# Patient Record
Sex: FEMALE | Race: BLACK OR AFRICAN AMERICAN | NOT HISPANIC OR LATINO | Employment: OTHER | URBAN - METROPOLITAN AREA
[De-identification: names, ages, dates, MRNs, and addresses within clinical notes are randomized per-mention and may not be internally consistent; named-entity substitution may affect disease eponyms.]

---

## 2017-12-12 ENCOUNTER — OFFICE VISIT (OUTPATIENT)
Dept: CARDIOTHORACIC SURGERY | Facility: CLINIC | Age: 47
End: 2017-12-12
Payer: COMMERCIAL

## 2017-12-12 VITALS
OXYGEN SATURATION: 97 % | TEMPERATURE: 99 F | BODY MASS INDEX: 29.98 KG/M2 | SYSTOLIC BLOOD PRESSURE: 142 MMHG | WEIGHT: 191 LBS | HEART RATE: 67 BPM | DIASTOLIC BLOOD PRESSURE: 104 MMHG | HEIGHT: 67 IN

## 2017-12-12 DIAGNOSIS — I34.0 MITRAL VALVE INSUFFICIENCY, UNSPECIFIED ETIOLOGY: Primary | ICD-10-CM

## 2017-12-12 DIAGNOSIS — I34.0 NON-RHEUMATIC MITRAL REGURGITATION: ICD-10-CM

## 2017-12-12 PROCEDURE — 99999 PR PBB SHADOW E&M-EST. PATIENT-LVL III: CPT | Mod: PBBFAC,,, | Performed by: THORACIC SURGERY (CARDIOTHORACIC VASCULAR SURGERY)

## 2017-12-12 PROCEDURE — 99205 OFFICE O/P NEW HI 60 MIN: CPT | Mod: S$GLB,,, | Performed by: THORACIC SURGERY (CARDIOTHORACIC VASCULAR SURGERY)

## 2017-12-12 RX ORDER — MONTELUKAST SODIUM 10 MG/1
10 TABLET ORAL NIGHTLY
COMMUNITY
Start: 2017-10-05 | End: 2024-01-17

## 2017-12-12 RX ORDER — TOPIRAMATE 50 MG/1
TABLET, FILM COATED ORAL
Status: ON HOLD | COMMUNITY
Start: 2017-11-30 | End: 2018-01-24 | Stop reason: HOSPADM

## 2017-12-12 NOTE — PROGRESS NOTES
Subjective:      Patient ID: Alondra Carrera is a 47 y.o. female.    Chief Complaint: Consult      HPI:  Alondra Carrera is a 47 y.o. female who present as a referral from Dr. Garner as a consult for surgical intervention for her severe MR with MV prolapse. Her PMHx includes severe MR with moderate mitral valve prolapse, pulmonary hypertension, white mater lesions, and anxiety. She is a current smoker. She endorses fatigue, SOB with ADLs, chest pain, palpitations, and orthopnea. She has known she had a murmur since childhood, but in the last 6 months she has noticed an increase in her symptoms.     Review of patient's allergies indicates:   Allergen Reactions    Sulfa (sulfonamide antibiotics)      Past Medical History:   Diagnosis Date    Genital herpes, unspecified     Hypertension     Mental disorder     Migraines      Past Surgical History:   Procedure Laterality Date    ECTOPIC PREGNANCY SURGERY      EXPLORATORY LAPAROTOMY W/ BOWEL RESECTION      TUBAL LIGATION       Family History     None        Social History     Social History    Marital status:      Spouse name: N/A    Number of children: N/A    Years of education: N/A     Occupational History    Not on file.     Social History Main Topics    Smoking status: Current Every Day Smoker    Smokeless tobacco: Not on file    Alcohol use Yes    Drug use: No    Sexual activity: Yes     Partners: Male     Other Topics Concern    Not on file     Social History Narrative    No narrative on file       Current medications Reviewed    Review of Systems   Constitutional: Positive for activity change and fatigue. Negative for appetite change and fever.   HENT: Negative for congestion, dental problem, sneezing and sore throat.    Eyes: Negative for pain, discharge and visual disturbance.   Respiratory: Positive for shortness of breath. Negative for cough and wheezing.    Cardiovascular: Positive for chest pain and palpitations. Negative for  leg swelling.   Gastrointestinal: Negative for abdominal distention, abdominal pain, constipation, diarrhea, nausea and vomiting.   Endocrine: Negative for polydipsia, polyphagia and polyuria.   Genitourinary: Negative for dysuria, frequency and urgency.   Musculoskeletal: Negative for back pain and gait problem.   Skin: Negative for rash and wound.   Neurological: Positive for dizziness, light-headedness and headaches. Negative for seizures and syncope.   Hematological: Does not bruise/bleed easily.   Psychiatric/Behavioral: Negative for agitation and confusion. The patient is nervous/anxious.      Objective:   Physical Exam   Constitutional: She is oriented to person, place, and time. She appears well-developed and well-nourished.   HENT:   Head: Normocephalic and atraumatic.   Eyes: Pupils are equal, round, and reactive to light.   Neck: Normal range of motion. Neck supple.   Cardiovascular: Normal rate and regular rhythm.    Murmur heard.  Pulmonary/Chest: Effort normal and breath sounds normal.   Abdominal: Soft. Bowel sounds are normal.   Musculoskeletal: Normal range of motion.   Neurological: She is alert and oriented to person, place, and time.   Skin: Skin is warm and dry.   Psychiatric: She has a normal mood and affect. Her behavior is normal.     Diagnostic Results:   2D ECHO- outside records  -EF 65%  -atrial septal aneurysm   -Moderate MVprolapse   -Mod-sev MR  -mild TR  -PAS 41  -mind-mod [place]  Assessment:   Alondra Carrera is a 47 y.o. female who present as a referral from Dr. Garner as a consult for surgical intervention for her severe MR with MV prolapse.  Plan:       CTS Attending Note:    I have personally taken the history and examined this patient and agree with the NP's note as stated above. 48 yo F with severe symptomatic MR. I recommended MVr.  We discussed the risks and benefits of the proposed procedure, including but not limited to death, stroke, respiratory complications, renal  complications, arrythmia, need for permanent pacemaker, and infection. Her questions have been answered, and she wishes to proceed. In the event that the valve is irreparable, the patient is considering valve choice after a discussion of the advantages and disadvantages of mechanical and tissue prostheses. Given smoking history, she needs LHC prior to surgery.

## 2017-12-12 NOTE — LETTER
December 12, 2017      Willis Garner MD  [address] [place] [address] [address]  [address]  [facility]  [place] [address]           [facility]  [address]  [facility] [address]  Phone: [phone]          Patient: Alondra Carrera   MR Number: [id number]   YOB: 1970   Date of Visit: 12/12/2017       Dear Dr. Willis Garner:    Thank you for referring Alondra Carrera to me for evaluation. Attached you will find relevant portions of my assessment and plan of care.    If you have questions, please do not hesitate to call me. I look forward to following Alondra Carrera along with you.    Sincerely,    Randall Sorensen MD    Enclosure  CC:  No Recipients    If you would like to receive this communication electronically, please contact [email or web address] or [phone] to request more information on [employer] access.    For providers and/or their staff who would like to refer a patient to [facility], please contact us through our one-stop-shop provider referral line, [facility], at [phone].    If you feel you have received this communication in error or would no longer like to receive these types of communications, please e-mail [email or web address]

## 2017-12-27 DIAGNOSIS — I34.0 MITRAL VALVE INSUFFICIENCY, UNSPECIFIED ETIOLOGY: Primary | ICD-10-CM

## 2018-01-16 ENCOUNTER — HOSPITAL ENCOUNTER (OUTPATIENT)
Dept: CARDIOLOGY | Facility: CLINIC | Age: 48
Discharge: HOME | DRG: 220 | End: 2018-01-16
Attending: THORACIC SURGERY (CARDIOTHORACIC VASCULAR SURGERY)
Payer: COMMERCIAL

## 2018-01-16 ENCOUNTER — HOSPITAL ENCOUNTER (OUTPATIENT)
Dept: PULMONOLOGY | Facility: CLINIC | Age: 48
Discharge: HOME | DRG: 220 | End: 2018-01-16
Payer: COMMERCIAL

## 2018-01-16 ENCOUNTER — OFFICE VISIT (OUTPATIENT)
Dept: CARDIOTHORACIC SURGERY | Facility: CLINIC | Age: 48
DRG: 220 | End: 2018-01-16
Attending: THORACIC SURGERY (CARDIOTHORACIC VASCULAR SURGERY)
Payer: COMMERCIAL

## 2018-01-16 ENCOUNTER — HOSPITAL ENCOUNTER (OUTPATIENT)
Dept: VASCULAR SURGERY | Facility: CLINIC | Age: 48
Discharge: HOME | DRG: 220 | End: 2018-01-16
Attending: THORACIC SURGERY (CARDIOTHORACIC VASCULAR SURGERY)
Payer: COMMERCIAL

## 2018-01-16 ENCOUNTER — HOSPITAL ENCOUNTER (OUTPATIENT)
Dept: RADIOLOGY | Facility: HOSPITAL | Age: 48
Discharge: HOME | DRG: 220 | End: 2018-01-16
Attending: THORACIC SURGERY (CARDIOTHORACIC VASCULAR SURGERY)
Payer: COMMERCIAL

## 2018-01-16 ENCOUNTER — ANESTHESIA EVENT (OUTPATIENT)
Dept: SURGERY | Facility: HOSPITAL | Age: 48
DRG: 220 | End: 2018-01-16
Payer: COMMERCIAL

## 2018-01-16 VITALS
WEIGHT: 190.81 LBS | BODY MASS INDEX: 29.95 KG/M2 | SYSTOLIC BLOOD PRESSURE: 141 MMHG | HEIGHT: 67 IN | DIASTOLIC BLOOD PRESSURE: 85 MMHG | HEART RATE: 74 BPM | TEMPERATURE: 99 F

## 2018-01-16 DIAGNOSIS — I34.0 MITRAL VALVE INSUFFICIENCY, UNSPECIFIED ETIOLOGY: ICD-10-CM

## 2018-01-16 DIAGNOSIS — Z01.818 PREOP EXAMINATION: ICD-10-CM

## 2018-01-16 DIAGNOSIS — I34.0 NON-RHEUMATIC MITRAL REGURGITATION: Primary | ICD-10-CM

## 2018-01-16 LAB
ESTIMATED PA SYSTOLIC PRESSURE: 31.73
MITRAL VALVE MOBILITY: ABNORMAL
MITRAL VALVE REGURGITATION: ABNORMAL
PRE FEV1 FVC: 82
PRE FEV1: 2.27
PRE FVC: 2.78
PREDICTED FEV1 FVC: 81
PREDICTED FEV1: 2.91
PREDICTED FVC: 3.57
RETIRED EF AND QEF - SEE NOTES: 65 (ref 55–65)
TRICUSPID VALVE REGURGITATION: ABNORMAL

## 2018-01-16 PROCEDURE — 93880 EXTRACRANIAL BILAT STUDY: CPT | Mod: S$GLB,,, | Performed by: SURGERY

## 2018-01-16 PROCEDURE — 94010 BREATHING CAPACITY TEST: CPT | Mod: S$GLB,,, | Performed by: INTERNAL MEDICINE

## 2018-01-16 PROCEDURE — 71046 X-RAY EXAM CHEST 2 VIEWS: CPT | Mod: TC,FY

## 2018-01-16 PROCEDURE — 93306 TTE W/DOPPLER COMPLETE: CPT | Mod: S$GLB,,, | Performed by: INTERNAL MEDICINE

## 2018-01-16 PROCEDURE — 99999 PR PBB SHADOW E&M-EST. PATIENT-LVL III: CPT | Mod: PBBFAC,,, | Performed by: THORACIC SURGERY (CARDIOTHORACIC VASCULAR SURGERY)

## 2018-01-16 PROCEDURE — 36600 WITHDRAWAL OF ARTERIAL BLOOD: CPT | Mod: 53,S$GLB,, | Performed by: INTERNAL MEDICINE

## 2018-01-16 PROCEDURE — 71046 X-RAY EXAM CHEST 2 VIEWS: CPT | Mod: 26,,, | Performed by: RADIOLOGY

## 2018-01-16 PROCEDURE — 99499 UNLISTED E&M SERVICE: CPT | Mod: S$GLB,,, | Performed by: NURSE PRACTITIONER

## 2018-01-16 PROCEDURE — 93000 ELECTROCARDIOGRAM COMPLETE: CPT | Mod: S$GLB,,, | Performed by: INTERNAL MEDICINE

## 2018-01-16 NOTE — PROGRESS NOTES
"PREPARING FOR SURGERY  Your surgery has been scheduled for:   Day: Thursday Date: 1/18/2018  Arrival Time: 5A  Start Time: 7A  You should report to the second floor surgery center, located on the [facility] side of the second floor of the [facility]. The phone number is [phone].    PLEASE NOTE  · If you are allergic to any medications, please inform your doctor or the nurse responsible for your care.  · Tell the doctor if you take aspirin, products containing aspirin, herbal medications or blood thinners, such as Coumadin, Pradaxa, or Plavix.  · Notify your doctor if you are diabetic and provide information about the medications you take.  · Arrange for someone to drive you home following surgery. You will not be allowed to leave the surgical facility alone or drive yourself home following sedation and anesthesia.  · If you have not already done so, please bring a list of your medications with you the day of your surgery.    BEFORE SURGERY  Stop taking all herbal medications 14 days prior to surgery  Stop taking aspirin, products containing aspirin 0 days before surgery  Stop taking blood thinners 0 days before surgery  Refrain from drinking alcohol beverages for 24 hours before and after surgery  Stop or limit smoking 0 days before surgery  Other: ________________________________________________________    THE NIGHT BEFORE SURGERY  Eat a light supper on the night before your surgery, no greasy or fatty foods.  DO NOT EAT OR DRINK ANYTHING AFTER MIDNIGHT, INCLUDING GUM, HARD CANDY, MINTS, OR CHEWING TOBACCO  Take a complete shower. Wash your body from the neck down with Hibiclens (chlorhexidine gluconate) soap. Hibiclens soap may be purchased over the counter at the pharmacy. Keep the soap away from your eyes, ears, and mouth. After washing with Hibiclens, rinse thoroughly. You may also use any soap labeled "antibacterial". Shampoo your hair with your regular shampoo.    THE DAY OF SURGERY  Take " another shower with Hibiclens or any antibacterial soap, to reduce the change of infection.  Medications to take the morning of surgery: Metoprolol with a small sip of water. Do not take diuretics or fluid pills.  Diabetic medication instructions will be given by the preop center. They will call you before your surgery.  You may brush your teeth and rinse your mouth, but do not shallow any water.  Do not apply perfume, powder, body lotions or deodorant on the day of surgery.  Do not wear any makeup, including mascara and false eyelashes.  Nail polish should be removed.  Wear comfortable clothes, such as button front shirt and loose-fitting pants.  Leave all jewelry, including body piercings and valuables at home.  Hairpins and clasps must be removed before you enter the operating room.  You may wear glasses, dentures and hearing aids before and after surgery. They may need to be removed before going into the operating room. Contact lenses worn before surgery must be removed before entering the operating room. Please bring a case for your hearing aids, glasses and/or contacts.  Bring any devices you will need after surgery such as crutches or canes.  If you have sleep apnea, please bring your CPAP machine.  If you have an implantable device, such as a pacemaker or AICD, please bring the device information card, if you have one.    If you have any questions or concerns, please don't hesitate to call.    RUTH PizarroN, RN  RN Clinician  Thoracic and Cardiovascular Surgery  [address] [place] [id number]  [phone] fax

## 2018-01-16 NOTE — ANESTHESIA PREPROCEDURE EVALUATION
[facility]  Anesthesia Pre-Operative Evaluation         Patient Name: Alondra Carrera  YOB: 1970  MRN: [id number]    SUBJECTIVE:     Pre-operative evaluation for Procedure(s) (LRB):  Mitral Valve Repair (N/A)     01/16/2018    Alondra Carrera is a 47 y.o. female w/ a significant PMHx of current smoker, HTN and severe MR with MV prolapse and PA SP of 32.     Patient now presents for the above procedure(s).       Prev airway: None documented.    Patient Active Problem List   Diagnosis    Mitral valve insufficiency    Preop examination       Review of patient's allergies indicates:   Allergen Reactions    Sulfa (sulfonamide antibiotics)        Current Inpatient Medications:      Current Outpatient Prescriptions on File Prior to Encounter   Medication Sig Dispense Refill    butalbital-acetaminophen-caffeine -40 mg (FIORICET, ESGIC) -40 mg per tablet       clonazepam (KLONOPIN) 0.5 MG tablet       duloxetine (CYMBALTA) 60 MG capsule Take 60 mg by mouth once daily.  0    losartan (COZAAR) 50 MG tablet Take 50 mg by mouth once daily.  3    metoprolol succinate (TOPROL-XL) 50 MG 24 hr tablet Take 50 mg by mouth once daily.  3    montelukast (SINGULAIR) 10 mg tablet       MONTELUKAST SODIUM (SINGULAIR ORAL) Take by mouth.      simvastatin (ZOCOR) 20 MG tablet Take 20 mg by mouth nightly.  0    SPIRIVA WITH HANDIHALER 18 mcg inhalation capsule INHALE CONTENTS OF 1 CAPSULE ONCE DAILY.  3    topiramate (TOPAMAX) 25 MG tablet       topiramate (TOPAMAX) 50 MG tablet       valacyclovir (VALTREX) 500 MG tablet Take 1 tablet (500 mg total) by mouth 2 (two) times daily. 10 tablet 11     No current facility-administered medications on file prior to encounter.        Past Surgical History:   Procedure Laterality Date    ECTOPIC PREGNANCY SURGERY      EXPLORATORY LAPAROTOMY W/ BOWEL RESECTION      TUBAL LIGATION         Social History     Social History    Marital status:       Spouse name: N/A    Number of children: N/A    Years of education: N/A     Occupational History    Not on file.     Social History Main Topics    Smoking status: Current Every Day Smoker    Smokeless tobacco: Not on file    Alcohol use Yes    Drug use: No    Sexual activity: Yes     Partners: Male     Other Topics Concern    Not on file     Social History Narrative    No narrative on file       OBJECTIVE:     Vital Signs Range (Last 24H):  Temp:  [37.3 °C (99.1 °F)]   Pulse:  [74]   BP: (141)/(85)       CBC:   Recent Labs      18   1120   WBC  4.36   RBC  4.97   HGB  12.9   HCT  41.1   PLT  183   MCV  83   MCH  26.0*   MCHC  31.4*       CMP:   Recent Labs      18   1120   NA  142   K  3.4*   CL  110   CO2  25   BUN  8   CREATININE  0.9   GLU  83   CALCIUM  9.4   ALBUMIN  3.5   PROT  6.9   ALKPHOS  80   ALT  9*   AST  18   BILITOT  0.7       INR:  Recent Labs      18   1120   INR  1.0   APTT  26.2       Diagnostic Studies: CXR 2018  Cardiomegaly, no acute lung pathologies    EK2018  Normal sinus rhythm  Possible Left atrial enlargement  Borderline Abnormal ECG  When compared with ECG of 09-AUG-2015 09:48,    2D ECHO:  Results for orders placed or performed during the hospital encounter of 18   2D echo with color flow doppler   Result Value Ref Range    EF 65 55 - 65    Mitral Valve Regurgitation MODERATE TO SEVERE (A)     Est. PA Systolic Pressure 31.73     Mitral Valve Mobility POSTERIOR LEAFLET PROLAPSE     Tricuspid Valve Regurgitation MILD      CONCLUSIONS     1 - Normal left ventricular systolic function (EF 60-65%).     2 - Mild left atrial enlargement.     3 - No wall motion abnormalities.     4 - Normal right ventricular systolic function .     5 - The estimated PA systolic pressure is 32 mmHg.     6 - The mitral valve is thickened with evidence of posterior leaflet prolapse.     7 - Moderate to severe mitral regurgitation ( see text).     8 - Mild  tricuspid regurgitation.     9 - Low central venous pressure.     ASSESSMENT/PLAN:         Anesthesia Evaluation    I have reviewed the Patient Summary Reports.     I have reviewed the Medications.     Review of Systems  Anesthesia Hx:  No problems with previous Anesthesia Denies Hx of Anesthetic complications  History of prior surgery of interest to airway management or planning:   Social:  Smoker    Cardiovascular:   Exercise tolerance: poor Hypertension Valvular problems/Murmurs, MR, MVP ECG has been reviewed.    Pulmonary:   Shortness of breath    Neurological:   Headaches    Endocrine:  Endocrine Normal    Psych:   anxiety          Physical Exam  General:  Well nourished    Airway/Jaw/Neck:  Airway Findings: Mouth Opening: Small, but > 3cm Tongue: Normal  General Airway Assessment: Adult  Mallampati: III  Improves to II with phonation.  TM Distance: Normal, at least 6 cm        Eyes/Ears/Nose:  EYES/EARS/NOSE FINDINGS: Normal   Dental:  Dental Findings: In tact   Chest/Lungs:  Chest/Lungs Findings: Normal Respiratory Rate     Heart/Vascular:  Heart Findings: Rate: Normal  Rhythm: Regular Rhythm  Sounds: Normal  Heart Murmur        Mental Status:  Mental Status Findings:  Cooperative, Alert and Oriented         Anesthesia Plan  Type of Anesthesia, risks & benefits discussed:  Anesthesia Type:  general  Patient's Preference:   Intra-op Monitoring Plan: standard ASA monitors, arterial line, central line and Hollister-Yves  Intra-op Monitoring Plan Comments:   Post Op Pain Control Plan: multimodal analgesia, per primary service following discharge from PACU and IV/PO Opioids PRN  Post Op Pain Control Plan Comments:   Induction:   IV  Beta Blocker:  Patient is not currently on a Beta-Blocker (No further documentation required).       Informed Consent: Patient understands risks and agrees with Anesthesia plan.  Questions answered. Anesthesia consent signed with patient.  ASA Score: 3     Day of Surgery Review of History &  Physical:    H&P update referred to the surgeon.     Anesthesia Plan Notes: Patient did not show to pre-op clinic        Ready For Surgery From Anesthesia Perspective.

## 2018-01-16 NOTE — PROGRESS NOTES
[]Onelia for attribution information  Subjective:      Patient ID: Alondra Carrera is a 47 y.o. female.     Chief Complaint: Consult        HPI:  Alondra Carrera is a 47 y.o. female who present as a referral from Dr. Garner as a consult for surgical intervention for her severe MR with MV prolapse. Her PMHx includes severe MR with moderate mitral valve prolapse, pulmonary hypertension, white mater lesions, and anxiety. She is a current smoker. She endorses fatigue, SOB with ADLs, chest pain, palpitations, and orthopnea. She has known she had a murmur since childhood, but in the last 6 months she has noticed an increase in her symptoms.           Review of patient's allergies indicates:   Allergen Reactions    Sulfa (sulfonamide antibiotics)             Past Medical History:   Diagnosis Date    Genital herpes, unspecified      Hypertension      Mental disorder      Migraines              Past Surgical History:   Procedure Laterality Date    ECTOPIC PREGNANCY SURGERY        EXPLORATORY LAPAROTOMY W/ BOWEL RESECTION        TUBAL LIGATION              Family History      None          Social History   Social History            Social History    Marital status:        Spouse name: N/A    Number of children: N/A    Years of education: N/A          Occupational History    Not on file.            Social History Main Topics    Smoking status: Current Every Day Smoker    Smokeless tobacco: Not on file    Alcohol use Yes    Drug use: No    Sexual activity: Yes       Partners: Male           Other Topics Concern    Not on file          Social History Narrative    No narrative on file            Current medications Reviewed     Review of Systems   Constitutional: Positive for activity change and fatigue. Negative for appetite change and fever.   HENT: Negative for congestion, dental problem, sneezing and sore throat.    Eyes: Negative for pain, discharge and visual disturbance.   Respiratory: Positive  for shortness of breath. Negative for cough and wheezing.    Cardiovascular: Positive for chest pain and palpitations. Negative for leg swelling.   Gastrointestinal: Negative for abdominal distention, abdominal pain, constipation, diarrhea, nausea and vomiting.   Endocrine: Negative for polydipsia, polyphagia and polyuria.   Genitourinary: Negative for dysuria, frequency and urgency.   Musculoskeletal: Negative for back pain and gait problem.   Skin: Negative for rash and wound.   Neurological: Positive for dizziness, light-headedness and headaches. Negative for seizures and syncope.   Hematological: Does not bruise/bleed easily.   Psychiatric/Behavioral: Negative for agitation and confusion. The patient is nervous/anxious.       Objective:   Physical Exam   Constitutional: She is oriented to person, place, and time. She appears well-developed and well-nourished.   HENT:   Head: Normocephalic and atraumatic.   Eyes: Pupils are equal, round, and reactive to light.   Neck: Normal range of motion. Neck supple.   Cardiovascular: Normal rate and regular rhythm.    Murmur heard.  Pulmonary/Chest: Effort normal and breath sounds normal.   Abdominal: Soft. Bowel sounds are normal.   Musculoskeletal: Normal range of motion.   Neurological: She is alert and oriented to person, place, and time.   Skin: Skin is warm and dry.   Psychiatric: She has a normal mood and affect. Her behavior is normal.      Diagnostic Results:   2D ECHO- outside records  -EF 65%  -atrial septal aneurysm   -Moderate MVprolapse   -Mod-sev MR  -mild TR  -PAS 41  -mind-mod [place]  Assessment:   Alondra Carrera is a 47 y.o. female who present as a referral from Dr. Garner as a consult for surgical intervention for her severe MR with MV prolapse.  Plan:    Plans for MVr this week. Pt will bring in disk from [facility].     CTS Attending Note:    I have personally taken the history and examined this patient and agree with the NP's note as stated above.  Plan MVr. Questions answered. She wishes to proceed.

## 2018-01-18 ENCOUNTER — HOSPITAL ENCOUNTER (INPATIENT)
Facility: HOSPITAL | Age: 48
LOS: 6 days | Discharge: HOME HEALTH | DRG: 220 | End: 2018-01-24
Attending: THORACIC SURGERY (CARDIOTHORACIC VASCULAR SURGERY) | Admitting: THORACIC SURGERY (CARDIOTHORACIC VASCULAR SURGERY)
Payer: COMMERCIAL

## 2018-01-18 ENCOUNTER — SURGERY (OUTPATIENT)
Age: 48
End: 2018-01-18

## 2018-01-18 ENCOUNTER — ANESTHESIA (OUTPATIENT)
Dept: SURGERY | Facility: HOSPITAL | Age: 48
DRG: 220 | End: 2018-01-18
Payer: COMMERCIAL

## 2018-01-18 DIAGNOSIS — I34.0 NON-RHEUMATIC MITRAL REGURGITATION: Primary | ICD-10-CM

## 2018-01-18 DIAGNOSIS — I34.0 MITRAL VALVE INSUFFICIENCY, UNSPECIFIED ETIOLOGY: ICD-10-CM

## 2018-01-18 DIAGNOSIS — Z98.890 HISTORY OF HEART SURGERY: ICD-10-CM

## 2018-01-18 DIAGNOSIS — I34.9 NONRHEUMATIC MITRAL VALVE DISORDER: ICD-10-CM

## 2018-01-18 DIAGNOSIS — I49.9 ARRHYTHMIA: ICD-10-CM

## 2018-01-18 DIAGNOSIS — R00.0 SINUS TACHYCARDIA: ICD-10-CM

## 2018-01-18 DIAGNOSIS — R73.9 STRESS HYPERGLYCEMIA: ICD-10-CM

## 2018-01-18 DIAGNOSIS — Z98.890 S/P MVR (MITRAL VALVE REPAIR): ICD-10-CM

## 2018-01-18 LAB
ALLENS TEST: ABNORMAL
ALLENS TEST: ABNORMAL
ANION GAP SERPL CALC-SCNC: ABNORMAL MMOL/L
APTT BLDCRRT: 29.8 SEC
BASOPHILS # BLD AUTO: 0.06 K/UL
BASOPHILS NFR BLD: 0.4 %
BUN SERPL-MCNC: 7 MG/DL
CALCIUM SERPL-MCNC: 8 MG/DL
CHLORIDE SERPL-SCNC: 117 MMOL/L
CO2 SERPL-SCNC: ABNORMAL MMOL/L
CREAT SERPL-MCNC: 0.8 MG/DL
DELSYS: ABNORMAL
DELSYS: ABNORMAL
DIFFERENTIAL METHOD: ABNORMAL
EOSINOPHIL # BLD AUTO: 0 K/UL
EOSINOPHIL NFR BLD: 0.1 %
ERYTHROCYTE [DISTWIDTH] IN BLOOD BY AUTOMATED COUNT: 13.6 %
EST. GFR  (AFRICAN AMERICAN): >60 ML/MIN/1.73 M^2
EST. GFR  (NON AFRICAN AMERICAN): >60 ML/MIN/1.73 M^2
FIO2: 60
FIO2: 65
FIO2: 65
FIO2: 70
FIO2: 70
GLUCOSE SERPL-MCNC: 105 MG/DL (ref 70–110)
GLUCOSE SERPL-MCNC: 108 MG/DL (ref 70–110)
GLUCOSE SERPL-MCNC: 116 MG/DL (ref 70–110)
GLUCOSE SERPL-MCNC: 126 MG/DL (ref 70–110)
GLUCOSE SERPL-MCNC: 134 MG/DL (ref 70–110)
GLUCOSE SERPL-MCNC: 138 MG/DL (ref 70–110)
GLUCOSE SERPL-MCNC: 145 MG/DL (ref 70–110)
GLUCOSE SERPL-MCNC: 148 MG/DL (ref 70–110)
GLUCOSE SERPL-MCNC: 149 MG/DL (ref 70–110)
GLUCOSE SERPL-MCNC: 151 MG/DL (ref 70–110)
GLUCOSE SERPL-MCNC: 170 MG/DL
HCO3 UR-SCNC: 19.7 MMOL/L (ref 24–28)
HCO3 UR-SCNC: 20.8 MMOL/L (ref 24–28)
HCO3 UR-SCNC: 20.9 MMOL/L (ref 24–28)
HCO3 UR-SCNC: 23.1 MMOL/L (ref 24–28)
HCO3 UR-SCNC: 23.3 MMOL/L (ref 24–28)
HCO3 UR-SCNC: 23.4 MMOL/L (ref 24–28)
HCO3 UR-SCNC: 24.3 MMOL/L (ref 24–28)
HCO3 UR-SCNC: 24.4 MMOL/L (ref 24–28)
HCO3 UR-SCNC: 24.9 MMOL/L (ref 24–28)
HCO3 UR-SCNC: 26.1 MMOL/L (ref 24–28)
HCO3 UR-SCNC: 30.2 MMOL/L (ref 24–28)
HCO3 UR-SCNC: 30.5 MMOL/L (ref 24–28)
HCT VFR BLD AUTO: 32.2 %
HCT VFR BLD CALC: 19 %PCV (ref 36–54)
HCT VFR BLD CALC: 24 %PCV (ref 36–54)
HCT VFR BLD CALC: 25 %PCV (ref 36–54)
HCT VFR BLD CALC: 26 %PCV (ref 36–54)
HCT VFR BLD CALC: 27 %PCV (ref 36–54)
HCT VFR BLD CALC: 28 %PCV (ref 36–54)
HCT VFR BLD CALC: 28 %PCV (ref 36–54)
HCT VFR BLD CALC: 29 %PCV (ref 36–54)
HCT VFR BLD CALC: 29 %PCV (ref 36–54)
HCT VFR BLD CALC: 31 %PCV (ref 36–54)
HCT VFR BLD CALC: 31 %PCV (ref 36–54)
HGB BLD-MCNC: 10.3 G/DL
IMM GRANULOCYTES # BLD AUTO: 0.12 K/UL
IMM GRANULOCYTES NFR BLD AUTO: 0.7 %
INR PPP: 1.2
LYMPHOCYTES # BLD AUTO: 1.4 K/UL
LYMPHOCYTES NFR BLD: 8.1 %
MAGNESIUM SERPL-MCNC: 2.6 MG/DL
MCH RBC QN AUTO: 27.1 PG
MCHC RBC AUTO-ENTMCNC: 32 G/DL
MCV RBC AUTO: 85 FL
MONOCYTES # BLD AUTO: 1 K/UL
MONOCYTES NFR BLD: 5.9 %
NEUTROPHILS # BLD AUTO: 14.4 K/UL
NEUTROPHILS NFR BLD: 84.8 %
NRBC BLD-RTO: 0 /100 WBC
PCO2 BLDA: 33.1 MMHG (ref 35–45)
PCO2 BLDA: 36.7 MMHG (ref 35–45)
PCO2 BLDA: 37.2 MMHG (ref 35–45)
PCO2 BLDA: 40.4 MMHG (ref 35–45)
PCO2 BLDA: 40.8 MMHG (ref 35–45)
PCO2 BLDA: 41 MMHG (ref 35–45)
PCO2 BLDA: 41.2 MMHG (ref 35–45)
PCO2 BLDA: 41.5 MMHG (ref 35–45)
PCO2 BLDA: 41.6 MMHG (ref 35–45)
PCO2 BLDA: 44.1 MMHG (ref 35–45)
PCO2 BLDA: 44.2 MMHG (ref 35–45)
PCO2 BLDA: 48.3 MMHG (ref 35–45)
PH SMN: 7.25 [PH] (ref 7.35–7.45)
PH SMN: 7.26 [PH] (ref 7.35–7.45)
PH SMN: 7.35 [PH] (ref 7.35–7.45)
PH SMN: 7.36 [PH] (ref 7.35–7.45)
PH SMN: 7.38 [PH] (ref 7.35–7.45)
PH SMN: 7.39 [PH] (ref 7.35–7.45)
PH SMN: 7.4 [PH] (ref 7.35–7.45)
PH SMN: 7.41 [PH] (ref 7.35–7.45)
PH SMN: 7.44 [PH] (ref 7.35–7.45)
PH SMN: 7.53 [PH] (ref 7.35–7.45)
PHOSPHATE SERPL-MCNC: 2.6 MG/DL
PLATELET # BLD AUTO: 98 K/UL
PMV BLD AUTO: 10.9 FL
PO2 BLDA: 101 MMHG (ref 80–100)
PO2 BLDA: 254 MMHG (ref 80–100)
PO2 BLDA: 294 MMHG (ref 80–100)
PO2 BLDA: 324 MMHG (ref 80–100)
PO2 BLDA: 359 MMHG (ref 80–100)
PO2 BLDA: 359 MMHG (ref 80–100)
PO2 BLDA: 361 MMHG (ref 80–100)
PO2 BLDA: 371 MMHG (ref 80–100)
PO2 BLDA: 45 MMHG (ref 40–60)
PO2 BLDA: 463 MMHG (ref 80–100)
PO2 BLDA: 490 MMHG (ref 80–100)
PO2 BLDA: 497 MMHG (ref 80–100)
POC BE: -1 MMOL/L
POC BE: -2 MMOL/L
POC BE: -2 MMOL/L
POC BE: -4 MMOL/L
POC BE: -6 MMOL/L
POC BE: -7 MMOL/L
POC BE: 0 MMOL/L
POC BE: 2 MMOL/L
POC BE: 6 MMOL/L
POC BE: 8 MMOL/L
POC IONIZED CALCIUM: 0.91 MMOL/L (ref 1.06–1.42)
POC IONIZED CALCIUM: 1.02 MMOL/L (ref 1.06–1.42)
POC IONIZED CALCIUM: 1.03 MMOL/L (ref 1.06–1.42)
POC IONIZED CALCIUM: 1.09 MMOL/L (ref 1.06–1.42)
POC IONIZED CALCIUM: 1.1 MMOL/L (ref 1.06–1.42)
POC IONIZED CALCIUM: 1.1 MMOL/L (ref 1.06–1.42)
POC IONIZED CALCIUM: 1.11 MMOL/L (ref 1.06–1.42)
POC IONIZED CALCIUM: 1.13 MMOL/L (ref 1.06–1.42)
POC IONIZED CALCIUM: 1.32 MMOL/L (ref 1.06–1.42)
POC SATURATED O2: 100 % (ref 95–100)
POC SATURATED O2: 80 % (ref 95–100)
POC SATURATED O2: 97 % (ref 95–100)
POC TCO2: 21 MMOL/L (ref 23–27)
POC TCO2: 22 MMOL/L (ref 23–27)
POC TCO2: 22 MMOL/L (ref 23–27)
POC TCO2: 24 MMOL/L (ref 23–27)
POC TCO2: 25 MMOL/L (ref 23–27)
POC TCO2: 25 MMOL/L (ref 23–27)
POC TCO2: 26 MMOL/L (ref 23–27)
POC TCO2: 26 MMOL/L (ref 23–27)
POC TCO2: 26 MMOL/L (ref 24–29)
POC TCO2: 27 MMOL/L (ref 23–27)
POC TCO2: 32 MMOL/L (ref 23–27)
POC TCO2: 32 MMOL/L (ref 23–27)
POTASSIUM BLD-SCNC: 2.8 MMOL/L (ref 3.5–5.1)
POTASSIUM BLD-SCNC: 3.5 MMOL/L (ref 3.5–5.1)
POTASSIUM BLD-SCNC: 3.7 MMOL/L (ref 3.5–5.1)
POTASSIUM BLD-SCNC: 3.9 MMOL/L (ref 3.5–5.1)
POTASSIUM BLD-SCNC: 4.2 MMOL/L (ref 3.5–5.1)
POTASSIUM BLD-SCNC: 4.3 MMOL/L (ref 3.5–5.1)
POTASSIUM BLD-SCNC: 4.5 MMOL/L (ref 3.5–5.1)
POTASSIUM BLD-SCNC: 4.6 MMOL/L (ref 3.5–5.1)
POTASSIUM BLD-SCNC: 4.6 MMOL/L (ref 3.5–5.1)
POTASSIUM SERPL-SCNC: 4 MMOL/L
POTASSIUM SERPL-SCNC: 4.3 MMOL/L
PROTHROMBIN TIME: 12.5 SEC
RBC # BLD AUTO: 3.8 M/UL
SAMPLE: ABNORMAL
SITE: ABNORMAL
SITE: ABNORMAL
SODIUM BLD-SCNC: 145 MMOL/L (ref 136–145)
SODIUM BLD-SCNC: 146 MMOL/L (ref 136–145)
SODIUM BLD-SCNC: 147 MMOL/L (ref 136–145)
SODIUM BLD-SCNC: 148 MMOL/L (ref 136–145)
SODIUM SERPL-SCNC: 142 MMOL/L
WBC # BLD AUTO: 16.98 K/UL

## 2018-01-18 PROCEDURE — 93010 ELECTROCARDIOGRAM REPORT: CPT | Mod: ,,, | Performed by: INTERNAL MEDICINE

## 2018-01-18 PROCEDURE — 27000175 HC ADULT BYPASS PUMP

## 2018-01-18 PROCEDURE — 02BG0ZZ EXCISION OF MITRAL VALVE, OPEN APPROACH: ICD-10-PCS | Performed by: THORACIC SURGERY (CARDIOTHORACIC VASCULAR SURGERY)

## 2018-01-18 PROCEDURE — 27201423 OPTIME MED/SURG SUP & DEVICES STERILE SUPPLY: Performed by: THORACIC SURGERY (CARDIOTHORACIC VASCULAR SURGERY)

## 2018-01-18 PROCEDURE — 99900035 HC TECH TIME PER 15 MIN (STAT)

## 2018-01-18 PROCEDURE — 27100088 HC CELL SAVER

## 2018-01-18 PROCEDURE — 20000000 HC ICU ROOM

## 2018-01-18 PROCEDURE — 88305 TISSUE EXAM BY PATHOLOGIST: CPT | Mod: 26,,, | Performed by: PATHOLOGY

## 2018-01-18 PROCEDURE — 76942 ECHO GUIDE FOR BIOPSY: CPT | Mod: 26,,, | Performed by: ANESTHESIOLOGY

## 2018-01-18 PROCEDURE — 33427 REPAIR OF MITRAL VALVE: CPT | Mod: ,,, | Performed by: THORACIC SURGERY (CARDIOTHORACIC VASCULAR SURGERY)

## 2018-01-18 PROCEDURE — 82803 BLOOD GASES ANY COMBINATION: CPT

## 2018-01-18 PROCEDURE — 83735 ASSAY OF MAGNESIUM: CPT

## 2018-01-18 PROCEDURE — 36556 INSERT NON-TUNNEL CV CATH: CPT | Mod: 59,,, | Performed by: ANESTHESIOLOGY

## 2018-01-18 PROCEDURE — 27100025 HC TUBING, SET FLUID WARMER: Performed by: ANESTHESIOLOGY

## 2018-01-18 PROCEDURE — 37000009 HC ANESTHESIA EA ADD 15 MINS: Performed by: THORACIC SURGERY (CARDIOTHORACIC VASCULAR SURGERY)

## 2018-01-18 PROCEDURE — 63600175 PHARM REV CODE 636 W HCPCS: Performed by: STUDENT IN AN ORGANIZED HEALTH CARE EDUCATION/TRAINING PROGRAM

## 2018-01-18 PROCEDURE — 25000003 PHARM REV CODE 250: Performed by: STUDENT IN AN ORGANIZED HEALTH CARE EDUCATION/TRAINING PROGRAM

## 2018-01-18 PROCEDURE — 84132 ASSAY OF SERUM POTASSIUM: CPT

## 2018-01-18 PROCEDURE — 80048 BASIC METABOLIC PNL TOTAL CA: CPT

## 2018-01-18 PROCEDURE — 27201041 HC RESERVOIR, CARDIOTOMY

## 2018-01-18 PROCEDURE — 93312 ECHO TRANSESOPHAGEAL: CPT | Mod: 26,59,, | Performed by: ANESTHESIOLOGY

## 2018-01-18 PROCEDURE — C9113 INJ PANTOPRAZOLE SODIUM, VIA: HCPCS | Performed by: NURSE PRACTITIONER

## 2018-01-18 PROCEDURE — 37000008 HC ANESTHESIA 1ST 15 MINUTES: Performed by: THORACIC SURGERY (CARDIOTHORACIC VASCULAR SURGERY)

## 2018-01-18 PROCEDURE — 27200953 HC CARDIOPLEGIA SYSTEM

## 2018-01-18 PROCEDURE — 84100 ASSAY OF PHOSPHORUS: CPT

## 2018-01-18 PROCEDURE — 37799 UNLISTED PX VASCULAR SURGERY: CPT

## 2018-01-18 PROCEDURE — 63600175 PHARM REV CODE 636 W HCPCS: Performed by: ANESTHESIOLOGY

## 2018-01-18 PROCEDURE — 85520 HEPARIN ASSAY: CPT

## 2018-01-18 PROCEDURE — 82330 ASSAY OF CALCIUM: CPT

## 2018-01-18 PROCEDURE — 36000713 HC OR TIME LEV V EA ADD 15 MIN: Performed by: THORACIC SURGERY (CARDIOTHORACIC VASCULAR SURGERY)

## 2018-01-18 PROCEDURE — 25000003 PHARM REV CODE 250: Performed by: NURSE PRACTITIONER

## 2018-01-18 PROCEDURE — 63600175 PHARM REV CODE 636 W HCPCS: Mod: JG | Performed by: THORACIC SURGERY (CARDIOTHORACIC VASCULAR SURGERY)

## 2018-01-18 PROCEDURE — 63600175 PHARM REV CODE 636 W HCPCS: Performed by: NURSE PRACTITIONER

## 2018-01-18 PROCEDURE — 25000003 PHARM REV CODE 250: Performed by: ANESTHESIOLOGY

## 2018-01-18 PROCEDURE — 27200678 HC TRANSDUCER MONITOR KIT TRIPLE: Performed by: ANESTHESIOLOGY

## 2018-01-18 PROCEDURE — 36620 INSERTION CATHETER ARTERY: CPT | Mod: 59,,, | Performed by: ANESTHESIOLOGY

## 2018-01-18 PROCEDURE — 25000003 PHARM REV CODE 250: Performed by: THORACIC SURGERY (CARDIOTHORACIC VASCULAR SURGERY)

## 2018-01-18 PROCEDURE — 5A1221Z PERFORMANCE OF CARDIAC OUTPUT, CONTINUOUS: ICD-10-PCS | Performed by: THORACIC SURGERY (CARDIOTHORACIC VASCULAR SURGERY)

## 2018-01-18 PROCEDURE — 85610 PROTHROMBIN TIME: CPT

## 2018-01-18 PROCEDURE — A4216 STERILE WATER/SALINE, 10 ML: HCPCS | Performed by: NURSE PRACTITIONER

## 2018-01-18 PROCEDURE — C1729 CATH, DRAINAGE: HCPCS | Performed by: THORACIC SURGERY (CARDIOTHORACIC VASCULAR SURGERY)

## 2018-01-18 PROCEDURE — 36592 COLLECT BLOOD FROM PICC: CPT

## 2018-01-18 PROCEDURE — 27000191 HC C-V MONITORING

## 2018-01-18 PROCEDURE — 85730 THROMBOPLASTIN TIME PARTIAL: CPT

## 2018-01-18 PROCEDURE — 27200651 HC AIRWAY, LMA: Performed by: ANESTHESIOLOGY

## 2018-01-18 PROCEDURE — 64450 NJX AA&/STRD OTHER PN/BRANCH: CPT | Mod: 50,59,, | Performed by: ANESTHESIOLOGY

## 2018-01-18 PROCEDURE — 88305 TISSUE EXAM BY PATHOLOGIST: CPT | Performed by: PATHOLOGY

## 2018-01-18 PROCEDURE — 76937 US GUIDE VASCULAR ACCESS: CPT | Mod: 26,59,, | Performed by: ANESTHESIOLOGY

## 2018-01-18 PROCEDURE — 94640 AIRWAY INHALATION TREATMENT: CPT

## 2018-01-18 PROCEDURE — 27201037 HC PRESSURE MONITORING SET UP

## 2018-01-18 PROCEDURE — P9045 ALBUMIN (HUMAN), 5%, 250 ML: HCPCS | Mod: JG | Performed by: NURSE PRACTITIONER

## 2018-01-18 PROCEDURE — D9220A PRA ANESTHESIA: Mod: ,,, | Performed by: ANESTHESIOLOGY

## 2018-01-18 PROCEDURE — 25000242 PHARM REV CODE 250 ALT 637 W/ HCPCS: Performed by: NURSE PRACTITIONER

## 2018-01-18 PROCEDURE — 02UG0JZ SUPPLEMENT MITRAL VALVE WITH SYNTHETIC SUBSTITUTE, OPEN APPROACH: ICD-10-PCS | Performed by: THORACIC SURGERY (CARDIOTHORACIC VASCULAR SURGERY)

## 2018-01-18 PROCEDURE — P9045 ALBUMIN (HUMAN), 5%, 250 ML: HCPCS | Mod: JG | Performed by: THORACIC SURGERY (CARDIOTHORACIC VASCULAR SURGERY)

## 2018-01-18 PROCEDURE — C1894 INTRO/SHEATH, NON-LASER: HCPCS | Performed by: ANESTHESIOLOGY

## 2018-01-18 PROCEDURE — 36000712 HC OR TIME LEV V 1ST 15 MIN: Performed by: THORACIC SURGERY (CARDIOTHORACIC VASCULAR SURGERY)

## 2018-01-18 PROCEDURE — 84295 ASSAY OF SERUM SODIUM: CPT

## 2018-01-18 PROCEDURE — 85025 COMPLETE CBC W/AUTO DIFF WBC: CPT

## 2018-01-18 PROCEDURE — 27200750 HC INSULATED NEEDLE/ STIMUPLEX: Performed by: ANESTHESIOLOGY

## 2018-01-18 PROCEDURE — 27800595 HC HEART VALVES

## 2018-01-18 PROCEDURE — 85014 HEMATOCRIT: CPT

## 2018-01-18 PROCEDURE — 27800505 HC CATH, RADIAL ARTERY KIT: Performed by: ANESTHESIOLOGY

## 2018-01-18 RX ORDER — SODIUM CHLORIDE 9 MG/ML
INJECTION, SOLUTION INTRAVENOUS CONTINUOUS PRN
Status: DISCONTINUED | OUTPATIENT
Start: 2018-01-18 | End: 2018-01-18

## 2018-01-18 RX ORDER — HYDRALAZINE HYDROCHLORIDE 20 MG/ML
10 INJECTION INTRAMUSCULAR; INTRAVENOUS EVERY 6 HOURS PRN
Status: DISCONTINUED | OUTPATIENT
Start: 2018-01-18 | End: 2018-01-19

## 2018-01-18 RX ORDER — METOPROLOL TARTRATE 25 MG/1
25 TABLET, FILM COATED ORAL
Status: DISCONTINUED | OUTPATIENT
Start: 2018-01-18 | End: 2018-01-18 | Stop reason: HOSPADM

## 2018-01-18 RX ORDER — ACETAMINOPHEN 325 MG/1
650 TABLET ORAL EVERY 4 HOURS PRN
Status: DISCONTINUED | OUTPATIENT
Start: 2018-01-18 | End: 2018-01-24

## 2018-01-18 RX ORDER — ASPIRIN 300 MG/1
300 SUPPOSITORY RECTAL ONCE AS NEEDED
Status: ACTIVE | OUTPATIENT
Start: 2018-01-18 | End: 2018-01-18

## 2018-01-18 RX ORDER — HEPARIN SODIUM 1000 [USP'U]/ML
INJECTION, SOLUTION INTRAVENOUS; SUBCUTANEOUS
Status: DISCONTINUED | OUTPATIENT
Start: 2018-01-18 | End: 2018-01-18

## 2018-01-18 RX ORDER — IPRATROPIUM BROMIDE AND ALBUTEROL SULFATE 2.5; .5 MG/3ML; MG/3ML
3 SOLUTION RESPIRATORY (INHALATION) EVERY 4 HOURS
Status: DISCONTINUED | OUTPATIENT
Start: 2018-01-18 | End: 2018-01-19

## 2018-01-18 RX ORDER — BISACODYL 10 MG
10 SUPPOSITORY, RECTAL RECTAL EVERY 12 HOURS PRN
Status: DISCONTINUED | OUTPATIENT
Start: 2018-01-18 | End: 2018-01-24

## 2018-01-18 RX ORDER — MAGNESIUM SULFATE HEPTAHYDRATE 40 MG/ML
2 INJECTION, SOLUTION INTRAVENOUS
Status: DISCONTINUED | OUTPATIENT
Start: 2018-01-18 | End: 2018-01-19

## 2018-01-18 RX ORDER — SODIUM BICARBONATE 1 MEQ/ML
50 SYRINGE (ML) INTRAVENOUS ONCE
Status: COMPLETED | OUTPATIENT
Start: 2018-01-18 | End: 2018-01-18

## 2018-01-18 RX ORDER — DOCUSATE SODIUM 100 MG/1
100 CAPSULE, LIQUID FILLED ORAL 2 TIMES DAILY
Status: DISCONTINUED | OUTPATIENT
Start: 2018-01-18 | End: 2018-01-24 | Stop reason: HOSPADM

## 2018-01-18 RX ORDER — FENTANYL CITRATE 50 UG/ML
INJECTION, SOLUTION INTRAMUSCULAR; INTRAVENOUS
Status: DISCONTINUED | OUTPATIENT
Start: 2018-01-18 | End: 2018-01-18

## 2018-01-18 RX ORDER — ATORVASTATIN CALCIUM 20 MG/1
40 TABLET, FILM COATED ORAL NIGHTLY
Status: DISCONTINUED | OUTPATIENT
Start: 2018-01-18 | End: 2018-01-20

## 2018-01-18 RX ORDER — MAGNESIUM SULFATE HEPTAHYDRATE 40 MG/ML
4 INJECTION, SOLUTION INTRAVENOUS
Status: DISCONTINUED | OUTPATIENT
Start: 2018-01-18 | End: 2018-01-19

## 2018-01-18 RX ORDER — SODIUM CHLORIDE 9 MG/ML
INJECTION, SOLUTION INTRAVENOUS CONTINUOUS
Status: DISCONTINUED | OUTPATIENT
Start: 2018-01-18 | End: 2018-01-18

## 2018-01-18 RX ORDER — SODIUM CHLORIDE 0.9 % (FLUSH) 0.9 %
3 SYRINGE (ML) INJECTION EVERY 8 HOURS
Status: DISCONTINUED | OUTPATIENT
Start: 2018-01-18 | End: 2018-01-19

## 2018-01-18 RX ORDER — ONDANSETRON 2 MG/ML
4 INJECTION INTRAMUSCULAR; INTRAVENOUS EVERY 12 HOURS PRN
Status: DISCONTINUED | OUTPATIENT
Start: 2018-01-18 | End: 2018-01-24

## 2018-01-18 RX ORDER — POTASSIUM CHLORIDE 750 MG/1
20 CAPSULE, EXTENDED RELEASE ORAL EVERY 12 HOURS
Status: DISCONTINUED | OUTPATIENT
Start: 2018-01-18 | End: 2018-01-18

## 2018-01-18 RX ORDER — OXYCODONE HYDROCHLORIDE 5 MG/1
5 TABLET ORAL EVERY 4 HOURS PRN
Status: DISCONTINUED | OUTPATIENT
Start: 2018-01-18 | End: 2018-01-19

## 2018-01-18 RX ORDER — MUPIROCIN 20 MG/G
1 OINTMENT TOPICAL
Status: COMPLETED | OUTPATIENT
Start: 2018-01-18 | End: 2018-01-18

## 2018-01-18 RX ORDER — POTASSIUM CHLORIDE 14.9 MG/ML
INJECTION INTRAVENOUS CONTINUOUS PRN
Status: DISCONTINUED | OUTPATIENT
Start: 2018-01-18 | End: 2018-01-18

## 2018-01-18 RX ORDER — PROTAMINE SULFATE 10 MG/ML
INJECTION, SOLUTION INTRAVENOUS
Status: DISCONTINUED | OUTPATIENT
Start: 2018-01-18 | End: 2018-01-18

## 2018-01-18 RX ORDER — DEXTROSE MONOHYDRATE, SODIUM CHLORIDE, AND POTASSIUM CHLORIDE 50; 1.49; 4.5 G/1000ML; G/1000ML; G/1000ML
INJECTION, SOLUTION INTRAVENOUS CONTINUOUS
Status: DISCONTINUED | OUTPATIENT
Start: 2018-01-18 | End: 2018-01-19

## 2018-01-18 RX ORDER — LIDOCAINE HYDROCHLORIDE 10 MG/ML
1 INJECTION, SOLUTION EPIDURAL; INFILTRATION; INTRACAUDAL; PERINEURAL
Status: DISCONTINUED | OUTPATIENT
Start: 2018-01-18 | End: 2018-01-18 | Stop reason: HOSPADM

## 2018-01-18 RX ORDER — FENTANYL CITRATE 50 UG/ML
25 INJECTION, SOLUTION INTRAMUSCULAR; INTRAVENOUS
Status: DISCONTINUED | OUTPATIENT
Start: 2018-01-18 | End: 2018-01-20

## 2018-01-18 RX ORDER — CEFAZOLIN SODIUM 1 G/3ML
2 INJECTION, POWDER, FOR SOLUTION INTRAMUSCULAR; INTRAVENOUS
Status: DISCONTINUED | OUTPATIENT
Start: 2018-01-18 | End: 2018-01-19

## 2018-01-18 RX ORDER — FUROSEMIDE 10 MG/ML
20 INJECTION INTRAMUSCULAR; INTRAVENOUS 2 TIMES DAILY
Status: DISCONTINUED | OUTPATIENT
Start: 2018-01-18 | End: 2018-01-18

## 2018-01-18 RX ORDER — MIDAZOLAM HYDROCHLORIDE 1 MG/ML
INJECTION INTRAMUSCULAR; INTRAVENOUS
Status: DISCONTINUED | OUTPATIENT
Start: 2018-01-18 | End: 2018-01-18

## 2018-01-18 RX ORDER — BACITRACIN 50000 [IU]/1
INJECTION, POWDER, FOR SOLUTION INTRAMUSCULAR
Status: DISCONTINUED | OUTPATIENT
Start: 2018-01-18 | End: 2018-01-18 | Stop reason: HOSPADM

## 2018-01-18 RX ORDER — ACETAMINOPHEN 10 MG/ML
INJECTION, SOLUTION INTRAVENOUS
Status: DISCONTINUED | OUTPATIENT
Start: 2018-01-18 | End: 2018-01-18

## 2018-01-18 RX ORDER — ASPIRIN 325 MG
325 TABLET ORAL ONCE
Status: COMPLETED | OUTPATIENT
Start: 2018-01-18 | End: 2018-01-18

## 2018-01-18 RX ORDER — OXYCODONE HYDROCHLORIDE 5 MG/1
5 TABLET ORAL EVERY 4 HOURS PRN
Status: DISCONTINUED | OUTPATIENT
Start: 2018-01-18 | End: 2018-01-24 | Stop reason: HOSPADM

## 2018-01-18 RX ORDER — POLYETHYLENE GLYCOL 3350 17 G/17G
17 POWDER, FOR SOLUTION ORAL DAILY
Status: DISCONTINUED | OUTPATIENT
Start: 2018-01-18 | End: 2018-01-24 | Stop reason: HOSPADM

## 2018-01-18 RX ORDER — PANTOPRAZOLE SODIUM 40 MG/10ML
40 INJECTION, POWDER, LYOPHILIZED, FOR SOLUTION INTRAVENOUS DAILY
Status: DISCONTINUED | OUTPATIENT
Start: 2018-01-18 | End: 2018-01-20

## 2018-01-18 RX ORDER — ASPIRIN 325 MG
325 TABLET ORAL DAILY
Status: DISCONTINUED | OUTPATIENT
Start: 2018-01-18 | End: 2018-01-24 | Stop reason: HOSPADM

## 2018-01-18 RX ORDER — NITROGLYCERIN 5 MG/ML
INJECTION, SOLUTION INTRAVENOUS
Status: DISCONTINUED | OUTPATIENT
Start: 2018-01-18 | End: 2018-01-18

## 2018-01-18 RX ORDER — ASPIRIN 325 MG
325 TABLET, DELAYED RELEASE (ENTERIC COATED) ORAL DAILY
Status: DISCONTINUED | OUTPATIENT
Start: 2018-01-18 | End: 2018-01-18

## 2018-01-18 RX ORDER — PROPOFOL 10 MG/ML
5 INJECTION, EMULSION INTRAVENOUS CONTINUOUS
Status: DISCONTINUED | OUTPATIENT
Start: 2018-01-18 | End: 2018-01-19

## 2018-01-18 RX ORDER — METOCLOPRAMIDE HYDROCHLORIDE 5 MG/ML
5 INJECTION INTRAMUSCULAR; INTRAVENOUS EVERY 6 HOURS PRN
Status: DISCONTINUED | OUTPATIENT
Start: 2018-01-18 | End: 2018-01-24

## 2018-01-18 RX ORDER — ONDANSETRON 2 MG/ML
INJECTION INTRAMUSCULAR; INTRAVENOUS
Status: DISCONTINUED | OUTPATIENT
Start: 2018-01-18 | End: 2018-01-18

## 2018-01-18 RX ORDER — NICARDIPINE HYDROCHLORIDE 0.2 MG/ML
1 INJECTION INTRAVENOUS CONTINUOUS
Status: DISCONTINUED | OUTPATIENT
Start: 2018-01-18 | End: 2018-01-19

## 2018-01-18 RX ORDER — PROPOFOL 10 MG/ML
INJECTION, EMULSION INTRAVENOUS
Status: DISCONTINUED | OUTPATIENT
Start: 2018-01-18 | End: 2018-01-18

## 2018-01-18 RX ORDER — IPRATROPIUM BROMIDE AND ALBUTEROL SULFATE 2.5; .5 MG/3ML; MG/3ML
3 SOLUTION RESPIRATORY (INHALATION) EVERY 4 HOURS PRN
Status: DISCONTINUED | OUTPATIENT
Start: 2018-01-18 | End: 2018-01-24

## 2018-01-18 RX ORDER — PHENYLEPHRINE HYDROCHLORIDE 10 MG/ML
INJECTION INTRAVENOUS
Status: DISCONTINUED | OUTPATIENT
Start: 2018-01-18 | End: 2018-01-18

## 2018-01-18 RX ORDER — MUPIROCIN 20 MG/G
1 OINTMENT TOPICAL 2 TIMES DAILY
Status: DISCONTINUED | OUTPATIENT
Start: 2018-01-18 | End: 2018-01-19

## 2018-01-18 RX ORDER — FENTANYL CITRATE 50 UG/ML
25 INJECTION, SOLUTION INTRAMUSCULAR; INTRAVENOUS
Status: DISCONTINUED | OUTPATIENT
Start: 2018-01-18 | End: 2018-01-18

## 2018-01-18 RX ORDER — TRANEXAMIC ACID 100 MG/ML
INJECTION, SOLUTION INTRAVENOUS
Status: DISCONTINUED | OUTPATIENT
Start: 2018-01-18 | End: 2018-01-18

## 2018-01-18 RX ORDER — PANTOPRAZOLE SODIUM 40 MG/1
40 TABLET, DELAYED RELEASE ORAL
Status: DISCONTINUED | OUTPATIENT
Start: 2018-01-19 | End: 2018-01-18

## 2018-01-18 RX ORDER — ROCURONIUM BROMIDE 10 MG/ML
INJECTION, SOLUTION INTRAVENOUS
Status: DISCONTINUED | OUTPATIENT
Start: 2018-01-18 | End: 2018-01-18

## 2018-01-18 RX ORDER — METOPROLOL TARTRATE 25 MG/1
12.5 TABLET ORAL EVERY 12 HOURS
Status: DISCONTINUED | OUTPATIENT
Start: 2018-01-18 | End: 2018-01-18

## 2018-01-18 RX ORDER — LIDOCAINE HCL/PF 100 MG/5ML
SYRINGE (ML) INTRAVENOUS
Status: DISCONTINUED | OUTPATIENT
Start: 2018-01-18 | End: 2018-01-18

## 2018-01-18 RX ORDER — ALBUMIN HUMAN 50 G/1000ML
25 SOLUTION INTRAVENOUS ONCE
Status: COMPLETED | OUTPATIENT
Start: 2018-01-18 | End: 2018-01-18

## 2018-01-18 RX ORDER — ALBUMIN HUMAN 50 G/1000ML
500 SOLUTION INTRAVENOUS ONCE AS NEEDED
Status: COMPLETED | OUTPATIENT
Start: 2018-01-18 | End: 2018-01-18

## 2018-01-18 RX ORDER — CEFAZOLIN SODIUM 1 G/3ML
2 INJECTION, POWDER, FOR SOLUTION INTRAMUSCULAR; INTRAVENOUS
Status: DISCONTINUED | OUTPATIENT
Start: 2018-01-18 | End: 2018-01-18 | Stop reason: HOSPADM

## 2018-01-18 RX ADMIN — FENTANYL CITRATE 25 MCG: 50 INJECTION, SOLUTION INTRAMUSCULAR; INTRAVENOUS at 08:01

## 2018-01-18 RX ADMIN — FENTANYL CITRATE 150 MCG: 50 INJECTION, SOLUTION INTRAMUSCULAR; INTRAVENOUS at 07:01

## 2018-01-18 RX ADMIN — MUPIROCIN 1 G: 20 OINTMENT TOPICAL at 05:01

## 2018-01-18 RX ADMIN — GELATIN ABSORBABLE SPONGE SIZE 100 1 APPLICATOR: MISC at 08:01

## 2018-01-18 RX ADMIN — BACITRACIN 50000 UNITS: 50000 INJECTION, POWDER, LYOPHILIZED, FOR SOLUTION INTRAMUSCULAR at 02:01

## 2018-01-18 RX ADMIN — MIDAZOLAM HYDROCHLORIDE 2 MG: 1 INJECTION, SOLUTION INTRAMUSCULAR; INTRAVENOUS at 07:01

## 2018-01-18 RX ADMIN — SODIUM CHLORIDE: 0.9 INJECTION, SOLUTION INTRAVENOUS at 06:01

## 2018-01-18 RX ADMIN — PROPOFOL 50 MG: 10 INJECTION, EMULSION INTRAVENOUS at 01:01

## 2018-01-18 RX ADMIN — MUPIROCIN 1 G: 20 OINTMENT TOPICAL at 10:01

## 2018-01-18 RX ADMIN — PROTAMINE SULFATE 30 MG: 10 INJECTION, SOLUTION INTRAVENOUS at 01:01

## 2018-01-18 RX ADMIN — HEPARIN SODIUM 19000 UNITS: 1000 INJECTION, SOLUTION INTRAVENOUS; SUBCUTANEOUS at 08:01

## 2018-01-18 RX ADMIN — TRANEXAMIC ACID 86.2 MG: 100 INJECTION, SOLUTION INTRAVENOUS at 07:01

## 2018-01-18 RX ADMIN — FENTANYL CITRATE 250 MCG: 50 INJECTION, SOLUTION INTRAMUSCULAR; INTRAVENOUS at 07:01

## 2018-01-18 RX ADMIN — CEFAZOLIN 2 G: 330 INJECTION, POWDER, FOR SOLUTION INTRAMUSCULAR; INTRAVENOUS at 11:01

## 2018-01-18 RX ADMIN — NOREPINEPHRINE BITARTRATE 0.01 MG: 1 INJECTION, SOLUTION, CONCENTRATE INTRAVENOUS at 09:01

## 2018-01-18 RX ADMIN — SODIUM CHLORIDE: 0.9 INJECTION, SOLUTION INTRAVENOUS at 07:01

## 2018-01-18 RX ADMIN — CALCIUM CHLORIDE 0.5 G: 100 INJECTION, SOLUTION INTRAVENOUS at 02:01

## 2018-01-18 RX ADMIN — PROPOFOL 100 MG: 10 INJECTION, EMULSION INTRAVENOUS at 07:01

## 2018-01-18 RX ADMIN — SODIUM CHLORIDE: 0.9 INJECTION, SOLUTION INTRAVENOUS at 01:01

## 2018-01-18 RX ADMIN — HYDRALAZINE HYDROCHLORIDE 10 MG: 20 INJECTION INTRAMUSCULAR; INTRAVENOUS at 10:01

## 2018-01-18 RX ADMIN — EPINEPHRINE 0.04 MCG/KG/MIN: 1 INJECTION INTRAMUSCULAR; INTRAVENOUS; SUBCUTANEOUS at 10:01

## 2018-01-18 RX ADMIN — FENTANYL CITRATE 250 MCG: 50 INJECTION, SOLUTION INTRAMUSCULAR; INTRAVENOUS at 08:01

## 2018-01-18 RX ADMIN — FENTANYL CITRATE 25 MCG: 50 INJECTION, SOLUTION INTRAMUSCULAR; INTRAVENOUS at 07:01

## 2018-01-18 RX ADMIN — DEXTROSE 2 G: 50 INJECTION, SOLUTION INTRAVENOUS at 02:01

## 2018-01-18 RX ADMIN — MIDAZOLAM HYDROCHLORIDE 2 MG: 1 INJECTION, SOLUTION INTRAMUSCULAR; INTRAVENOUS at 06:01

## 2018-01-18 RX ADMIN — IPRATROPIUM BROMIDE AND ALBUTEROL SULFATE 3 ML: .5; 3 SOLUTION RESPIRATORY (INHALATION) at 09:01

## 2018-01-18 RX ADMIN — POTASSIUM CHLORIDE: 14.9 INJECTION, SOLUTION INTRAVENOUS at 08:01

## 2018-01-18 RX ADMIN — SODIUM CHLORIDE 1 UNITS/HR: 9 INJECTION, SOLUTION INTRAVENOUS at 04:01

## 2018-01-18 RX ADMIN — PANTOPRAZOLE SODIUM 40 MG: 40 INJECTION, POWDER, FOR SOLUTION INTRAVENOUS at 06:01

## 2018-01-18 RX ADMIN — DEXTROSE 2 G: 50 INJECTION, SOLUTION INTRAVENOUS at 08:01

## 2018-01-18 RX ADMIN — PHENYLEPHRINE HYDROCHLORIDE 100 MCG: 10 INJECTION INTRAVENOUS at 11:01

## 2018-01-18 RX ADMIN — OXYCODONE HYDROCHLORIDE 5 MG: 5 TABLET ORAL at 08:01

## 2018-01-18 RX ADMIN — ALBUMIN (HUMAN) 25 G: 12.5 SOLUTION INTRAVENOUS at 07:01

## 2018-01-18 RX ADMIN — FENTANYL CITRATE 100 MCG: 50 INJECTION, SOLUTION INTRAMUSCULAR; INTRAVENOUS at 07:01

## 2018-01-18 RX ADMIN — NITROGLYCERIN 25 MCG: 5 INJECTION, SOLUTION INTRAVENOUS at 09:01

## 2018-01-18 RX ADMIN — ACETAMINOPHEN 1000 MG: 10 INJECTION, SOLUTION INTRAVENOUS at 01:01

## 2018-01-18 RX ADMIN — ASPIRIN 325 MG ORAL TABLET 325 MG: 325 PILL ORAL at 08:01

## 2018-01-18 RX ADMIN — ALBUMIN (HUMAN) 500 ML: 12.5 SOLUTION INTRAVENOUS at 03:01

## 2018-01-18 RX ADMIN — DEXTROSE MONOHYDRATE, SODIUM CHLORIDE, AND POTASSIUM CHLORIDE: 50; 4.5; 1.49 INJECTION, SOLUTION INTRAVENOUS at 06:01

## 2018-01-18 RX ADMIN — ATORVASTATIN CALCIUM 40 MG: 20 TABLET, FILM COATED ORAL at 08:01

## 2018-01-18 RX ADMIN — PROPOFOL 50 MG: 10 INJECTION, EMULSION INTRAVENOUS at 10:01

## 2018-01-18 RX ADMIN — PROPOFOL 50 MG: 10 INJECTION, EMULSION INTRAVENOUS at 09:01

## 2018-01-18 RX ADMIN — DOCUSATE SODIUM 100 MG: 50 CAPSULE, LIQUID FILLED ORAL at 08:01

## 2018-01-18 RX ADMIN — PROTAMINE SULFATE 180 MG: 10 INJECTION, SOLUTION INTRAVENOUS at 01:01

## 2018-01-18 RX ADMIN — IPRATROPIUM BROMIDE AND ALBUTEROL SULFATE 3 ML: .5; 3 SOLUTION RESPIRATORY (INHALATION) at 05:01

## 2018-01-18 RX ADMIN — FENTANYL CITRATE 25 MCG: 50 INJECTION, SOLUTION INTRAMUSCULAR; INTRAVENOUS at 04:01

## 2018-01-18 RX ADMIN — NOREPINEPHRINE BITARTRATE 0.02 MG: 1 INJECTION, SOLUTION, CONCENTRATE INTRAVENOUS at 09:01

## 2018-01-18 RX ADMIN — Medication 3 ML: at 11:01

## 2018-01-18 RX ADMIN — ROCURONIUM BROMIDE 100 MG: 10 INJECTION, SOLUTION INTRAVENOUS at 07:01

## 2018-01-18 RX ADMIN — SODIUM BICARBONATE 50 MEQ: 84 INJECTION, SOLUTION INTRAVENOUS at 07:01

## 2018-01-18 RX ADMIN — LIDOCAINE HYDROCHLORIDE 100 MG: 20 INJECTION, SOLUTION INTRAVENOUS at 07:01

## 2018-01-18 NOTE — HPI
Alondra Carrera is a 47 y.o. female with who's pmhx includes severe MR with moderate mitral valve prolapse, pulmonary hypertension, white mater lesions, and anxiety. She is a current smoker. She has known she had a murmur since childhood, but in the last 6 months she has noticed an increase in her symptoms.     Intra-op course complicated by severe MR and TRUDY requiring patient to go back on CPB. After correction of valve placement patient was able to successfully about to come off of pump. She arrived to SICU extubated on 15L facemask on 5 of cardene

## 2018-01-18 NOTE — CONSULTS
[facility]  Critical Care -Surgery  Consult Note    Patient Name: Alondra Carrera  MRN: [id number]  Admission Date: 1/18/2018  Hospital Length of Stay: 0 days  Code Status: Full Code  Attending Physician: Randall Sorensen MD  Primary Care Provider: Nita De La Cruz MD   Principal Problem: <principal problem not specified>    Consults  Subjective:     HPI:  Alondra Carrera is a 47 y.o. female with who's pmhx includes severe MR with moderate mitral valve prolapse, pulmonary hypertension, white mater lesions, and anxiety. She is a current smoker. She has known she had a murmur since childhood, but in the last 6 months she has noticed an increase in her symptoms.     Intra-op course complicated by severe MR and TRUDY requiring patient to go back on CPB. After correction of valve placement patient was able to successfully about to come off of pump. She arrived to SICU extubated on 15L NC and 5mg of cardene.    Follow-up For: Procedure(s) (LRB):  Mitral Valve Repair (N/A)    Post-Operative Day: Day of Surgery     Past Medical History:   Diagnosis Date    Genital herpes, unspecified     Hypertension     Mental disorder     Migraines      Past Surgical History:   Procedure Laterality Date    ECTOPIC PREGNANCY SURGERY      EXPLORATORY LAPAROTOMY W/ BOWEL RESECTION      TUBAL LIGATION       Review of patient's allergies indicates:   Allergen Reactions    Sulfa (sulfonamide antibiotics)      Family History     None        Social History Main Topics    Smoking status: Current Every Day Smoker    Smokeless tobacco: Not on file    Alcohol use Yes    Drug use: No    Sexual activity: Yes     Partners: Male      Review of Systems  Objective:     Vital Signs (Most Recent):  Temp: 98.9 °F (37.2 °C) (01/18/18 0543)  Pulse: 80 (01/18/18 0543)  Resp: 16 (01/18/18 0543)  BP: 127/84 (01/18/18 0543)  SpO2: 96 % (01/18/18 0543) Vital Signs (24h Range):  Temp:  [98.9 °F (37.2 °C)] 98.9 °F (37.2 °C)  Pulse:  [80]  80  Resp:  [16] 16  SpO2:  [96 %] 96 %  BP: (127)/(84) 127/84     Weight: 86.2 kg (190 lb)  Body mass index is 29.76 kg/m².      Intake/Output Summary (Last 24 hours) at 01/18/18 1448  Last data filed at 01/18/18 1448   Gross per 24 hour   Intake             3608 ml   Output             1250 ml   Net             2358 ml     Physical Exam    Vents:       Lines/Drains/Airways     Central Venous Catheter Line                 Introducer 01/18/18 0720 right internal jugular less than 1 day          Drain                 Urethral Catheter 01/18/18 0748 Temperature probe 16 Fr. less than 1 day         Y Chest Tube 1 and 2 01/18/18 1328 1 Mediastinal 19 Fr. 2 Mediastinal 19 Fr. less than 1 day          Airway                 Airway - Non-Surgical 01/18/18 0718 Endotracheal Tube less than 1 day          Epidural Line                 Perineural Analgesia/Anesthesia Assessment (using dermatomes) Epidural 01/18/18 0801 less than 1 day          Arterial Line                 Arterial Line 01/18/18 0711 Left Radial less than 1 day          Line                 Pacer Wires 01/18/18 1322 less than 1 day          Peripheral Intravenous Line                 Peripheral IV - Single Lumen 01/18/18 0600 Left Hand less than 1 day         Peripheral IV - Single Lumen 01/18/18 0730 Right Hand less than 1 day              Significant Labs:  CBC/Anemia Profile:    Recent Labs  Lab 01/18/18  1130 01/18/18  1203 01/18/18  1232   HCT 24* 27* 26*        Chemistries:  No results for input(s): NA, K, CL, CO2, BUN, CREATININE, CALCIUM, ALBUMIN, PROT, BILITOT, ALKPHOS, ALT, AST, GLUCOSE, MG, PHOS in the last 48 hours.    ABGs:   Recent Labs  Lab 01/18/18  1232   PH 7.397   PCO2 40.4   HCO3 24.9   POCSATURATED 100   BE 0     Blood Culture: No results for input(s): LABBLOO in the last 48 hours.  CMP: No results for input(s): NA, K, CL, CO2, GLU, BUN, CREATININE, CALCIUM, PROT, ALBUMIN, BILITOT, ALKPHOS, AST, ALT, ANIONGAP, EGFRNONAA in the last 48  hours.    Invalid input(s): ESTGFAFRICA  Cardiac Markers: No results for input(s): CKMB, TROPONINT, MYOGLOBIN in the last 48 hours.  Coagulation: No results for input(s): PT, INR, APTT in the last 48 hours.  Lactic Acid: No results for input(s): LACTATE in the last 48 hours.  POCT Glucose: No results for input(s): POCTGLUCOSE in the last 48 hours.    Significant Imaging: I have reviewed all pertinent imaging results/findings within the past 24 hours.    Assessment/Plan:     Mitral valve insufficiency    Alondra Carrera is a 47 y.o. female with a pmhx of severe MR with MVP who is now s/p MVR.    Neuro:  - AAOx3  - pain control: per CTS    Cardiac:  - Severe MR s/p MVR: doing well post procedure  - hypertensive on arrival: will wean cardene gtt as tolerated  -   - statin therapy    Respiratory:  - on NC, wean oxygen as tolerated  - f/u CXR on POD1  - PRN ABG    Renal:  - montague in place  - monitor UOP  - monitor Cr/ BUN    Endocrine:  - hyperglycemia: wean insulin gtt as tolerated  - q1hr glucose  - endocrine consulted    ID:  - continue post-op abx x5 doses  - will follow WBC    Heme:  - CBC and INR ordered will follow up  - transfuse if needed    FEN/GI:  - MIVF  - NPO advance per CTS   - replace lytes as needed  - protonix ordered    Dispo:  -Continue care in SICU, [facility] primary          Sebastian Rizvi MD  Critical Care - Surgery  [facility]

## 2018-01-18 NOTE — ASSESSMENT & PLAN NOTE
Agree with above note by Jamila Farris Np.  Patient has brought in her [facility] images.  She has no occlusive coronary artery disease.  Plan remains for MVr. If she needs to have a replacement, she wants to have a mechanical valve.  She understands this means lifelong coumadin.

## 2018-01-18 NOTE — ANESTHESIA PROCEDURE NOTES
Arterial    Diagnosis: Mitral valve regurgitation     Patient location during procedure: done in OR  Procedure start time: 1/18/2018 7:11 AM  Timeout: 1/18/2018 7:10 AM  Procedure end time: 1/18/2018 7:13 AM  Staffing  Anesthesiologist: EMIL DOYLE  Resident/CRNA: NELY GLEASON  Performed: resident/CRNA   Anesthesiologist was present at the time of the procedure.  Preanesthetic Checklist  Completed: patient identified, site marked, surgical consent, pre-op evaluation, timeout performed, IV checked, risks and benefits discussed, monitors and equipment checked and anesthesia consent givenArterial  Skin Prep: chlorhexidine gluconate  Local Infiltration: none  Orientation: left  Location: radial  Catheter Size: 20 G  Catheter placement by Anatomical landmarks. Heme positive aspiration all ports.Insertion Attempts: 2  Assessment  Dressing: secured with tape and tegaderm  Patient: Tolerated well

## 2018-01-18 NOTE — BRIEF OP NOTE
[facility]  Brief Operative Note    SUMMARY     Surgery Date: 1/18/2018     Surgeon(s) and Role:     * Randall Sorensen MD - Primary     * Bryan Juarez MD - Fellow    Pre-op Diagnosis:  Mitral valve insufficiency, unspecified etiology [I34.0]    Post-op Diagnosis:  Post-Op Diagnosis Codes:     * Mitral valve insufficiency, unspecified etiology [I34.0]    Procedure(s) (LRB):  Mitral Valve Repair with quadrangular resection of P2, sliding plasty, and 29 mm [employer] Simulus band   29370    Anesthesia: General    Description of Procedure: Initially repaired with 29mm ATS simulus band.  Significant TRUDY resulted.  Returned to cardiopulmonary bypass and performed a quadrangular resection of the P2 scallop of the posterior leaflet of the mitral valve and a sliding-plasty of P1 and P3 and placement of a new 29mm ATS simulus band.  No TRUDY and no MR at conclusion.    Description of the findings of the procedure: Two mediastinal Magdiel drains.  Count had an extra pair of forceps, thus an x-ray was performed.  No retained foreign bodies.    Estimated Blood Loss: 100mL         Specimens:   Specimen (12h ago through future)    Start     Ordered    01/18/18 1307  Specimen to Pathology - Surgery  Once     Comments:  1. P2 scallop of posterior leaflet of mitral valve - PERM      01/18/18 1307        Implants:    Implant Name Type Inv. Item Serial No.  Lot No. LRB No. Used   BAND ATS FLX- C [id number] - [id number]  BAND ATS FLX- C [id number] [employer] [facility] [id number] N/A 1   BAND ATS FLX- C [id number] - [id number]   BAND ATS FLX- C [id number] [employer] [place] [id number] N/A 1     Bryan Juarez MD  Thoracic Surgery Resident, PGY6  Cardiothoracic Surgery  [facility]    Attestation:  I was present and scrubbed for the procedure.

## 2018-01-18 NOTE — ANESTHESIA PROCEDURE NOTES
Baseline JAH    Diagnosis: severe MR  Patient location during procedure: OR  Procedure start time: 1/18/2018 8:01 AM  Timeout: 1/18/2018 8:00 AM  Procedure end time: 1/18/2018 8:15 AM  Surgery related to: severe MR  Exam type: Baseline  Staffing  Anesthesiologist: EMIL DOYLE  Other anesthesia staff: DAVONTE CASTILLO  Performed: anesthesiologist and other anesthesia staff   Preanesthetic Checklist  Completed: patient identified, surgical consent, pre-op evaluation, timeout performed, risks and benefits discussed, monitors and equipment checked, anesthesia consent given, oxygen available, suction available, hand hygiene performed and patient being monitored  Setup & Induction  Patient preparation: bite block inserted  Probe Insertion: easy  Exam: complete  Exam     Left Heart    Left appendage velocity:50        LVAD:no  Estimated Ejection Fraction: > 55% normal  Regional Wall Abnormalities: no RWMA            Right Heart  Right Ventricle: normal  Right Ventricle Function: normal    Intra Atrial Septum  PFO: no shunt by color flow doppler  no IAS aneurysm  no lipomatous hypertrophy  no Atrial Septal Defect (Asd)    Right Ventricle  Size: normal    Aortic Valve:  Stenosis: none  Morphology: trileaflet  Regurgitation: no aortic valve regurgitation     Mitral Valve:  Prolapse: P1 and P2  Jet Description: severe and eccentric    Tricuspid Valve:  Morphology: normal  Regurgitation: moderate    Pulmonic Valve:  Morphology:normal  Regurgitation(color flow): mild    Great Vessels  Ascending Aorta Atherosclerosis: 1=nl-min dz  Aortic Arch Atherosclerosis: 1=nl-min dz  IABP: no  Descending Aorta Atherosclerosis: 1=nl-min dz  Aorta    Descending aorta IABP: no    Effusions  Effusions: none    Summary  Findings discussed with surgeon.    Other Findings   Normal biventricular function  Severe MR with prolapsed P1 and P2, anteriorly directed jet  Moderate TR  All other valves structurally and functionally normal  No PFO

## 2018-01-18 NOTE — ANESTHESIA PROCEDURE NOTES
Bilateral Transverse Thoracic Plane Block    Patient location during procedure: OR   Block not for primary anesthetic.  Reason for block: at surgeon's request and post-op pain management   Post-op Pain Location: sternum  Start time: 1/18/2018 8:01 AM  Timeout: 1/18/2018 8:00 AM   End time: 1/18/2018 8:15 AM  Surgery related to: severe MR  Staffing  Anesthesiologist: EMIL DOYLE  Resident/CRNA: NELY GLEASON  Performed: anesthesiologist and resident/CRNA   Preanesthetic Checklist  Completed: patient identified, site marked, surgical consent, pre-op evaluation, timeout performed, IV checked, risks and benefits discussed and monitors and equipment checked  Peripheral Block  Patient position: supine  Prep: ChloraPrep  Patient monitoring: heart rate, cardiac monitor, continuous pulse ox, continuous capnometry and frequent blood pressure checks  Anesthesia block type: transverse thoracic plane block.  Laterality: bilateral  Injection technique: single shot  Needle  Needle type: Stimuplex   Needle length: 2 in  Needle localization: ultrasound guidance   -ultrasound image captured on disc.  Assessment  Injection assessment: negative aspiration, negative parasthesia and local visualized surrounding nerve  Paresthesia pain: none  Heart rate change: no  Slow fractionated injection: yes  Medications:  Bolus administered: 30 mL of 0.5 bupivacaine  Epinephrine added: 3.75 mcg/mL (1/300,000)  Additional Notes  15cc of bupivacaine with 1/300k epinephrine injected to either side with ultrasound with no evidence of pneumothorax or intravascular injection

## 2018-01-18 NOTE — ASSESSMENT & PLAN NOTE
Alondra Carrera is a 47 y.o. female with a pmhx of severe MR with MVP who is now s/p MVR.    Neuro:  - AAOx3  - pain control: per CTS    Cardiac:  - Severe MR s/p MVR: doing well post procedure  - hypertensive on arrival: will wean cardene gtt as tolerated  -   - statin therapy    Respiratory:  - on NC, wean oxygen as tolerated  - f/u CXR on POD1  - PRN ABG    Renal:  - montague in place  - monitor UOP  - monitor Cr/ BUN    Endocrine:  - hyperglycemia: wean insulin gtt as tolerated  - q1hr glucose  - endocrine consulted    ID:  - continue post-op abx x5 doses  - will follow WBC    Heme:  - CBC and INR ordered will follow up  - transfuse if needed    FEN/GI:  - MIVF  - NPO advance per CTS   - replace lytes as needed  - protonix ordered    Dispo:  -Continue care in SICU, [facility] primary

## 2018-01-18 NOTE — SUBJECTIVE & OBJECTIVE
Follow-up For: Procedure(s) (LRB):  Mitral Valve Repair (N/A)    Post-Operative Day: Day of Surgery     Past Medical History:   Diagnosis Date    Genital herpes, unspecified     Hypertension     Mental disorder     Migraines      Past Surgical History:   Procedure Laterality Date    ECTOPIC PREGNANCY SURGERY      EXPLORATORY LAPAROTOMY W/ BOWEL RESECTION      TUBAL LIGATION       Review of patient's allergies indicates:   Allergen Reactions    Sulfa (sulfonamide antibiotics)      Family History     None        Social History Main Topics    Smoking status: Current Every Day Smoker    Smokeless tobacco: Not on file    Alcohol use Yes    Drug use: No    Sexual activity: Yes     Partners: Male      Review of Systems  Objective:     Vital Signs (Most Recent):  Temp: 98.9 °F (37.2 °C) (01/18/18 0543)  Pulse: 80 (01/18/18 0543)  Resp: 16 (01/18/18 0543)  BP: 127/84 (01/18/18 0543)  SpO2: 96 % (01/18/18 0543) Vital Signs (24h Range):  Temp:  [98.9 °F (37.2 °C)] 98.9 °F (37.2 °C)  Pulse:  [80] 80  Resp:  [16] 16  SpO2:  [96 %] 96 %  BP: (127)/(84) 127/84     Weight: 86.2 kg (190 lb)  Body mass index is 29.76 kg/m².      Intake/Output Summary (Last 24 hours) at 01/18/18 1448  Last data filed at 01/18/18 1448   Gross per 24 hour   Intake             3608 ml   Output             1250 ml   Net             2358 ml     Physical Exam    Vents:       Lines/Drains/Airways     Central Venous Catheter Line                 Introducer 01/18/18 0720 right internal jugular less than 1 day          Drain                 Urethral Catheter 01/18/18 0748 Temperature probe 16 Fr. less than 1 day         Y Chest Tube 1 and 2 01/18/18 1328 1 Mediastinal 19 Fr. 2 Mediastinal 19 Fr. less than 1 day          Airway                 Airway - Non-Surgical 01/18/18 0718 Endotracheal Tube less than 1 day          Epidural Line                 Perineural Analgesia/Anesthesia Assessment (using dermatomes) Epidural 01/18/18 0801 less than 1 day           Arterial Line                 Arterial Line 01/18/18 0711 Left Radial less than 1 day          Line                 Pacer Wires 01/18/18 1322 less than 1 day          Peripheral Intravenous Line                 Peripheral IV - Single Lumen 01/18/18 0600 Left Hand less than 1 day         Peripheral IV - Single Lumen 01/18/18 0730 Right Hand less than 1 day              Significant Labs:  CBC/Anemia Profile:    Recent Labs  Lab 01/18/18  1130 01/18/18  1203 01/18/18  1232   HCT 24* 27* 26*        Chemistries:  No results for input(s): NA, K, CL, CO2, BUN, CREATININE, CALCIUM, ALBUMIN, PROT, BILITOT, ALKPHOS, ALT, AST, GLUCOSE, MG, PHOS in the last 48 hours.    ABGs:   Recent Labs  Lab 01/18/18  1232   PH 7.397   PCO2 40.4   HCO3 24.9   POCSATURATED 100   BE 0     Blood Culture: No results for input(s): LABBLOO in the last 48 hours.  CMP: No results for input(s): NA, K, CL, CO2, GLU, BUN, CREATININE, CALCIUM, PROT, ALBUMIN, BILITOT, ALKPHOS, AST, ALT, ANIONGAP, EGFRNONAA in the last 48 hours.    Invalid input(s): ESTGFAFRICA  Cardiac Markers: No results for input(s): CKMB, TROPONINT, MYOGLOBIN in the last 48 hours.  Coagulation: No results for input(s): PT, INR, APTT in the last 48 hours.  Lactic Acid: No results for input(s): LACTATE in the last 48 hours.  POCT Glucose: No results for input(s): POCTGLUCOSE in the last 48 hours.    Significant Imaging: I have reviewed all pertinent imaging results/findings within the past 24 hours.

## 2018-01-18 NOTE — HPI
Alondra Carrera is a 47 y.o. female who present as a referral from Dr. Garner as a consult for surgical intervention for her severe MR with MV prolapse. Her PMHx includes severe MR with moderate mitral valve prolapse, pulmonary hypertension, white mater lesions, and anxiety. She is a current smoker. She endorses fatigue, SOB with ADLs, chest pain, palpitations, and orthopnea. She has known she had a murmur since childhood, but in the last 6 months she has noticed an increase in her symptoms.

## 2018-01-18 NOTE — ANESTHESIA PROCEDURE NOTES
Final JAH    Diagnosis: severe MR  Patient location during procedure: OR  Procedure start time: 1/18/2018 1:16 PM  Timeout: 1/18/2018 1:15 PM  Procedure end time: 1/18/2018 1:30 PM  Surgery related to: severe MR  Exam type: Final  Staffing  Anesthesiologist: EMIL DOYLE  Other anesthesia staff: DAVONTE CASTILLO  Performed: anesthesiologist and other anesthesia staff   Preanesthetic Checklist  Completed: patient identified, surgical consent, pre-op evaluation, timeout performed, risks and benefits discussed, monitors and equipment checked, anesthesia consent given, oxygen available, suction available, hand hygiene performed and patient being monitored    Exam  Estimated Ejection Fraction: > 55% normal  Regional Wall Abnormalities: no RWMA        Right Heart  Right Ventricle dilated: no  Right Ventricle Function: normal      Aortic Valve:  Prosthesis: none  Regurgitation(color flow): 0-tr     Mitral Valve:  Prosthesis: ring  Perivalvlular Leak: no  Regurgitation(color flow): 1+     Tricuspid Valve:  Prosthesis: none  Regurgitation: 2+    Pulmonic Valve:  Prosthesis: none  Regurgitation(color flow): 1+      Aorta  Descending Aorta dissection: no  Descending aorta IABP: no  Aortic Arch Dissection: no  Ascending Aorta Dissection: no    LVAD:no        Effusions    Summary    Other Findings  S/p MVr with quadrangular resection and partial ring  No mitral stenosis, no TRUDY, trace to mild MR  TR unchanged

## 2018-01-18 NOTE — H&P
[facility]  Cardiothoracic Surgery  History & Physical    Patient Name: Alondra Carrera  MRN: [id number]  Admission Date: 1/18/2018  Attending Physician: Randall Sorensen MD  Referring Provider: Randall Sorensen MD    Patient information was obtained from patient, relative(s) and past medical records.     Subjective:     Chief Complaint/Reason for Admission: severe MR    History of Present Illness:   Subjective:      Patient ID: Alondra Carrera is a 47 y.o. female.     Chief Complaint: Consult        HPI:  Alondra Carrera is a 47 y.o. female who present as a referral from Dr. Garner as a consult for surgical intervention for her severe MR with MV prolapse. Her PMHx includes severe MR with moderate mitral valve prolapse, pulmonary hypertension, white mater lesions, and anxiety. She is a current smoker. She endorses fatigue, SOB with ADLs, chest pain, palpitations, and orthopnea. She has known she had a murmur since childhood, but in the last 6 months she has noticed an increase in her symptoms.              Review of patient's allergies indicates:   Allergen Reactions    Sulfa (sulfonamide antibiotics)                Past Medical History:   Diagnosis Date    Genital herpes, unspecified      Hypertension      Mental disorder      Migraines                  Past Surgical History:   Procedure Laterality Date    ECTOPIC PREGNANCY SURGERY        EXPLORATORY LAPAROTOMY W/ BOWEL RESECTION        TUBAL LIGATION                Family History      None          Social History   Social History                Social History    Marital status:        Spouse name: N/A    Number of children: N/A    Years of education: N/A            Occupational History    Not on file.                Social History Main Topics    Smoking status: Current Every Day Smoker    Smokeless tobacco: Not on file    Alcohol use Yes    Drug use: No    Sexual activity: Yes       Partners: Male               Other Topics Concern    Not on file            Social History Narrative    No narrative on file            Current medications Reviewed     Review of Systems   Constitutional: Positive for activity change and fatigue. Negative for appetite change and fever.   HENT: Negative for congestion, dental problem, sneezing and sore throat.    Eyes: Negative for pain, discharge and visual disturbance.   Respiratory: Positive for shortness of breath. Negative for cough and wheezing.    Cardiovascular: Positive for chest pain and palpitations. Negative for leg swelling.   Gastrointestinal: Negative for abdominal distention, abdominal pain, constipation, diarrhea, nausea and vomiting.   Endocrine: Negative for polydipsia, polyphagia and polyuria.   Genitourinary: Negative for dysuria, frequency and urgency.   Musculoskeletal: Negative for back pain and gait problem.   Skin: Negative for rash and wound.   Neurological: Positive for dizziness, light-headedness and headaches. Negative for seizures and syncope.   Hematological: Does not bruise/bleed easily.   Psychiatric/Behavioral: Negative for agitation and confusion. The patient is nervous/anxious.       Objective:   Physical Exam   Constitutional: She is oriented to person, place, and time. She appears well-developed and well-nourished.   HENT:   Head: Normocephalic and atraumatic.   Eyes: Pupils are equal, round, and reactive to light.   Neck: Normal range of motion. Neck supple.   Cardiovascular: Normal rate and regular rhythm.    Murmur heard.  Pulmonary/Chest: Effort normal and breath sounds normal.   Abdominal: Soft. Bowel sounds are normal.   Musculoskeletal: Normal range of motion.   Neurological: She is alert and oriented to person, place, and time.   Skin: Skin is warm and dry.   Psychiatric: She has a normal mood and affect. Her behavior is normal.      Diagnostic Results:   2D ECHO- outside records  -EF 65%  -atrial septal aneurysm   -Moderate MVprolapse    -Mod-sev MR  -mild TR  -PAS 41  -mind-mod SC  Assessment:   Alondra Carrera is a 47 y.o. female who present as a referral from Dr. Garner as a consult for surgical intervention for her severe MR with MV prolapse.  Plan:    Plans for MVr this week. Pt will bring in disk from [facility] at [facility].     Jamila Farris NP    No new subjective & objective note has been filed under this hospital service since the last note was generated.    Assessment/Plan:     NYHA Score: NYHA III: marked limitation of physical activity, comfortable at rest    Mitral valve insufficiency    Agree with above note by Jamila Farris Np.  Patient has brought in her [facility] images.  She has no occlusive coronary artery disease.  Plan remains for MVr. If she needs to have a replacement, she wants to have a mechanical valve.  She understands this means lifelong coumadin.            Bryan Juarez MD  Cardiothoracic Surgery  [facility]

## 2018-01-18 NOTE — ANESTHESIA PROCEDURE NOTES
Central Line    Diagnosis: Mitral valve reguritation   Patient location during procedure: done in OR  Procedure start time: 1/18/2018 7:20 AM  Timeout: 1/18/2018 7:19 AM  Procedure end time: 1/18/2018 7:51 AM  Staffing  Anesthesiologist: EMIL DOYLE  Resident/CRNA: NELY GLEASON  Performed: resident/CRNA   Anesthesiologist was present at the time of the procedure.  Preanesthetic Checklist  Completed: patient identified, site marked, surgical consent, pre-op evaluation, timeout performed, IV checked, risks and benefits discussed, monitors and equipment checked and anesthesia consent given  Indication  Indication: hemodynamic monitoring, vascular access, med administration     Anesthesia   general anesthesia    Central Line  Skin Prep: skin prepped with ChloraPrep, skin prep agent completely dried prior to procedure  maximum sterile barriers used during central venous catheter insertion  hand hygiene performed prior to central venous catheter insertion  Location: right internal jugular,   Catheter type: introducer  Catheter Size: 9 Fr  Inserted Catheter Length: 11.5 cm  Ultrasound: vascular probe with ultrasound  Vessel Caliber: medium, patent  Vascular Doppler:  not done, compressibility normal  Needle advanced into vessel with real time Ultrasound guidance.  Guidewire confirmed in vessel.  Sterile sheath used.  Image recorded and saved.   Manometry: Venous cannualation confirmed by visual estimation of blood vessel pressure using manometry.  Insertion Attempts: 2   Securement:line sutured, chlorhexidine patch, sterile dressing applied and blood return through all ports     Post-Procedure  X-Ray: successful placement  Adverse Events:none

## 2018-01-19 PROBLEM — R73.9 STRESS HYPERGLYCEMIA: Status: ACTIVE | Noted: 2018-01-19

## 2018-01-19 PROBLEM — I34.0 MITRAL VALVE INSUFFICIENCY: Status: ACTIVE | Noted: 2018-01-19

## 2018-01-19 PROBLEM — I34.0 MITRAL VALVE INSUFFICIENCY: Status: RESOLVED | Noted: 2017-12-12 | Resolved: 2018-01-19

## 2018-01-19 PROBLEM — Z98.890 S/P MVR (MITRAL VALVE REPAIR): Status: ACTIVE | Noted: 2018-01-19

## 2018-01-19 LAB
ALLENS TEST: ABNORMAL
ANION GAP SERPL CALC-SCNC: 6 MMOL/L
APTT BLDCRRT: 29.6 SEC
BASOPHILS # BLD AUTO: 0.01 K/UL
BASOPHILS NFR BLD: 0.1 %
BUN SERPL-MCNC: 9 MG/DL
CALCIUM SERPL-MCNC: 8.3 MG/DL
CHLORIDE SERPL-SCNC: 116 MMOL/L
CO2 SERPL-SCNC: 22 MMOL/L
CREAT SERPL-MCNC: 0.8 MG/DL
DIFFERENTIAL METHOD: ABNORMAL
EOSINOPHIL # BLD AUTO: 0 K/UL
EOSINOPHIL NFR BLD: 0 %
ERYTHROCYTE [DISTWIDTH] IN BLOOD BY AUTOMATED COUNT: 13.8 %
EST. GFR  (AFRICAN AMERICAN): >60 ML/MIN/1.73 M^2
EST. GFR  (NON AFRICAN AMERICAN): >60 ML/MIN/1.73 M^2
GLUCOSE SERPL-MCNC: 118 MG/DL
HCO3 UR-SCNC: 21.5 MMOL/L (ref 24–28)
HCT VFR BLD AUTO: 25.4 %
HGB BLD-MCNC: 8.1 G/DL
IMM GRANULOCYTES # BLD AUTO: 0.03 K/UL
IMM GRANULOCYTES NFR BLD AUTO: 0.3 %
INR PPP: 1.2
LYMPHOCYTES # BLD AUTO: 0.5 K/UL
LYMPHOCYTES NFR BLD: 4.7 %
MAGNESIUM SERPL-MCNC: 2.3 MG/DL
MCH RBC QN AUTO: 26.7 PG
MCHC RBC AUTO-ENTMCNC: 31.9 G/DL
MCV RBC AUTO: 84 FL
MONOCYTES # BLD AUTO: 0.7 K/UL
MONOCYTES NFR BLD: 6.5 %
NEUTROPHILS # BLD AUTO: 9 K/UL
NEUTROPHILS NFR BLD: 88.4 %
NRBC BLD-RTO: 0 /100 WBC
PCO2 BLDA: 38.9 MMHG (ref 35–45)
PH SMN: 7.35 [PH] (ref 7.35–7.45)
PHOSPHATE SERPL-MCNC: 2.5 MG/DL
PLATELET # BLD AUTO: 82 K/UL
PMV BLD AUTO: 11.4 FL
PO2 BLDA: 125 MMHG (ref 80–100)
POC BE: -4 MMOL/L
POC SATURATED O2: 99 % (ref 95–100)
POC TCO2: 23 MMOL/L (ref 23–27)
POCT GLUCOSE: 102 MG/DL (ref 70–110)
POCT GLUCOSE: 117 MG/DL (ref 70–110)
POCT GLUCOSE: 118 MG/DL (ref 70–110)
POCT GLUCOSE: 119 MG/DL (ref 70–110)
POCT GLUCOSE: 119 MG/DL (ref 70–110)
POCT GLUCOSE: 122 MG/DL (ref 70–110)
POCT GLUCOSE: 123 MG/DL (ref 70–110)
POCT GLUCOSE: 123 MG/DL (ref 70–110)
POCT GLUCOSE: 124 MG/DL (ref 70–110)
POCT GLUCOSE: 124 MG/DL (ref 70–110)
POCT GLUCOSE: 126 MG/DL (ref 70–110)
POCT GLUCOSE: 127 MG/DL (ref 70–110)
POCT GLUCOSE: 128 MG/DL (ref 70–110)
POCT GLUCOSE: 129 MG/DL (ref 70–110)
POCT GLUCOSE: 129 MG/DL (ref 70–110)
POCT GLUCOSE: 130 MG/DL (ref 70–110)
POCT GLUCOSE: 132 MG/DL (ref 70–110)
POCT GLUCOSE: 134 MG/DL (ref 70–110)
POCT GLUCOSE: 134 MG/DL (ref 70–110)
POCT GLUCOSE: 136 MG/DL (ref 70–110)
POCT GLUCOSE: 137 MG/DL (ref 70–110)
POCT GLUCOSE: 142 MG/DL (ref 70–110)
POCT GLUCOSE: 149 MG/DL (ref 70–110)
POCT GLUCOSE: 156 MG/DL (ref 70–110)
POCT GLUCOSE: 185 MG/DL (ref 70–110)
POTASSIUM SERPL-SCNC: 3.7 MMOL/L
POTASSIUM SERPL-SCNC: 4 MMOL/L
PROTHROMBIN TIME: 11.9 SEC
RBC # BLD AUTO: 3.03 M/UL
SAMPLE: ABNORMAL
SITE: ABNORMAL
SODIUM SERPL-SCNC: 144 MMOL/L
WBC # BLD AUTO: 10.13 K/UL

## 2018-01-19 PROCEDURE — 25000003 PHARM REV CODE 250: Performed by: NURSE PRACTITIONER

## 2018-01-19 PROCEDURE — 97116 GAIT TRAINING THERAPY: CPT

## 2018-01-19 PROCEDURE — 25000003 PHARM REV CODE 250: Performed by: THORACIC SURGERY (CARDIOTHORACIC VASCULAR SURGERY)

## 2018-01-19 PROCEDURE — 99222 1ST HOSP IP/OBS MODERATE 55: CPT | Mod: ,,, | Performed by: NURSE PRACTITIONER

## 2018-01-19 PROCEDURE — 37799 UNLISTED PX VASCULAR SURGERY: CPT

## 2018-01-19 PROCEDURE — 80048 BASIC METABOLIC PNL TOTAL CA: CPT

## 2018-01-19 PROCEDURE — 27000221 HC OXYGEN, UP TO 24 HOURS

## 2018-01-19 PROCEDURE — 94640 AIRWAY INHALATION TREATMENT: CPT

## 2018-01-19 PROCEDURE — 20600001 HC STEP DOWN PRIVATE ROOM

## 2018-01-19 PROCEDURE — 84100 ASSAY OF PHOSPHORUS: CPT

## 2018-01-19 PROCEDURE — 25000003 PHARM REV CODE 250: Performed by: STUDENT IN AN ORGANIZED HEALTH CARE EDUCATION/TRAINING PROGRAM

## 2018-01-19 PROCEDURE — C9113 INJ PANTOPRAZOLE SODIUM, VIA: HCPCS | Performed by: NURSE PRACTITIONER

## 2018-01-19 PROCEDURE — 99900035 HC TECH TIME PER 15 MIN (STAT)

## 2018-01-19 PROCEDURE — 63600175 PHARM REV CODE 636 W HCPCS: Performed by: NURSE PRACTITIONER

## 2018-01-19 PROCEDURE — 85025 COMPLETE CBC W/AUTO DIFF WBC: CPT

## 2018-01-19 PROCEDURE — 97802 MEDICAL NUTRITION INDIV IN: CPT

## 2018-01-19 PROCEDURE — 63600175 PHARM REV CODE 636 W HCPCS: Performed by: STUDENT IN AN ORGANIZED HEALTH CARE EDUCATION/TRAINING PROGRAM

## 2018-01-19 PROCEDURE — 83735 ASSAY OF MAGNESIUM: CPT

## 2018-01-19 PROCEDURE — A4216 STERILE WATER/SALINE, 10 ML: HCPCS | Performed by: NURSE PRACTITIONER

## 2018-01-19 PROCEDURE — 97535 SELF CARE MNGMENT TRAINING: CPT

## 2018-01-19 PROCEDURE — 82803 BLOOD GASES ANY COMBINATION: CPT

## 2018-01-19 PROCEDURE — 84132 ASSAY OF SERUM POTASSIUM: CPT

## 2018-01-19 PROCEDURE — 97165 OT EVAL LOW COMPLEX 30 MIN: CPT

## 2018-01-19 PROCEDURE — 85730 THROMBOPLASTIN TIME PARTIAL: CPT

## 2018-01-19 PROCEDURE — 85610 PROTHROMBIN TIME: CPT

## 2018-01-19 PROCEDURE — 97161 PT EVAL LOW COMPLEX 20 MIN: CPT

## 2018-01-19 PROCEDURE — 63600175 PHARM REV CODE 636 W HCPCS: Performed by: THORACIC SURGERY (CARDIOTHORACIC VASCULAR SURGERY)

## 2018-01-19 PROCEDURE — 25000242 PHARM REV CODE 250 ALT 637 W/ HCPCS: Performed by: NURSE PRACTITIONER

## 2018-01-19 RX ORDER — FUROSEMIDE 10 MG/ML
40 INJECTION INTRAMUSCULAR; INTRAVENOUS 2 TIMES DAILY
Status: DISCONTINUED | OUTPATIENT
Start: 2018-01-19 | End: 2018-01-20

## 2018-01-19 RX ORDER — MUPIROCIN 20 MG/G
1 OINTMENT TOPICAL 2 TIMES DAILY
Status: DISCONTINUED | OUTPATIENT
Start: 2018-01-19 | End: 2018-01-19

## 2018-01-19 RX ORDER — POTASSIUM CHLORIDE 750 MG/1
40 CAPSULE, EXTENDED RELEASE ORAL ONCE
Status: COMPLETED | OUTPATIENT
Start: 2018-01-19 | End: 2018-01-19

## 2018-01-19 RX ORDER — POTASSIUM CHLORIDE 20 MEQ/1
20 TABLET, EXTENDED RELEASE ORAL 2 TIMES DAILY
Status: DISCONTINUED | OUTPATIENT
Start: 2018-01-19 | End: 2018-01-20

## 2018-01-19 RX ORDER — GLUCAGON 1 MG
1 KIT INJECTION
Status: DISCONTINUED | OUTPATIENT
Start: 2018-01-19 | End: 2018-01-21

## 2018-01-19 RX ORDER — METOPROLOL TARTRATE 25 MG/1
12.5 TABLET ORAL 2 TIMES DAILY
Status: DISCONTINUED | OUTPATIENT
Start: 2018-01-19 | End: 2018-01-20

## 2018-01-19 RX ORDER — OXYCODONE HYDROCHLORIDE 5 MG/1
10 TABLET ORAL EVERY 4 HOURS PRN
Status: DISCONTINUED | OUTPATIENT
Start: 2018-01-19 | End: 2018-01-20

## 2018-01-19 RX ORDER — CLONAZEPAM 0.5 MG/1
0.5 TABLET ORAL 2 TIMES DAILY
Status: DISCONTINUED | OUTPATIENT
Start: 2018-01-19 | End: 2018-01-20

## 2018-01-19 RX ORDER — IBUPROFEN 200 MG
24 TABLET ORAL
Status: DISCONTINUED | OUTPATIENT
Start: 2018-01-19 | End: 2018-01-21

## 2018-01-19 RX ORDER — INSULIN ASPART 100 [IU]/ML
1-10 INJECTION, SOLUTION INTRAVENOUS; SUBCUTANEOUS
Status: DISCONTINUED | OUTPATIENT
Start: 2018-01-19 | End: 2018-01-21

## 2018-01-19 RX ORDER — IBUPROFEN 200 MG
16 TABLET ORAL
Status: DISCONTINUED | OUTPATIENT
Start: 2018-01-19 | End: 2018-01-21

## 2018-01-19 RX ORDER — HYDRALAZINE HYDROCHLORIDE 20 MG/ML
10 INJECTION INTRAMUSCULAR; INTRAVENOUS EVERY 6 HOURS PRN
Status: DISCONTINUED | OUTPATIENT
Start: 2018-01-19 | End: 2018-01-19

## 2018-01-19 RX ADMIN — Medication 3 ML: at 02:01

## 2018-01-19 RX ADMIN — OXYCODONE HYDROCHLORIDE 10 MG: 5 TABLET ORAL at 04:01

## 2018-01-19 RX ADMIN — ASPIRIN 325 MG ORAL TABLET 325 MG: 325 PILL ORAL at 08:01

## 2018-01-19 RX ADMIN — FUROSEMIDE 40 MG: 10 INJECTION, SOLUTION INTRAMUSCULAR; INTRAVENOUS at 05:01

## 2018-01-19 RX ADMIN — MUPIROCIN 1 G: 20 OINTMENT TOPICAL at 08:01

## 2018-01-19 RX ADMIN — OXYCODONE HYDROCHLORIDE 10 MG: 5 TABLET ORAL at 08:01

## 2018-01-19 RX ADMIN — OXYCODONE HYDROCHLORIDE 5 MG: 5 TABLET ORAL at 05:01

## 2018-01-19 RX ADMIN — FUROSEMIDE 40 MG: 10 INJECTION, SOLUTION INTRAMUSCULAR; INTRAVENOUS at 08:01

## 2018-01-19 RX ADMIN — DOCUSATE SODIUM 100 MG: 50 CAPSULE, LIQUID FILLED ORAL at 08:01

## 2018-01-19 RX ADMIN — OXYCODONE HYDROCHLORIDE 5 MG: 5 TABLET ORAL at 01:01

## 2018-01-19 RX ADMIN — CEFAZOLIN 2 G: 330 INJECTION, POWDER, FOR SOLUTION INTRAMUSCULAR; INTRAVENOUS at 06:01

## 2018-01-19 RX ADMIN — ATORVASTATIN CALCIUM 40 MG: 20 TABLET, FILM COATED ORAL at 08:01

## 2018-01-19 RX ADMIN — CEFAZOLIN 2 G: 330 INJECTION, POWDER, FOR SOLUTION INTRAMUSCULAR; INTRAVENOUS at 02:01

## 2018-01-19 RX ADMIN — POTASSIUM CHLORIDE 40 MEQ: 750 CAPSULE, EXTENDED RELEASE ORAL at 08:01

## 2018-01-19 RX ADMIN — Medication 12.5 MG: at 08:01

## 2018-01-19 RX ADMIN — IPRATROPIUM BROMIDE AND ALBUTEROL SULFATE 3 ML: .5; 3 SOLUTION RESPIRATORY (INHALATION) at 04:01

## 2018-01-19 RX ADMIN — CLONAZEPAM 0.5 MG: 0.5 TABLET ORAL at 11:01

## 2018-01-19 RX ADMIN — DEXTROSE 2 G: 50 INJECTION, SOLUTION INTRAVENOUS at 10:01

## 2018-01-19 RX ADMIN — POLYETHYLENE GLYCOL 3350 17 G: 17 POWDER, FOR SOLUTION ORAL at 08:01

## 2018-01-19 RX ADMIN — IPRATROPIUM BROMIDE AND ALBUTEROL SULFATE 3 ML: .5; 3 SOLUTION RESPIRATORY (INHALATION) at 08:01

## 2018-01-19 RX ADMIN — CLONAZEPAM 0.5 MG: 0.5 TABLET ORAL at 08:01

## 2018-01-19 RX ADMIN — OXYCODONE HYDROCHLORIDE 10 MG: 5 TABLET ORAL at 09:01

## 2018-01-19 RX ADMIN — Medication 3 ML: at 06:01

## 2018-01-19 RX ADMIN — PANTOPRAZOLE SODIUM 40 MG: 40 INJECTION, POWDER, FOR SOLUTION INTRAVENOUS at 08:01

## 2018-01-19 RX ADMIN — HYDRALAZINE HYDROCHLORIDE 10 MG: 20 INJECTION INTRAMUSCULAR; INTRAVENOUS at 04:01

## 2018-01-19 RX ADMIN — POTASSIUM CHLORIDE 20 MEQ: 1500 TABLET, EXTENDED RELEASE ORAL at 08:01

## 2018-01-19 RX ADMIN — IPRATROPIUM BROMIDE AND ALBUTEROL SULFATE 3 ML: .5; 3 SOLUTION RESPIRATORY (INHALATION) at 12:01

## 2018-01-19 NOTE — ASSESSMENT & PLAN NOTE
Neuro:   - pain mgmt: oxycodone 5 and 10     Pulmonary:   - Wean supplemental (2L) as tolerted  - Daily CXRs  - ABGs PRN     Cardiac:   - hydralazine 10 mg q6h PRN, MAP >80  - MAP goal 60-80  - Monitor and maintain mediastinal chest tube  -   - metoprolol 12.5 BID    Renal:    - Monitor UOP, maintain montague  - net +3.2L  - Trend BUN/Cr; stable 9/.8  - lasix 40 mg IVP BID     ID:   - Trend WBC daily; no leukcytosis  - 24 post-op ancef    Hem/Onc:   - Monitor hgb; trend 8.1/ 25.4 (10.3/ 32.2 post-op), re-check at 1400  - product received: none     Endocrine:    - BG control per endocrine      Fluids/Electrolytes/Nutrition/GI:   - Replace electrolytes PRN per protocol  - CLD, advance to cardiac as tolerated.     DISPO:  - step down to CTSU

## 2018-01-19 NOTE — OP NOTE
DATE OF PROCEDURE:  01/18/2018    ATTENDING SURGEON:  Randall Sorensen M.D.     ASSISTANT:  rByan Juarez.    PREOPERATIVE DIAGNOSES:  1.  Severe mitral regurgitation.  2.  Pulmonary hypertension.  3.  Anxiety.  4.  Active smoking.    POSTOPERATIVE DIAGNOSES:  1.  Severe mitral regurgitation.  2.  Pulmonary hypertension.  3.  Anxiety.  4.  Active smoking.    OPERATIONS PERFORMED:  Mitral valve repair with quadrangular resection of the P2   scallop of the posterior leaflet, sliding plasty and a 29 mm Medtronic Simulus   band annuloplasty.    ANESTHESIA:  General endotracheal.    ESTIMATED BLOOD LOSS:  100 mL.    BRIEF HISTORY:  Ms. Carrera is a 47-year-old woman who initially presented   with shortness of breath with activities of daily living, fatigue, chest pain   and palpitations.  She has had a murmur for many years, but over the previous 6   months, she developed a progressive worsening of her symptoms.  She underwent a   thoughtful and thorough evaluation, which included echocardiography.  This study   demonstrated severe mitral regurgitation.  She now presents for mitral valve   repair.    PROCEDURE IN DETAIL:  After obtaining informed and written consent, the patient   was brought to the operating room, placed on the operating table in supine   position.  After induction of adequate general endotracheal anesthesia, the   patient's chest, abdomen, pelvis and bilateral lower extremities were prepped   and draped in the usual sterile fashion.  An upper midline skin incision was   made, and a median sternotomy was performed.  A pericardial well was created.    The patient was systemically heparinized.  Cannulation sutures were placed in   the aorta and in the superior vena cava.  The aortic cannula was inserted,   followed by the venous.  An IVC cannula was also placed.  Antegrade and   retrograde cardioplegia catheters were placed.  The patient was then placed on   cardiopulmonary bypass.  Once on  bypass, circumferential control was obtained   over the superior vena cava.  The aortic cross-clamp was applied and the heart   was arrested using cold blood enhanced antegrade cardioplegia.  A prompt   electromechanical arrest was achieved.  Once the cardioplegia was all in, the   cannulas were repositioned.  A left atriotomy was made near the right-sided   pulmonary veins.  The Bonnie retractor was placed for exposure.  The mitral   valve was evaluated.  She had what appeared to be most consistent with a Cha   valve.  Although significant mitral regurgitation had been seen on JAH   intraoperatively, there were no flail or prolapsing segments.  The valve itself   demonstrated mitral regurgitation all along the line of coaptation.  The   leaflets themselves were somewhat thickened and redundant, consistent with a   Cha valve.  Multiple nonpledgeted 2-0 Ethibond sutures were placed along the   posterior annulus from the medial trigone to the lateral trigone.  The annulus   was sized and a 29 mm band was selected.  The band was brought up.  The sutures   were passed through it and it was slid into position.  It seated nicely.  The   sutures were tied and then cut.  The valve was then tested.  No mitral   regurgitation was seen.  The left atriotomy was then closed in a single layer   using running 4-0 Prolene suture.  Prior to closing the left atrium, de-airing   maneuvers were performed.  Once the left atrium was closed, the hot shot was   given retrograde.  Additional de-airing maneuvers were performed and the aortic   cross-clamp was removed.  The patient was subsequently weaned from   cardiopulmonary bypass.  The patient did separate easily from bypass.  Once off   bypass, all surgical sites were inspected.  There was good hemostasis.  The   valve was evaluated using JAH.  There was significant systolic anterior motion   of the valve.  Despite volume loading and removal from inotropes, she   demonstrated  significant left ventricular outflow tract obstruction by the   anterior leaflet and resultant mitral regurgitation.  The patient was placed   back on cardiopulmonary bypass.  The aortic cross-clamp was applied and the   heart was arrested using cold blood-enhanced antegrade cardioplegia.  A prompt   electromechanical arrest was achieved.  Once the cardioplegia was all in, the   cannulas were again repositioned.  The left atriotomy suture was removed and the   Bonnie retractor was replaced.  The previously placed band was carefully   removed.  I felt that given the diameter of her left ventricular outflow tract,   the angle between her mitral annulus and her aortic annulus, and the redundant   nature of her leaflets that the best course of action was posterior leaflet   resection in order to move to coaptation point posteriorly.  Silk sutures were   placed around the cords at the cleft between the P1 and P2 scallop and the P2   and P3 scallop.  A quadrangular resection of the P2 scallop was then performed.    P1 and P3 were partially detached from the annulus.  Multiple nonpledgeted 2-0   Ethibond sutures were then placed along the posterior annulus from the medial   trigone to the lateral trigone.  The sliding plasty was then performed using 4-0   Prolene suture to reapproximate the leaflets and the annulus.  The posterior   leaflet itself was reconstructed using a running 5-0 Ethibond suture.  The valve   was tested and no mitral regurgitation was seen.  The annulus was sized, and   again a 29 mm band was selected.  A new band was used as the old band could not   be placed back on to a muñoz.  The band was brought up, the annuloplasty   sutures were passed through it and it was slid into position.  It seated nicely.    The sutures were tied and then cut.  The valve was again tested and no mitral   regurgitation was seen.  The left atriotomy was then closed using a running 4-0   Prolene suture.  Prior to closing  the left atrium, de-airing maneuvers were   performed.  Once the left atrium was closed, the hot shot was given retrograde.    Additional de-airing maneuvers were performed and the aortic cross-clamp was   removed.  The patient was subsequently weaned from cardiopulmonary bypass.  The   patient did separate easily from bypass.  Once off bypass, all surgical sites   were inspected.  There was good hemostasis.  The valve was evaluated using JAH.    There was no mitral regurgitation seen.  There was no systolic anterior motion.    The flow through the left ventricular outflow tract was laminar.  The test   dose of protamine was administered and this was well tolerated.  The total dose   was then given.  alf through the total dose, all cannulas were removed.  All   surgical sites were again inspected and again there was good hemostasis.    Ventricular pacing wires were placed.  Drains were placed.  After again   confirming adequate hemostasis, the patient's chest was closed using #6   stainless steel wires to reapproximate the sternum.  The overlying soft tissues   were reapproximated using absorbable suture material.  The patient's chest was   washed and dried and a dry dressing was applied.  The patient tolerated the   procedure well and there were no complications.  At the conclusion of the case,   sponge and instrument counts were correct.      AQUILINO/JORDY  dd: 01/19/2018 15:13:49 (CST)  td: 01/19/2018 16:32:33 (CST)  Doc ID   #[id number]  Job ID #[id number]    CC:

## 2018-01-19 NOTE — PLAN OF CARE
Problem: Patient Care Overview  Goal: Plan of Care Review  Outcome: Ongoing (interventions implemented as appropriate)  Recommendations     1. When medically feasible, ADAT to cardiac.   2. Add Boost Plus ONS if PO intake consisently <50%.   3. RD to monitor & follow-up.

## 2018-01-19 NOTE — HPI
Reason for Consult: Management of Stress Hyperglycemia     Surgical Procedure and Date: s/p MVR on 1/18/18    HPI:   Patient is a 47 y.o. female with no prior diagnosis of diabetes.   Pt has a PMHx of current smoker, HTN and severe MR with MV prolapse. She is now s/p MVR.  Endocrinology consulted for optimal BG management.

## 2018-01-19 NOTE — SUBJECTIVE & OBJECTIVE
Interval HPI:   On CTS protocol at 0.3 u/hr  Intubated, NPO.    PMH, PSH, FH, SH updated and reviewed       Review of Systems   ROS:  Unable to obtain due to:     Current Medications and/or Treatments Impacting Glycemic Control  Immunotherapy:    Immunosuppressants     None        Steroids:   Hormones     None        Pressors:    Autonomic Drugs     Start     Stop Route Frequency Ordered    01/19/18 0730  metoprolol tartrate (LOPRESSOR) split tablet 12.5 mg      -- Oral 2 times daily 01/19/18 0652    01/18/18 1530  epinephrine 4 mg in sodium chloride 0.9% 250 mL infusion     Question Answer Comment   Titrate by: (in mcg/kg/min) 0.02    Titrate interval: (in minutes) 5    Titrate to maintain: (SBP or MAP or Cardiac Index) MAP    Greater than: (in mmHg) 65    Cardiac index greater than: (in L/min) 2.2    Maximum dose: (in mcg/kg/min) 2        -- IV Continuous 01/18/18 1529        Hyperglycemia/Diabetes Medications:   Antihyperglycemics     Start     Stop Route Frequency Ordered    01/18/18 1530  insulin regular (Humulin R) 100 Units in sodium chloride 0.9% 100 mL infusion      -- IV Continuous 01/18/18 1529             PHYSICAL EXAMINATION:  Vitals:    01/19/18 1115   BP:    Pulse:    Resp:    Temp: 98.1 °F (36.7 °C)     Body mass index is 33.22 kg/m².    Physical Exam     Constitutional:  Well developed, well nourished, NAD.  ENT: External ears no masses with nose patent; normal hearing.   Neck:  Supple; trachea midline; no thyromegaly.   Cardiovascular: Normal heart sounds, no LE edema.     Lungs:  Normal effort; lungs anterior bilaterally clear to auscultation.  Abdomen:  Soft, no masses,  no hernias.  MS: No clubbing or cyanosis of nails noted; normal gait or unable to assess gait.  Skin: No rashes, lesions, or ulcers; no nodules.  Psychiatric: Good judgement and insight; normal mood and affect.  Neurological: Cranial nerves are grossly intact. Normal vibration sense in the bilateral lower extremities.

## 2018-01-19 NOTE — PROGRESS NOTES
[facility]  Critical Care - Surgery  Progress Note    Patient Name: Alondra Carrera  MRN: [id number]  Admission Date: 1/18/2018  Hospital Length of Stay: 1 days  Code Status: Full Code  Attending Provider: Randall Sorensen MD  Primary Care Provider: Nita De La Cruz MD   Principal Problem: Mitral valve insufficiency    Subjective:     Interval History/Significant Events:      No acute events overnight. Albumin 500 5% x2 for low CVP and metabolic acidosis. Adequate uop, 2.6L/24h. Renal function stable.     Follow-up For: Procedure(s) (LRB):  Mitral Valve Repair (N/A)    Post-Operative Day: 1 Day Post-Op    Objective:     Vital Signs (Most Recent):  Temp: 98.3 °F (36.8 °C) (01/19/18 0800)  Pulse: 86 (01/19/18 0801)  Resp: 14 (01/19/18 0801)  BP: 99/65 (01/19/18 0800)  SpO2: 99 % (01/19/18 0801) Vital Signs (24h Range):  Temp:  [98 °F (36.7 °C)-99.1 °F (37.3 °C)] 98.3 °F (36.8 °C)  Pulse:  [84-98] 86  Resp:  [14-40] 14  SpO2:  [97 %-100 %] 99 %  BP: ()/(60-73) 99/65  Arterial Line BP: ()/(54-75) 100/55     Weight: 96.2 kg (212 lb 1.3 oz)  Body mass index is 33.22 kg/m².      Intake/Output Summary (Last 24 hours) at 01/19/18 0905  Last data filed at 01/19/18 0800   Gross per 24 hour   Intake             5659 ml   Output             2892 ml   Net             2767 ml       Physical Exam   Constitutional: She is oriented to person, place, and time. She appears well-developed and well-nourished. No distress.   HENT:   Head: Normocephalic and atraumatic.   Eyes: No scleral icterus.   Cardiovascular: Normal rate and regular rhythm.    Sternal incision appropriately tender, cdi, CT x 1 meds SS, 202   Pulmonary/Chest: Effort normal. No stridor. No respiratory distress.   Abdominal: Soft. She exhibits no distension.   Genitourinary:   Genitourinary Comments: Johnston in place   Neurological: She is alert and oriented to person, place, and time.   Skin: Skin is warm. Capillary refill takes less than 2  seconds.   Psychiatric: She has a normal mood and affect.       Vents:  Oxygen Concentration (%): 28 (01/19/18 0055)    Lines/Drains/Airways     Central Venous Catheter Line                 Introducer 01/18/18 0720 right internal jugular 1 day         Percutaneous Central Line Insertion/Assessment - triple lumen  01/18/18 1540 right internal jugular less than 1 day          Drain                 Urethral Catheter 01/18/18 0748 Temperature probe 16 Fr. 1 day         Y Chest Tube 1 and 2 01/18/18 1328 1 Mediastinal 19 Fr. 2 Mediastinal 19 Fr. less than 1 day          Epidural Line                 Perineural Analgesia/Anesthesia Assessment (using dermatomes) Epidural 01/18/18 0801 1 day          Arterial Line                 Arterial Line 01/18/18 0711 Left Radial 1 day          Line                 Pacer Wires 01/18/18 1322 less than 1 day          Peripheral Intravenous Line                 Peripheral IV - Single Lumen 01/18/18 0600 Left Hand 1 day         Peripheral IV - Single Lumen 01/18/18 0730 Right Hand 1 day                Significant Labs:    CBC/Anemia Profile:    Recent Labs  Lab 01/18/18  1534 01/18/18  1543 01/19/18  0240   WBC 16.98*  --  10.13   HGB 10.3*  --  8.1*   HCT 32.2* 29* 25.4*   PLT 98*  --  82*   MCV 85  --  84   RDW 13.6  --  13.8        Chemistries:    Recent Labs  Lab 01/18/18  1805 01/18/18 2011 01/19/18  0240     --  144   K 4.3 4.0 3.7   *  --  116*   CO2 SEE COMMENT  --  22*   BUN 7  --  9   CREATININE 0.8  --  0.8   CALCIUM 8.0*  --  8.3*   MG 2.6  --  2.3   PHOS 2.6*  --  2.5*       ABGs:   Recent Labs  Lab 01/19/18  0039   PH 7.351   PCO2 38.9   HCO3 21.5*   POCSATURATED 99   BE -4     CMP:   Recent Labs  Lab 01/18/18  1805 01/18/18 2011 01/19/18  0240     --  144   K 4.3 4.0 3.7   *  --  116*   CO2 SEE COMMENT  --  22*   *  --  118*   BUN 7  --  9   CREATININE 0.8  --  0.8   CALCIUM 8.0*  --  8.3*   ANIONGAP SEE COMMENT  --  6*   EGFRNONAA >60.0  --   >60.0     Cardiac Markers: No results for input(s): CKMB, TROPONINT, MYOGLOBIN in the last 48 hours.     Coagulation:   Recent Labs  Lab 01/19/18  0240   INR 1.2   APTT 29.6     Lactic Acid: No results for input(s): LACTATE in the last 48 hours.  Prealbumin: No results for input(s): PREALBUMIN in the last 48 hours.    Significant Imaging:  I have reviewed all pertinent imaging results/findings within the past 24 hours.    Assessment/Plan:     * Mitral valve insufficiency    Alondra Carrera is a 47 y.o. female with a pmhx of severe MR with MVP who is now s/p MVR.    Neuro:  - AAOx3  - pain control: per CTS    Cardiac:  - Severe MR s/p MVR: doing well post procedure  - off cardene  -   - statin therapy    Respiratory:  - on NC, wean oxygen as tolerated  - f/u CXR on POD1  - PRN ABG    Renal:  - montague in place  - monitor UOP  - monitor Cr/ BUN    Endocrine:  - hyperglycemia: wean insulin gtt as tolerated  - q1hr glucose  - endocrine consulted    ID:  - continue post-op abx x5 doses  - will follow WBC    Heme:  - CBC and INR ordered will follow up  - transfuse if needed    FEN/GI:  - MIVF  - NPO advance per CTS   - replace lytes as needed  - protonix ordered    Dispo:  -Continue care in SICU, CTS primary            Sebastian Rizvi MD  Critical Care - Surgery  [facility]

## 2018-01-19 NOTE — PLAN OF CARE
Problem: Occupational Therapy Goal  Goal: Occupational Therapy Goal  Goals to be met by: 1/29/18     Patient will increase functional independence with ADLs by performing:    Feeding with [place].  UE Dressing with Supervision.  LE Dressing with Supervision.  Grooming while standing at sink with Supervision.  Toileting from toilet with Supervision for hygiene and clothing management.   Toilet transfer to toilet with Supervision.    Outcome: Ongoing (interventions implemented as appropriate)  OT eval completed, and above goals established. ARIANA Almendarez  1/19/2018

## 2018-01-19 NOTE — CONSULTS
[facility]  Endocrinology  Diabetes Consult Note    Consult Requested by: Randall Sorensen MD   Reason for admit: Mitral valve insufficiency    HISTORY OF PRESENT ILLNESS:  Reason for Consult: Management of Stress Hyperglycemia     Surgical Procedure and Date: s/p MVR on 1/18/18    HPI:   Patient is a 47 y.o. female with no prior diagnosis of diabetes.   Pt has a PMHx of current smoker, HTN and severe MR with MV prolapse. She is now s/p MVR.  Endocrinology consulted for optimal BG management.        Interval HPI:   On CTS protocol at 0.3 u/hr  Extubated, advanced to clears today.      PMH, PSH, FH, SH updated and reviewed       Review of Systems   Constitutional: Negative for weight changes.  Eyes: Negative for visual disturbance.  Respiratory: Negative for cough.   Cardiovascular: Negative for chest pain.  Gastrointestinal: Negative for nausea.  Endocrine: Negative for polyuria, polydipsia.  Musculoskeletal: Negative for back pain.  Skin: Negative for rash.  Neurological: Negative for syncope.  Psychiatric/Behavioral: Negative for depression.    Current Medications and/or Treatments Impacting Glycemic Control  Immunotherapy:    Immunosuppressants     None        Steroids:   Hormones     None        Pressors:    Autonomic Drugs     Start     Stop Route Frequency Ordered    01/19/18 0730  metoprolol tartrate (LOPRESSOR) split tablet 12.5 mg      -- Oral 2 times daily 01/19/18 0652    01/18/18 1530  epinephrine 4 mg in sodium chloride 0.9% 250 mL infusion     Question Answer Comment   Titrate by: (in mcg/kg/min) 0.02    Titrate interval: (in minutes) 5    Titrate to maintain: (SBP or MAP or Cardiac Index) MAP    Greater than: (in mmHg) 65    Cardiac index greater than: (in L/min) 2.2    Maximum dose: (in mcg/kg/min) 2        -- IV Continuous 01/18/18 1529        Hyperglycemia/Diabetes Medications:   Antihyperglycemics     Start     Stop Route Frequency Ordered    01/18/18 1530  insulin regular  (Humulin R) 100 Units in sodium chloride 0.9% 100 mL infusion      -- IV Continuous 01/18/18 1529             PHYSICAL EXAMINATION:  Vitals:    01/19/18 1115   BP:    Pulse:    Resp:    Temp: 98.1 °F (36.7 °C)     Body mass index is 33.22 kg/m².    Physical Exam   Constitutional:  Well developed, well nourished, NAD.  ENT: External ears no masses with nose patent; normal hearing.   Neck:  Supple; trachea midline; no thyromegaly.   Cardiovascular: Normal heart sounds, no LE edema.     Lungs:  Normal effort; lungs anterior bilaterally clear to auscultation, decreased at bases.  Abdomen:  Soft, no masses  MS: No clubbing or cyanosis of nails noted; unable to assess gait.  Skin: No rashes, lesions, or ulcers; no nodules.  Psychiatric: Good judgement and insight; normal mood and affect.  Neurological: Cranial nerves II to XII are grossly intact.        Labs Reviewed and Include     Recent Labs  Lab 01/19/18  0240   *   CALCIUM 8.3*      K 3.7   CO2 22*   *   BUN 9   CREATININE 0.8     Lab Results   Component Value Date    WBC 10.13 01/19/2018    HGB 8.1 (L) 01/19/2018    HCT 25.4 (L) 01/19/2018    MCV 84 01/19/2018    PLT 82 (L) 01/19/2018     No results for input(s): TSH, FREET4 in the last 168 hours.  Lab Results   Component Value Date    HGBA1C 4.9 01/16/2018       Nutritional status:   Body mass index is 33.22 kg/m².  Lab Results   Component Value Date    ALBUMIN 3.5 01/16/2018    ALBUMIN 4.4 02/18/2011     No results found for: PREALBUMIN    Estimated Creatinine Clearance: 103.5 mL/min (based on SCr of 0.8 mg/dL).    Accu-Checks  Recent Labs      01/18/18   1531  01/18/18   1604  01/18/18   1657  01/18/18   1806  01/18/18   1937  01/18/18 2010 01/18/18   2103  01/18/18   2201  01/18/18   2302  01/19/18   0031   POCTGLUCOSE  117*  156*  142*  185*  119*  130*  122*  124*  129*  134*        ASSESSMENT and PLAN    * Mitral valve insufficiency      S/p mvr, managed per cards.        Stress  hyperglycemia    BG goal at 110-140 mg/dl  Continue CTS protocol, change bg monitoring to q2h-d/c cts, start bg monitoring ac/hs, moderate dose correction scale    Discharge planning: anticipate nice resolution              Plan discussed with patient, family, and RN at bedside.     TY Mercado, FNP  Endocrinology  [facility]

## 2018-01-19 NOTE — NURSING TRANSFER
Nursing Transfer Note      1/19/2018     Transfer To: 304 from [id number]    Transfer via wheelchair    Transfer with 2L NC to O2, cardiac monitoring    Transported by RN, PCT    Chart send with patient: Yes    Notified: spouse    Patient reassessed at: 1500 (date, time)    Upon arrival to floor: cardiac monitor applied, pt oriented to room, cardiac monitoring maintained

## 2018-01-19 NOTE — PROGRESS NOTES
[facility]  Cardiothoracic Surgery  Progress Note    Patient Name: Alondra Carrera  MRN: [id number]  Admission Date: 1/18/2018  Hospital Length of Stay: 1 days  Code Status: Full Code   Attending Physician: Randall Sorensen MD   Referring Provider: Randall Sorensen MD  Principal Problem:Mitral valve insufficiency    Subjective:     Post-Op Info:  Procedure(s) (LRB):  Mitral Valve Repair (N/A)   1 Day Post-Op     Interval History: POD1 MVr. No acute events overnight. Albumin 500 5% x2 for low CVP and metabolic acidosis post-op, corrected. Adequate uop, 2.6L/24h. Renal function stable.     Medications:  Continuous Infusions:   dextrose 5 % and 0.45 % NaCl with KCl 20 mEq 5 mL/hr at 01/19/18 0600    epinephrine      insulin (HUMAN R) infusion (adults) 0.3 Units/hr (01/19/18 0600)    nicardipine Stopped (01/18/18 2200)    propofol       Scheduled Meds:   albuterol-ipratropium 2.5mg-0.5mg/3mL  3 mL Nebulization Q4H    aspirin  325 mg Oral Daily    atorvastatin  40 mg Oral QHS    ceFAZolin (ANCEF) IVPB  2 g Intravenous Q8H    docusate sodium  100 mg Oral BID    furosemide  40 mg Intravenous BID    metoprolol tartrate  12.5 mg Oral BID    mupirocin  1 g Nasal BID    pantoprazole  40 mg Intravenous Daily    polyethylene glycol  17 g Oral Daily    potassium chloride  40 mEq Oral Once    sodium chloride 0.9%  3 mL Intravenous Q8H     PRN Meds:acetaminophen, albuterol-ipratropium 2.5mg-0.5mg/3mL, bisacodyl, dextrose 50%, dextrose 50%, fentaNYL, hydrALAZINE, magnesium sulfate IVPB, magnesium sulfate IVPB, metoclopramide HCl, ondansetron, oxyCODONE, oxyCODONE     Objective:     Vital Signs (Most Recent):  Temp: 98.9 °F (37.2 °C) (01/19/18 0300)  Pulse: 90 (01/19/18 0645)  Resp: (!) 26 (01/19/18 0645)  BP: 106/64 (01/19/18 0500)  SpO2: 100 % (01/19/18 0645) Vital Signs (24h Range):  Temp:  [98 °F (36.7 °C)-99.1 °F (37.3 °C)] 98.9 °F (37.2 °C)  Pulse:  [84-98] 90  Resp:  [17-40] 26  SpO2:   [97 %-100 %] 100 %  BP: ()/(60-73) 106/64  Arterial Line BP: ()/(54-75) 99/54     Weight: 96.2 kg (212 lb 1.3 oz)  Body mass index is 33.22 kg/m².    SpO2: 100 %  O2 Device (Oxygen Therapy): nasal cannula    Intake/Output - Last 3 Shifts       01/17 0700 - 01/18 0659 01/18 0700 - 01/19 0659 01/19 0700 - 01/20 0659    P.O.  500     I.V. (mL/kg)  3973 (41.3)     Blood  1708     Other  178     IV Piggyback  100     Total Intake(mL/kg)  6459 (67.1)     Urine (mL/kg/hr)  2620 (1.1)     Chest Tube  202 (0.1)     Total Output   2822      Net   +3637                   Lines/Drains/Airways     Central Venous Catheter Line                 Introducer 01/18/18 0720 right internal jugular 1 day         Percutaneous Central Line Insertion/Assessment - triple lumen  01/18/18 1540 right internal jugular less than 1 day          Drain                 Urethral Catheter 01/18/18 0748 Temperature probe 16 Fr. 1 day         Y Chest Tube 1 and 2 01/18/18 1328 1 Mediastinal 19 Fr. 2 Mediastinal 19 Fr. less than 1 day          Epidural Line                 Perineural Analgesia/Anesthesia Assessment (using dermatomes) Epidural 01/18/18 0801 less than 1 day          Arterial Line                 Arterial Line 01/18/18 0711 Left Radial 1 day          Line                 Pacer Wires 01/18/18 1322 less than 1 day          Peripheral Intravenous Line                 Peripheral IV - Single Lumen 01/18/18 0600 Left Hand 1 day         Peripheral IV - Single Lumen 01/18/18 0730 Right Hand 1 day                Physical Exam   Constitutional: She is oriented to person, place, and time. She appears well-developed and well-nourished. No distress.   HENT:   Head: Normocephalic and atraumatic.   Eyes: No scleral icterus.   Cardiovascular: Normal rate and regular rhythm.    Sternal incision appropriately tender, cdi, CT x 1 meds SS, 202   Pulmonary/Chest: Effort normal. No stridor. No respiratory distress.   Abdominal: Soft. She exhibits no  distension.   Genitourinary:   Genitourinary Comments: Montague in place   Neurological: She is alert and oriented to person, place, and time.   Skin: Skin is warm. Capillary refill takes less than 2 seconds.   Psychiatric: She has a normal mood and affect.       Significant Labs:  ABGs:   Recent Labs  Lab 01/19/18  0039   PH 7.351   PCO2 38.9   PO2 125*   HCO3 21.5*   POCSATURATED 99   BE -4     CBC:   Recent Labs  Lab 01/19/18  0240   WBC 10.13   RBC 3.03*   HGB 8.1*   HCT 25.4*   PLT 82*   MCV 84   MCH 26.7*   MCHC 31.9*     CMP:   Recent Labs  Lab 01/19/18  0240   *   CALCIUM 8.3*      K 3.7   CO2 22*   *   BUN 9   CREATININE 0.8     Coagulation:   Recent Labs  Lab 01/19/18  0240   INR 1.2   APTT 29.6     Lactic Acid: No results for input(s): LACTATE in the last 48 hours.  All pertinent labs from the last 24 hours have been reviewed.    Significant Diagnostics:  I have reviewed and interpreted all pertinent imaging results/findings within the past 24 hours. clear, minimal L- effusion     Assessment/Plan:     * Mitral valve insufficiency    Neuro:   - pain mgmt: oxycodone 5 and 10     Pulmonary:   - Wean supplemental (2L) as tolerted  - Daily CXRs  - ABGs PRN     Cardiac:   - hydralazine 10 mg q6h PRN, MAP >80  - MAP goal 60-80  - Monitor and maintain mediastinal chest tube  -   - metoprolol 12.5 BID    Renal:    - Monitor UOP, maintain montague  - net +3.2L  - Trend BUN/Cr; stable 9/.8  - lasix 40 mg IVP BID     ID:   - Trend WBC daily; no leukcytosis  - 24 post-op ancef    Hem/Onc:   - Monitor hgb; trend 8.1/ 25.4 (10.3/ 32.2 post-op), re-check at 1400  - product received: none     Endocrine:    - BG control per endocrine      Fluids/Electrolytes/Nutrition/GI:   - Replace electrolytes PRN per protocol  - CLD, advance to cardiac as tolerated.     DISPO:  - step down to CTSU            Ava Tirado MD  Cardiothoracic Surgery  [facility]

## 2018-01-19 NOTE — CONSULTS
"  [facility]  Adult Nutrition  Consult Note    SUMMARY     Recommendations    1. When medically feasible, ADAT to cardiac.   2. Add Boost Plus ONS if PO intake consisently <50%.   3. RD to monitor & follow-up.    Goals: Meet % EEN, EPN  Nutrition Goal Status: new  Communication of RD Recs: reviewed with RN    Reason for Assessment    Reason for Assessment: physician consult  Diagnosis: other (see comments) (S/p MVR)  Relevent Medical History: HTN   Interdisciplinary Rounds: did not attend     General Information Comments: S/p MVR. Diet advanced this AM to clear liquid. Pt tolerating thus far.  Nutrition Discharge Planning: Adequate PO intake    Nutrition Prescription Ordered    Current Diet Order: Clear liquid    Evaluation of Received Nutrients/Fluid Intake    IV Fluid (mL): 120    Nutrition/Diet History    Patient Reported Diet/Restrictions/Preferences: general     Factors Affecting Nutritional Intake: other (see comments) (Clear liquid diet)    Labs/Tests/Procedures/Meds    Pertinent Labs Reviewed: reviewed, pertinent  Pertinent Labs Comments: Stable  Pertinent Medications Reviewed: reviewed, pertinent  Pertinent Medications Comments: Statin, D5, Insulin    Physical Findings    Overall Physical Appearance: overweight  Oral/Mouth Cavity: WDL  Skin: incision (Chest)    Anthropometrics    Temp: 98.1 °F (36.7 °C)     Height: 5' 7" (170.2 cm)  Weight Method: Bed Scale  Weight: 96.2 kg (212 lb 1.3 oz)     Ideal Body Weight (IBW), Female: 135 lb  % Ideal Body Weight, Female (lb): 157.1 lb     BMI (Calculated): 33.3  BMI Grade: 30 - 34.9- obesity - grade I    Estimated/Assessed Needs    Weight Used For Calorie Calculations: 96.2 kg (212 lb 1.3 oz)      Energy Calorie Requirements (kcal): 2036 kcal/d  Energy Need Method: [place] Jeor (1.25 PAL)     Weight Used For Protein Calculations: 96.2 kg (212 lb 1.3 oz)  Protein Requirements: 96 g/d (1 g/kg)     Fluid Need Method: RDA Method, other (see " comments) (Per MD or 1 mL/kcal)     Assessment and Plan    * S/P MVR (mitral valve repair)      Increased nutrient needs related to physiological causes as evidenced by s/p cardiac surgery.        Monitor and Evaluation    Food and Nutrient Intake: energy intake, food and beverage intake  Food and Nutrient Adminstration: diet order     Physical Activity and Function: nutrition-related ADLs and IADLs  Anthropometric Measurements: weight, weight change  Biochemical Data, Medical Tests and Procedures: lipid profile, inflammatory profile, glucose/endocrine profile, gastrointestinal profile, electrolyte and renal panel  Nutrition-Focused Physical Findings: overall appearance    Nutrition Risk    Level of Risk: other (see comments) (2x/week)    Nutrition Follow-Up    RD Follow-up?: Yes

## 2018-01-19 NOTE — PT/OT/SLP EVAL
Physical Therapy Evaluation    Patient Name:  Alondra Carrera   MRN:  [id number]    Recommendations:     Discharge Recommendations:   (no further PT will be needed)   Discharge Equipment Recommendations: none   Barriers to discharge: None    Assessment:     Alondra Carrera is a 47 y.o. female admitted with a medical diagnosis of Mitral valve insufficiency.  She presents with the following impairments/functional limitations:  impaired functional mobilty, gait instability, impaired endurance pt is s/p MVr 1/18/18. Pt jorge treatment well and will benefit from skilled PT 5x/wk to return pt to Independent status. Pt will be able to discharge home when medically stable and will not need further PT or DME.     Rehab Prognosis:  good; patient would benefit from acute skilled PT services to address these deficits and reach maximum level of function.      Recent Surgery: Procedure(s) (LRB):  Mitral Valve Repair (N/A) 1 Day Post-Op    Plan:     During this hospitalization, patient to be seen 5 x/week to address the above listed problems via gait training, therapeutic activities  · Plan of Care Expires:  02/17/18   Plan of Care Reviewed with: patient, spouse    Subjective     Communicated with nurse prior to session.  Patient found sitting in chair. upon PT entry to room, agreeable to evaluation.      Chief Complaint: pt c/o pain in chest during treatment.   Patient comments/goals:  To get better and go home.   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 Pt  works and pt is not sure if she will have assistance if needed. They live in 2 story with B&B upstairs and slab entrance.   Prior to admission, patients level of function was  Independent .  Patient has the following equipment: none.  DME owned (not  currently used): crutches.  Upon discharge, patient will have assistance from family after work and school. .    Objective:     Patient found with: arterial line, telemetry, blood pressure cuff, pulse ox (continuous), montague catheter, chest tube, oxygen, central line     General Precautions: Standard, fall, sternal   Orthopedic Precautions:    Braces:       Exams:  · Cognitive Exam:  Patient is oriented to Person, Place, Time and Situation   ·   · RLE ROM: WFL  · RLE Strength: WFL  · LLE ROM: WFL  · LLE Strength: WFL    Functional Mobility:  · Transfers:  Pt needed verbal cues for functional mobility with sternal precautions.    · Sit to Stand:  contact guard assistance with no AD  ·   · Gait: 24 ft with CGA. pt had O2 intact and stood at sink with CGA to brush teeth with OT. pt used bottled water for ADLS.    AM-PAC 6 CLICK MOBILITY  Total Score:16       Therapeutic Activities and Exercises:   pt and  received verbal and written instructions for sternal precautions and both verbally expressed understanding of such.     Patient left up in chair with all lines intact, call button in reach and  present.    GOALS:    Physical Therapy Goals        Problem: Physical Therapy Goal    Goal Priority Disciplines Outcome Goal Variances Interventions   Physical Therapy Goal     PT/OT, PT      Description:  Goals to be met by: 18    Patient will increase functional independence with mobility by performin. Supine to sit with Stand-by Assistance - not met  2. Sit to stand transfer with Supervision - not met  3. Gait  x 220 feet with Supervision - not met  4. Ascend/descend 12 stair with right Handrails Contact Guard Assistance - not met                      History:     Past Medical History:   Diagnosis Date    Genital herpes, unspecified     Hypertension     Mental disorder     Migraines        Past Surgical History:   Procedure Laterality Date    ECTOPIC PREGNANCY SURGERY      EXPLORATORY  LAPAROTOMY W/ BOWEL RESECTION      TUBAL LIGATION         Clinical Decision Making:     History  Co-morbidities and personal factors that may impact the plan of care Examination  Body Structures and Functions, activity limitations and participation restrictions that may impact the plan of care Clinical Presentation   Decision Making/ Complexity Score   Co-morbidities:   [] Time since onset of injury / illness / exacerbation  [] Status of current condition  []Patient's cognitive status and safety concerns    [] Multiple Medical Problems (see med hx)  Personal Factors:   [] Patient's age  [] Prior Level of function   [] Patient's home situation (environment and family support)  [] Patient's level of motivation  [] Expected progression of patient      HISTORY:(criteria)    [] 06782 - no personal factors/history    [] 01922 - has 1-2 personal factor/comorbidity     [] 95005 - has >3 personal factor/comorbidity     Body Regions:  [] Objective examination findings  [] Head     []  Neck  [] Trunk   [] Upper Extremity  [] Lower Extremity    Body Systems:  [] For communication ability, affect, cognition, language, and learning style: the assessment of the ability to make needs known, consciousness, orientation (person, place, and time), expected emotional /behavioral responses, and learning preferences (eg, learning barriers, education  needs)  [] For the neuromuscular system: a general assessment of gross coordinated movement (eg, balance, gait, locomotion, transfers, and transitions) and motor function  (motor control and motor learning)  [] For the musculoskeletal system: the assessment of gross symmetry, gross range of motion, gross strength, height, and weight  [] For the integumentary system: the assessment of pliability(texture), presence of scar formation, skin color, and skin integrity  [] For cardiovascular/pulmonary system: the assessment of heart rate, respiratory rate, blood pressure, and edema     Activity  limitations:    [] Patient's cognitive status and saf ety concerns          [] Status of current condition      [] Weight bearing restriction  [] Cardiopulmunary Restriction    Participation Restrictions:   [] Goals and goal agreement with the patient     [] Rehab potential (prognosis) and probable outcome      Examination of Body System: (criteria)    [] 50662 - addressing 1-2 elements    [] 32641 - addressing a total of 3 or more elements     [] 89733 -  Addressing a total of 4 or more elements         Clinical Presentation: (criteria)  Choose one     On examination of body system using standardized tests and measures patient presents with (CHOOSE ONE) elements from any of the following: body structures and functions, activity limitations, and/or participation restrictions.  Leading to a clinical presentation that is considered (CHOOSE ONE)                              Clinical Decision Making  (Eval Complexity):  Choose One     Time Tracking:     PT Received On: 01/19/18  PT Start Time: 0933     PT Stop Time: 0951  PT Total Time (min): 18 min     Billable Minutes: Evaluation 8 min and Gait Training 10 min      Cheryle Palma, PT  01/19/2018

## 2018-01-19 NOTE — SUBJECTIVE & OBJECTIVE
Interval History/Significant Events:      No acute events overnight. Albumin 500 5% x2 for low CVP and metabolic acidosis. Adequate uop, 2.6L/24h. Renal function stable.     Follow-up For: Procedure(s) (LRB):  Mitral Valve Repair (N/A)    Post-Operative Day: 1 Day Post-Op    Objective:     Vital Signs (Most Recent):  Temp: 98.3 °F (36.8 °C) (01/19/18 0800)  Pulse: 86 (01/19/18 0801)  Resp: 14 (01/19/18 0801)  BP: 99/65 (01/19/18 0800)  SpO2: 99 % (01/19/18 0801) Vital Signs (24h Range):  Temp:  [98 °F (36.7 °C)-99.1 °F (37.3 °C)] 98.3 °F (36.8 °C)  Pulse:  [84-98] 86  Resp:  [14-40] 14  SpO2:  [97 %-100 %] 99 %  BP: ()/(60-73) 99/65  Arterial Line BP: ()/(54-75) 100/55     Weight: 96.2 kg (212 lb 1.3 oz)  Body mass index is 33.22 kg/m².      Intake/Output Summary (Last 24 hours) at 01/19/18 0905  Last data filed at 01/19/18 0800   Gross per 24 hour   Intake             5659 ml   Output             2892 ml   Net             2767 ml       Physical Exam   Constitutional: She is oriented to person, place, and time. She appears well-developed and well-nourished. No distress.   HENT:   Head: Normocephalic and atraumatic.   Eyes: No scleral icterus.   Cardiovascular: Normal rate and regular rhythm.    Sternal incision appropriately tender, cdi, CT x 1 meds SS, 202   Pulmonary/Chest: Effort normal. No stridor. No respiratory distress.   Abdominal: Soft. She exhibits no distension.   Genitourinary:   Genitourinary Comments: Johnston in place   Neurological: She is alert and oriented to person, place, and time.   Skin: Skin is warm. Capillary refill takes less than 2 seconds.   Psychiatric: She has a normal mood and affect.       Vents:  Oxygen Concentration (%): 28 (01/19/18 0055)    Lines/Drains/Airways     Central Venous Catheter Line                 Introducer 01/18/18 0720 right internal jugular 1 day         Percutaneous Central Line Insertion/Assessment - triple lumen  01/18/18 1540 right internal jugular less  than 1 day          Drain                 Urethral Catheter 01/18/18 0748 Temperature probe 16 Fr. 1 day         Y Chest Tube 1 and 2 01/18/18 1328 1 Mediastinal 19 Fr. 2 Mediastinal 19 Fr. less than 1 day          Epidural Line                 Perineural Analgesia/Anesthesia Assessment (using dermatomes) Epidural 01/18/18 0801 1 day          Arterial Line                 Arterial Line 01/18/18 0711 Left Radial 1 day          Line                 Pacer Wires 01/18/18 1322 less than 1 day          Peripheral Intravenous Line                 Peripheral IV - Single Lumen 01/18/18 0600 Left Hand 1 day         Peripheral IV - Single Lumen 01/18/18 0730 Right Hand 1 day                Significant Labs:    CBC/Anemia Profile:    Recent Labs  Lab 01/18/18  1534 01/18/18  1543 01/19/18  0240   WBC 16.98*  --  10.13   HGB 10.3*  --  8.1*   HCT 32.2* 29* 25.4*   PLT 98*  --  82*   MCV 85  --  84   RDW 13.6  --  13.8        Chemistries:    Recent Labs  Lab 01/18/18  1805 01/18/18 2011 01/19/18  0240     --  144   K 4.3 4.0 3.7   *  --  116*   CO2 SEE COMMENT  --  22*   BUN 7  --  9   CREATININE 0.8  --  0.8   CALCIUM 8.0*  --  8.3*   MG 2.6  --  2.3   PHOS 2.6*  --  2.5*       ABGs:   Recent Labs  Lab 01/19/18  0039   PH 7.351   PCO2 38.9   HCO3 21.5*   POCSATURATED 99   BE -4     CMP:   Recent Labs  Lab 01/18/18  1805 01/18/18 2011 01/19/18  0240     --  144   K 4.3 4.0 3.7   *  --  116*   CO2 SEE COMMENT  --  22*   *  --  118*   BUN 7  --  9   CREATININE 0.8  --  0.8   CALCIUM 8.0*  --  8.3*   ANIONGAP SEE COMMENT  --  6*   EGFRNONAA >60.0  --  >60.0     Cardiac Markers: No results for input(s): CKMB, TROPONINT, MYOGLOBIN in the last 48 hours.     Coagulation:   Recent Labs  Lab 01/19/18  0240   INR 1.2   APTT 29.6     Lactic Acid: No results for input(s): LACTATE in the last 48 hours.  Prealbumin: No results for input(s): PREALBUMIN in the last 48 hours.    Significant Imaging:  I have  reviewed all pertinent imaging results/findings within the past 24 hours.

## 2018-01-19 NOTE — ASSESSMENT & PLAN NOTE
Alondra Carrera is a 47 y.o. female with a pmhx of severe MR with MVP who is now s/p MVR.    Neuro:  - AAOx3  - pain control: per CTS    Cardiac:  - Severe MR s/p MVR: doing well post procedure  - off cardene  -   - statin therapy    Respiratory:  - on NC, wean oxygen as tolerated  - f/u CXR on POD1  - PRN ABG    Renal:  - montague in place  - monitor UOP  - monitor Cr/ BUN    Endocrine:  - hyperglycemia: wean insulin gtt as tolerated  - q1hr glucose  - endocrine consulted    ID:  - continue post-op abx x5 doses  - will follow WBC    Heme:  - CBC and INR ordered will follow up  - transfuse if needed    FEN/GI:  - MIVF  - NPO advance per CTS   - replace lytes as needed  - protonix ordered    Dispo:  -Continue care in SICU, CTS primary

## 2018-01-19 NOTE — PLAN OF CARE
Problem: Patient Care Overview  Goal: Plan of Care Review  Outcome: Ongoing (interventions implemented as appropriate)  Pt stable throughout shift. Remains on 2L NC. VSS, MAP maintained 65-85 w/ PRN hydralazine. Pain controlled with PO oxycodone. Insulin currently at 0.3 units/hr, BG checked q1hr. UO , last CVP 7. CT with insignificant serosanguineous output. See flowsheets for details.  at bedside, updated thoroughly on POC.

## 2018-01-19 NOTE — CARE UPDATE
"Patient seen by RRT due to a charted RR of 38 and temp of 101.  Upon assessment, patient found to be sitting in bed with no signs of distress.  Stated "I feel fine"  CTA bilat upon auscultation. RR upon assessment 18.  Follow up with bedside RN regarding treatment of temperature.       Call RRT at [phone] regarding any further issues.   "

## 2018-01-19 NOTE — PT/OT/SLP EVAL
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  Pain/Comfort:  · Pain Rating 1: 7/10  · Location - Side 1: Bilateral  · Location - Orientation 1: midline  · Location 1: sternal  · Pain Addressed 1: Pre-medicate for activity, Distraction  · Pain Rating Post-Intervention 1: 7/10    Objective:     Patient found with: arterial line, central line, chest tube, blood pressure cuff, montague catheter, pulse ox (continuous), telemetry    General Precautions: Standard, fall, sternal   Orthopedic Precautions:    Braces:       Occupational Performance:    Bed Mobility:    ·     Functional Mobility/Transfers:  · Patient completed Sit <> Stand Transfer with contact guard assistance  with  no assistive device     Activities of Daily Living:  · Feeding:  stand by assistance    · Grooming: contact guard assistance standing at sink  · UB Dressing: maximal assistance    · LB Dressing: maximal assistance      Cognitive/Visual Perceptual:  Oriented to: Person, Place, Time and Situation  Follows  "Commands/attention: Follows multistep  commands  Communication: clear/fluent  Memory:  No Deficits noted  Safety awareness/insight to disability: intact  Coping skills/emotional control: Appropriate to situation    Physical Exam:  Postural examination/scapula alignment:    -       No postural abnormalities identified  Sensation:    -       Intact  Upper Extremity Range of Motion:     -       Right Upper Extremity: WFL  -       Left Upper Extremity: WFL  Upper Extremity Strength:    -       Right Upper Extremity: WFL  -       Left Upper Extremity: WFL   Strength:    -       Right Upper Extremity: WFL  -       Left Upper Extremity: WFL  Fine Motor Coordination:    -       Intact  Gross motor coordination:   WFL    Patient left up in chair with all lines intact, call button in reach, rn notified and  present    AMPA 6 Click:  [facility] Total Score: 14    Treatment & Education:  Pt ed on OT POC and sternal precautions during ADL  Pt performed functional mobility to/from sink with minimal A  Education:    Assessment:     Alondra Carrera is a 47 y.o. female with a medical diagnosis of S/P MVR (mitral valve repair).    Performance deficits affecting function are weakness, impaired functional mobilty, gait instability, impaired endurance, impaired balance, impaired self care skills, decreased upper extremity function, impaired cardiopulmonary response to activity.      Rehab Prognosis:  Good; patient would benefit from acute skilled OT services to address these deficits and reach maximum level of function.         Clinical Decision Makin.  OT Low:  "Pt evaluation falls under low complexity for evaluation coding due to performance deficits noted in 1-3 areas as stated above and 0 co-morbities affecting current functional status. Data obtained from problem focused assessments. No modifications or assistance was required for completion of evaluation. Only brief occupational profile and history review " "completed."     Plan:     Patient to be seen 5 x/week to address the above listed problems via self-care/home management, therapeutic activities, therapeutic exercises  · Plan of Care Expires: 02/18/18  · Plan of Care Reviewed with: patient, spouse    This Plan of care has been discussed with the patient who was involved in its development and understands and is in agreement with the identified goals and treatment plan    GOALS:    Occupational Therapy Goals        Problem: Occupational Therapy Goal    Goal Priority Disciplines Outcome Interventions   Occupational Therapy Goal     OT, PT/OT Ongoing (interventions implemented as appropriate)    Description:  Goals to be met by: 1/29/18     Patient will increase functional independence with ADLs by performing:    Feeding with [place].  UE Dressing with Supervision.  LE Dressing with Supervision.  Grooming while standing at sink with Supervision.  Toileting from toilet with Supervision for hygiene and clothing management.   Toilet transfer to toilet with Supervision.                      Time Tracking:     OT Date of Treatment: 01/19/18  OT Start Time: 0931  OT Stop Time: 0950  OT Total Time (min): 19 min    Billable Minutes:Evaluation 10  Self Care/Home Management 8    ARIANA Almendarez  1/19/2018    "

## 2018-01-19 NOTE — ASSESSMENT & PLAN NOTE
BG goal at 110-140 mg/dl  Continue CTS protocol, change bg monitoring to q2h    Discharge planning: anticipate nice resolution

## 2018-01-19 NOTE — PLAN OF CARE
Problem: Physical Therapy Goal  Goal: Physical Therapy Goal  Goals to be met by: 18    Patient will increase functional independence with mobility by performin. Supine to sit with Stand-by Assistance - not met  2. Sit to stand transfer with Supervision - not met  3. Gait  x 220 feet with Supervision - not met  4. Ascend/descend 12 stair with right Handrails Contact Guard Assistance - not met    eval completed and goals appropriate. Cheryle Palma, PT 2018

## 2018-01-19 NOTE — CONSULTS
"Cardiac Rehab     Alondra Carrera   [id number]   1/19/2018       Activity taught: Yes    Cardiac Rehab Phase Taught: Phase 1 & 2    Risk Factors-Modifiable: nutrition, hypertension, tobacco use, exercise    Risk Factors-Non modifiable: race    Teaching Method: Verbal, Written, Living with Heart Disease Booklet    Understanding: Yes    Response: Patient and  verbalized understanding and questions answered. Smoking cessation counseling <10 minutes. States she smoke 3-5 cigarettes/day and plans to remain smoke free. Last smoke was before hospital admission.    Comments: S/P MVR. Discussed cardiac rehab and risk factor modification. Team to refer patient to [facility] Phase [facility]. Educational materials were used in the process and given to the patient. They included "Your Guide to Living with Heart Disease", Phase Two Cardiac Rehabilitation information along with a sample Mediterranean diet.The patient expressed understanding of the teaching and expressed desire to take a role in modifying the risk factors when they return home.        "

## 2018-01-19 NOTE — PROGRESS NOTES
Patient brought from OR to SICU [id number] with OR team and anethesia. 15 L simple facemask oxy saturation 99%. Arrived extubated and no sedation infusing. Will continue to monitor.

## 2018-01-19 NOTE — SUBJECTIVE & OBJECTIVE
Interval History: POD1 MVr. No acute events overnight. Albumin 500 5% x2 for low CVP and metabolic acidosis post-op, corrected. Adequate uop, 2.6L/24h. Renal function stable.     Medications:  Continuous Infusions:   dextrose 5 % and 0.45 % NaCl with KCl 20 mEq 5 mL/hr at 01/19/18 0600    epinephrine      insulin (HUMAN R) infusion (adults) 0.3 Units/hr (01/19/18 0600)    nicardipine Stopped (01/18/18 2200)    propofol       Scheduled Meds:   albuterol-ipratropium 2.5mg-0.5mg/3mL  3 mL Nebulization Q4H    aspirin  325 mg Oral Daily    atorvastatin  40 mg Oral QHS    ceFAZolin (ANCEF) IVPB  2 g Intravenous Q8H    docusate sodium  100 mg Oral BID    furosemide  40 mg Intravenous BID    metoprolol tartrate  12.5 mg Oral BID    mupirocin  1 g Nasal BID    pantoprazole  40 mg Intravenous Daily    polyethylene glycol  17 g Oral Daily    potassium chloride  40 mEq Oral Once    sodium chloride 0.9%  3 mL Intravenous Q8H     PRN Meds:acetaminophen, albuterol-ipratropium 2.5mg-0.5mg/3mL, bisacodyl, dextrose 50%, dextrose 50%, fentaNYL, hydrALAZINE, magnesium sulfate IVPB, magnesium sulfate IVPB, metoclopramide HCl, ondansetron, oxyCODONE, oxyCODONE     Objective:     Vital Signs (Most Recent):  Temp: 98.9 °F (37.2 °C) (01/19/18 0300)  Pulse: 90 (01/19/18 0645)  Resp: (!) 26 (01/19/18 0645)  BP: 106/64 (01/19/18 0500)  SpO2: 100 % (01/19/18 0645) Vital Signs (24h Range):  Temp:  [98 °F (36.7 °C)-99.1 °F (37.3 °C)] 98.9 °F (37.2 °C)  Pulse:  [84-98] 90  Resp:  [17-40] 26  SpO2:  [97 %-100 %] 100 %  BP: ()/(60-73) 106/64  Arterial Line BP: ()/(54-75) 99/54     Weight: 96.2 kg (212 lb 1.3 oz)  Body mass index is 33.22 kg/m².    SpO2: 100 %  O2 Device (Oxygen Therapy): nasal cannula    Intake/Output - Last 3 Shifts       01/17 0700 - 01/18 0659 01/18 0700 - 01/19 0659 01/19 0700 - 01/20 0659    P.O.  500     I.V. (mL/kg)  3973 (41.3)     Blood  1708     Other  178     IV Piggyback  100     Total  Intake(mL/kg)  6459 (67.1)     Urine (mL/kg/hr)  2620 (1.1)     Chest Tube  202 (0.1)     Total Output   2822      Net   +3637                   Lines/Drains/Airways     Central Venous Catheter Line                 Introducer 01/18/18 0720 right internal jugular 1 day         Percutaneous Central Line Insertion/Assessment - triple lumen  01/18/18 1540 right internal jugular less than 1 day          Drain                 Urethral Catheter 01/18/18 0748 Temperature probe 16 Fr. 1 day         Y Chest Tube 1 and 2 01/18/18 1328 1 Mediastinal 19 Fr. 2 Mediastinal 19 Fr. less than 1 day          Epidural Line                 Perineural Analgesia/Anesthesia Assessment (using dermatomes) Epidural 01/18/18 0801 less than 1 day          Arterial Line                 Arterial Line 01/18/18 0711 Left Radial 1 day          Line                 Pacer Wires 01/18/18 1322 less than 1 day          Peripheral Intravenous Line                 Peripheral IV - Single Lumen 01/18/18 0600 Left Hand 1 day         Peripheral IV - Single Lumen 01/18/18 0730 Right Hand 1 day                Physical Exam   Constitutional: She is oriented to person, place, and time. She appears well-developed and well-nourished. No distress.   HENT:   Head: Normocephalic and atraumatic.   Eyes: No scleral icterus.   Cardiovascular: Normal rate and regular rhythm.    Sternal incision appropriately tender, cdi, CT x 1 meds SS, 202   Pulmonary/Chest: Effort normal. No stridor. No respiratory distress.   Abdominal: Soft. She exhibits no distension.   Genitourinary:   Genitourinary Comments: Johnston in place   Neurological: She is alert and oriented to person, place, and time.   Skin: Skin is warm. Capillary refill takes less than 2 seconds.   Psychiatric: She has a normal mood and affect.       Significant Labs:  ABGs:   Recent Labs  Lab 01/19/18  0039   PH 7.351   PCO2 38.9   PO2 125*   HCO3 21.5*   POCSATURATED 99   BE -4     CBC:   Recent Labs  Lab 01/19/18  0240    WBC 10.13   RBC 3.03*   HGB 8.1*   HCT 25.4*   PLT 82*   MCV 84   MCH 26.7*   MCHC 31.9*     CMP:   Recent Labs  Lab 01/19/18  0240   *   CALCIUM 8.3*      K 3.7   CO2 22*   *   BUN 9   CREATININE 0.8     Coagulation:   Recent Labs  Lab 01/19/18  0240   INR 1.2   APTT 29.6     Lactic Acid: No results for input(s): LACTATE in the last 48 hours.  All pertinent labs from the last 24 hours have been reviewed.    Significant Diagnostics:  I have reviewed and interpreted all pertinent imaging results/findings within the past 24 hours. clear, minimal L- effusion

## 2018-01-20 PROBLEM — I34.0 MITRAL VALVE INSUFFICIENCY: Status: RESOLVED | Noted: 2018-01-19 | Resolved: 2018-01-20

## 2018-01-20 PROBLEM — D62 ACUTE BLOOD LOSS ANEMIA: Status: ACTIVE | Noted: 2018-01-20

## 2018-01-20 PROBLEM — E83.39 HYPOPHOSPHATEMIA: Status: ACTIVE | Noted: 2018-01-20

## 2018-01-20 LAB
ANION GAP SERPL CALC-SCNC: 7 MMOL/L
APTT BLDCRRT: 31.5 SEC
BASOPHILS # BLD AUTO: 0.01 K/UL
BASOPHILS NFR BLD: 0.1 %
BUN SERPL-MCNC: 15 MG/DL
CALCIUM SERPL-MCNC: 8.7 MG/DL
CHLORIDE SERPL-SCNC: 110 MMOL/L
CO2 SERPL-SCNC: 24 MMOL/L
CREAT SERPL-MCNC: 1 MG/DL
DIFFERENTIAL METHOD: ABNORMAL
EOSINOPHIL # BLD AUTO: 0 K/UL
EOSINOPHIL NFR BLD: 0.1 %
ERYTHROCYTE [DISTWIDTH] IN BLOOD BY AUTOMATED COUNT: 14.4 %
EST. GFR  (AFRICAN AMERICAN): >60 ML/MIN/1.73 M^2
EST. GFR  (NON AFRICAN AMERICAN): >60 ML/MIN/1.73 M^2
GLUCOSE SERPL-MCNC: 113 MG/DL
HCT VFR BLD AUTO: 25.3 %
HGB BLD-MCNC: 8 G/DL
IMM GRANULOCYTES # BLD AUTO: 0.07 K/UL
IMM GRANULOCYTES NFR BLD AUTO: 0.5 %
INR PPP: 1.1
LYMPHOCYTES # BLD AUTO: 1.2 K/UL
LYMPHOCYTES NFR BLD: 8.3 %
MAGNESIUM SERPL-MCNC: 2.1 MG/DL
MCH RBC QN AUTO: 27 PG
MCHC RBC AUTO-ENTMCNC: 31.6 G/DL
MCV RBC AUTO: 86 FL
MONOCYTES # BLD AUTO: 1.6 K/UL
MONOCYTES NFR BLD: 10.7 %
NEUTROPHILS # BLD AUTO: 11.7 K/UL
NEUTROPHILS NFR BLD: 80.3 %
NRBC BLD-RTO: 0 /100 WBC
PHOSPHATE SERPL-MCNC: 1.8 MG/DL
PLATELET # BLD AUTO: 89 K/UL
PMV BLD AUTO: 11.5 FL
POCT GLUCOSE: 135 MG/DL (ref 70–110)
POCT GLUCOSE: 136 MG/DL (ref 70–110)
POTASSIUM SERPL-SCNC: 4.1 MMOL/L
PROTHROMBIN TIME: 11.1 SEC
RBC # BLD AUTO: 2.96 M/UL
SODIUM SERPL-SCNC: 141 MMOL/L
WBC # BLD AUTO: 14.52 K/UL

## 2018-01-20 PROCEDURE — 25000003 PHARM REV CODE 250: Performed by: NURSE PRACTITIONER

## 2018-01-20 PROCEDURE — 83735 ASSAY OF MAGNESIUM: CPT

## 2018-01-20 PROCEDURE — C9113 INJ PANTOPRAZOLE SODIUM, VIA: HCPCS | Performed by: NURSE PRACTITIONER

## 2018-01-20 PROCEDURE — 63600175 PHARM REV CODE 636 W HCPCS: Performed by: STUDENT IN AN ORGANIZED HEALTH CARE EDUCATION/TRAINING PROGRAM

## 2018-01-20 PROCEDURE — 36415 COLL VENOUS BLD VENIPUNCTURE: CPT

## 2018-01-20 PROCEDURE — 63600175 PHARM REV CODE 636 W HCPCS: Performed by: NURSE PRACTITIONER

## 2018-01-20 PROCEDURE — 97116 GAIT TRAINING THERAPY: CPT

## 2018-01-20 PROCEDURE — 20600001 HC STEP DOWN PRIVATE ROOM

## 2018-01-20 PROCEDURE — 85610 PROTHROMBIN TIME: CPT

## 2018-01-20 PROCEDURE — 84100 ASSAY OF PHOSPHORUS: CPT

## 2018-01-20 PROCEDURE — 80048 BASIC METABOLIC PNL TOTAL CA: CPT

## 2018-01-20 PROCEDURE — 85025 COMPLETE CBC W/AUTO DIFF WBC: CPT

## 2018-01-20 PROCEDURE — 85730 THROMBOPLASTIN TIME PARTIAL: CPT

## 2018-01-20 PROCEDURE — 25000003 PHARM REV CODE 250: Performed by: STUDENT IN AN ORGANIZED HEALTH CARE EDUCATION/TRAINING PROGRAM

## 2018-01-20 RX ORDER — CLONAZEPAM 0.5 MG/1
0.5 TABLET ORAL 2 TIMES DAILY PRN
Status: DISCONTINUED | OUTPATIENT
Start: 2018-01-20 | End: 2018-01-24

## 2018-01-20 RX ORDER — FUROSEMIDE 10 MG/ML
40 INJECTION INTRAMUSCULAR; INTRAVENOUS 3 TIMES DAILY
Status: DISCONTINUED | OUTPATIENT
Start: 2018-01-20 | End: 2018-01-23

## 2018-01-20 RX ORDER — DULOXETIN HYDROCHLORIDE 60 MG/1
60 CAPSULE, DELAYED RELEASE ORAL DAILY
Status: DISCONTINUED | OUTPATIENT
Start: 2018-01-20 | End: 2018-01-24 | Stop reason: HOSPADM

## 2018-01-20 RX ORDER — METOPROLOL TARTRATE 25 MG/1
25 TABLET, FILM COATED ORAL 2 TIMES DAILY
Status: DISCONTINUED | OUTPATIENT
Start: 2018-01-20 | End: 2018-01-22

## 2018-01-20 RX ORDER — TIOTROPIUM BROMIDE 18 UG/1
1 CAPSULE ORAL; RESPIRATORY (INHALATION) DAILY
Status: DISCONTINUED | OUTPATIENT
Start: 2018-01-21 | End: 2018-01-24 | Stop reason: HOSPADM

## 2018-01-20 RX ORDER — FAMOTIDINE 20 MG/1
20 TABLET, FILM COATED ORAL 2 TIMES DAILY
Status: DISCONTINUED | OUTPATIENT
Start: 2018-01-20 | End: 2018-01-24 | Stop reason: HOSPADM

## 2018-01-20 RX ORDER — SIMVASTATIN 20 MG/1
20 TABLET, FILM COATED ORAL NIGHTLY
Status: DISCONTINUED | OUTPATIENT
Start: 2018-01-20 | End: 2018-01-24 | Stop reason: HOSPADM

## 2018-01-20 RX ORDER — MONTELUKAST SODIUM 10 MG/1
10 TABLET ORAL DAILY
Status: DISCONTINUED | OUTPATIENT
Start: 2018-01-20 | End: 2018-01-24

## 2018-01-20 RX ORDER — SODIUM,POTASSIUM PHOSPHATES 280-250MG
2 POWDER IN PACKET (EA) ORAL
Status: DISCONTINUED | OUTPATIENT
Start: 2018-01-20 | End: 2018-01-24

## 2018-01-20 RX ADMIN — DULOXETINE 60 MG: 60 CAPSULE, DELAYED RELEASE ORAL at 01:01

## 2018-01-20 RX ADMIN — DOCUSATE SODIUM 100 MG: 50 CAPSULE, LIQUID FILLED ORAL at 08:01

## 2018-01-20 RX ADMIN — CLONAZEPAM 0.5 MG: 0.5 TABLET ORAL at 08:01

## 2018-01-20 RX ADMIN — METOPROLOL TARTRATE 25 MG: 25 TABLET ORAL at 08:01

## 2018-01-20 RX ADMIN — POLYETHYLENE GLYCOL 3350 17 G: 17 POWDER, FOR SOLUTION ORAL at 08:01

## 2018-01-20 RX ADMIN — ASPIRIN 325 MG ORAL TABLET 325 MG: 325 PILL ORAL at 08:01

## 2018-01-20 RX ADMIN — SIMVASTATIN 20 MG: 20 TABLET, FILM COATED ORAL at 08:01

## 2018-01-20 RX ADMIN — ACETAMINOPHEN 650 MG: 325 TABLET ORAL at 06:01

## 2018-01-20 RX ADMIN — PANTOPRAZOLE SODIUM 40 MG: 40 INJECTION, POWDER, FOR SOLUTION INTRAVENOUS at 08:01

## 2018-01-20 RX ADMIN — OXYCODONE HYDROCHLORIDE 10 MG: 5 TABLET ORAL at 06:01

## 2018-01-20 RX ADMIN — FAMOTIDINE 20 MG: 20 TABLET, FILM COATED ORAL at 08:01

## 2018-01-20 RX ADMIN — POTASSIUM & SODIUM PHOSPHATES POWDER PACK 280-160-250 MG 2 PACKET: 280-160-250 PACK at 08:01

## 2018-01-20 RX ADMIN — MONTELUKAST SODIUM 10 MG: 10 TABLET, FILM COATED ORAL at 01:01

## 2018-01-20 RX ADMIN — FUROSEMIDE 40 MG: 10 INJECTION, SOLUTION INTRAMUSCULAR; INTRAVENOUS at 08:01

## 2018-01-20 RX ADMIN — POTASSIUM & SODIUM PHOSPHATES POWDER PACK 280-160-250 MG 2 PACKET: 280-160-250 PACK at 05:01

## 2018-01-20 RX ADMIN — OXYCODONE HYDROCHLORIDE 5 MG: 5 TABLET ORAL at 08:01

## 2018-01-20 RX ADMIN — POTASSIUM PHOSPHATE, MONOBASIC AND POTASSIUM PHOSPHATE, DIBASIC 30 MMOL: 224; 236 INJECTION, SOLUTION INTRAVENOUS at 02:01

## 2018-01-20 RX ADMIN — POTASSIUM CHLORIDE 20 MEQ: 1500 TABLET, EXTENDED RELEASE ORAL at 08:01

## 2018-01-20 RX ADMIN — FUROSEMIDE 40 MG: 10 INJECTION, SOLUTION INTRAMUSCULAR; INTRAVENOUS at 01:01

## 2018-01-20 RX ADMIN — Medication 12.5 MG: at 08:01

## 2018-01-20 NOTE — ASSESSMENT & PLAN NOTE
POD #2:  -sternal precautions  -PT/OT  -Ambulate  -DC chest tubes today  -monitor rhythm  -discharge planing ongoing

## 2018-01-20 NOTE — PROGRESS NOTES
[facility]  Cardiothoracic Surgery  Progress Note    Patient Name: Alondra Carrera  MRN: [id number]  Admission Date: 1/18/2018  Hospital Length of Stay: 2 days  Code Status: Full Code   Attending Physician: Randall Sorensen MD   Referring Provider: Randall Sorensen MD  Principal Problem:S/P MVR (mitral valve repair)         Post-Op Info:  Procedure(s) (LRB):  Mitral Valve Repair (N/A)   2 Days Post-Op     Interval History: No acute events overnight.  SR per monitor.    Medications:  Continuous Infusions:  Scheduled Meds:   aspirin  325 mg Oral Daily    docusate sodium  100 mg Oral BID    DULoxetine  60 mg Oral Daily    famotidine  20 mg Oral BID    furosemide  40 mg Intravenous TID    metoprolol tartrate  25 mg Oral BID    montelukast  10 mg Oral Daily    polyethylene glycol  17 g Oral Daily    potassium chloride SA  20 mEq Oral BID    simvastatin  20 mg Oral Nightly    [START ON 1/21/2018] tiotropium  1 capsule Inhalation Daily     PRN Meds:acetaminophen, albuterol-ipratropium 2.5mg-0.5mg/3mL, bisacodyl, clonazePAM, dextrose 50%, dextrose 50%, glucagon (human recombinant), glucose, glucose, insulin aspart, metoclopramide HCl, ondansetron, oxyCODONE     Objective:     Vital Signs (Most Recent):  Temp: 97.6 °F (36.4 °C) (01/20/18 1108)  Pulse: 86 (01/20/18 1108)  Resp: 18 (01/20/18 1108)  BP: 98/60 (01/20/18 1108)  SpO2: 100 % (01/20/18 1108) Vital Signs (24h Range):  Temp:  [97.6 °F (36.4 °C)-101.6 °F (38.7 °C)] 97.6 °F (36.4 °C)  Pulse:  [86-96] 86  Resp:  [16-39] 18  SpO2:  [88 %-100 %] 100 %  BP: ()/(52-61) 98/60  Arterial Line BP: ()/(46-52) 102/50     Weight: 84.4 kg (186 lb 1.1 oz)  Body mass index is 29.14 kg/m².    SpO2: 100 %  O2 Device (Oxygen Therapy): room air    Intake/Output - Last 3 Shifts       01/18 0700 - 01/19 0659 01/19 0700 - 01/20 0659 01/20 0700 - 01/21 0659    P.O. 500 780     I.V. (mL/kg) 3973 (41.3) 54 (0.6)     Blood 1708      Other 178       IV Piggyback 100      Total Intake(mL/kg) 6459 (67.1) 834 (8.7)     Urine (mL/kg/hr) 2620 (1.1) 1690 (0.7) 220 (0.4)    Stool  0 (0)     Chest Tube 202 (0.1) 80 (0)     Total Output 2822 1770 220    Net +3637 -936 -220           Stool Occurrence  0 x           Lines/Drains/Airways     Central Venous Catheter Line                 Introducer 01/18/18 0720 right internal jugular 2 days         Percutaneous Central Line Insertion/Assessment - double lumen  01/18/18  right internal jugular 2 days         Percutaneous Central Line Insertion/Assessment - triple lumen  01/18/18 1540 right internal jugular 1 day          Drain                 Y Chest Tube 1 and 2 01/18/18 1328 1 Mediastinal 19 Fr. 2 Mediastinal 19 Fr. 1 day          Epidural Line                 Perineural Analgesia/Anesthesia Assessment (using dermatomes) Epidural 01/18/18 0801 2 days          Line                 Pacer Wires 01/18/18 1322 1 day          Peripheral Intravenous Line                 Peripheral IV - Single Lumen 01/18/18 0600 Left Hand 2 days         Peripheral IV - Single Lumen 01/18/18 0730 Right Hand 2 days                Physical Exam   Constitutional: She is oriented to person, place, and time. She appears well-developed and well-nourished.   HENT:   Head: Normocephalic and atraumatic.   Eyes: EOM are normal. Pupils are equal, round, and reactive to light.   Neck: Normal range of motion. Neck supple.   Cardiovascular: Normal rate.    Pulmonary/Chest: Effort normal and breath sounds normal.   Abdominal: Soft. Bowel sounds are normal.   Musculoskeletal: Normal range of motion.   Neurological: She is alert and oriented to person, place, and time.   Skin: Skin is warm and dry.   Nursing note and vitals reviewed.      Significant Labs:  BMP:   Recent Labs  Lab 01/20/18  0402   *      K 4.1      CO2 24   BUN 15   CREATININE 1.0   CALCIUM 8.7   MG 2.1     CBC:   Recent Labs  Lab 01/20/18  0402   WBC 14.52*   RBC 2.96*   HGB  8.0*   HCT 25.3*   PLT 89*   MCV 86   MCH 27.0   MCHC 31.6*       Significant Diagnostics:  Chest xray reviewed    Assessment/Plan:     * S/P MVR (mitral valve repair)    POD #2:  -sternal precautions  -PT/OT  -Ambulate  -DC chest tubes today  -monitor rhythm  -discharge planing ongoing        Acute blood loss anemia    Monitor H&H and transfuse prn.        Hypophosphatemia    Monitor electrolytes and replace prn.        Stress hyperglycemia    accuchecks QID.  Endocrine following.            Lolita Dawson NP  Cardiothoracic Surgery  [facility]

## 2018-01-20 NOTE — PROGRESS NOTES
Midline chest dressing d/phylicia as orderd. No drainage noted. Steri strips intact. Johnston catheter d/phylicia intact. 220mls clear yellow urine.

## 2018-01-20 NOTE — HOSPITAL COURSE
On 1/18/18, the patient was taken to the Operating Room for the above stated procedure. Please see the previously dictated operative report for complete details. Postoperatively, the patient was taken from the  Operating Room to the ICU where the vital signs were monitored and pain was kept under control. The patient was weaned from the drips and extubated in the ICU per protocol. Once hemodynamically stable, the patient was transferred to the Cardiac Step-Down floor for continued strengthening and ambulation. On postoperative day 6, the patient was ready for discharge to home. At the time of discharge, the patient was ambulating unassisted. Pain was well controlled with oral analgesics and the patient was tolerating the diet.     MOBILITY AND ACTIVITY: As tolerated. Patient may shower. No heavy lifting of greater than 5 pounds and no driving.     DIET: An 1800-calorie ADA with a 1500 mL fluid restriction.     WOUND CARE INSTRUCTIONS: Check for redness, swelling and drainage around the  incision or wound. Patient is to call for any obvious bleeding, drainage, pus from the wound, unusual problems or difficulties or temperature of greater than 101   degrees.     FOLLOWUP: Follow up with Dr. Sorensen in approximately 3 weeks. Prior to this  appointment, the patient will have a chest x-ray and EKG.     Patient was not prescribed an ace inhibitor at time of discharge due to potential for hypotension.     DISCHARGE CONDITION: At the time of discharge, the patient was in sinus rhythm and afebrile with stable vital signs.

## 2018-01-20 NOTE — PT/OT/SLP PROGRESS
Physical Therapy Treatment    Patient Name:  Alondra Carrera   MRN:  [id number]    Recommendations:     Discharge Recommendations:   (no further PT needs)   Discharge Equipment Recommendations: none   Barriers to discharge: None    Assessment:     Alondra Carrera is a 47 y.o. female admitted with a medical diagnosis of S/P MVR (mitral valve repair).  She presents with the following impairments/functional limitations:  weakness, impaired endurance, impaired functional mobilty, gait instability, impaired balance pt jorge treatment better being able to gait train farther but lethargy decreased functional mobility. Pt will benefit from skilled PT 5x/wk and return pt to Independent status. Pt should be able to discharge home with no therapy or DME needs..    Rehab Prognosis:  good; patient would benefit from acute skilled PT services to address these deficits and reach maximum level of function.      Recent Surgery: Procedure(s) (LRB):  Mitral Valve Repair (N/A) 2 Days Post-Op    Plan:     During this hospitalization, patient to be seen 5 x/week to address the above listed problems via gait training, therapeutic activities  · Plan of Care Expires:  02/17/18   Plan of Care Reviewed with: patient    Subjective     Communicated with nurse prior to session.  Patient found sitting in chair upon PT entry to room, agreeable to treatment.      Chief Complaint: pt c/o feeling SOB with gait training.   Patient comments/goals:  To get better and go home.   Pain/Comfort:  · Pain Rating 1: 0/10  · Pain Rating Post-Intervention 1: 0/10    Patients cultural, spiritual, Gnosticism conflicts given the current situation: none    Objective:     Patient found with: central line, montague catheter, chest tube, telemetry, oxygen     General Precautions: Standard, fall, sternal   Orthopedic Precautions:    Braces:       Functional Mobility:  · Transfers:     · Sit to Stand:  minimum assistance with no AD  ·   · Gait: 80 ft with O2 intact, CGA and  pt pushing rolling IV Pole for balance.   Pt was very lethargic and c/o SOB after gait training. RN notified and pulse ox 95%.    AM-PAC 6 CLICK MOBILITY  Turning over in bed (including adjusting bedclothes, sheets and blankets)?: 3  Sitting down on and standing up from a chair with arms (e.g., wheelchair, bedside commode, etc.): 3  Moving from lying on back to sitting on the side of the bed?: 3  Moving to and from a bed to a chair (including a wheelchair)?: 3  Need to walk in hospital room?: 3  Climbing 3-5 steps with a railing?: 2  Total Score: 17         Patient left up in chair with all lines intact and call button in reach..    GOALS:    Physical Therapy Goals        Problem: Physical Therapy Goal    Goal Priority Disciplines Outcome Goal Variances Interventions   Physical Therapy Goal     PT/OT, PT      Description:  Goals to be met by: 18    Patient will increase functional independence with mobility by performin. Supine to sit with Stand-by Assistance - not met  2. Sit to stand transfer with Supervision - not met  3. Gait  x 220 feet with Supervision - not met  4. Ascend/descend 12 stair with right Handrails Contact Guard Assistance - not met                      Time Tracking:     PT Received On: 18  PT Start Time: 817     PT Stop Time: 834  PT Total Time (min): 17 min     Billable Minutes: Gait Training 17 min    Treatment Type: Treatment  PT/PTA: PT     PTA Visit Number: 0     Cheryle Palma, PT  2018

## 2018-01-20 NOTE — SUBJECTIVE & OBJECTIVE
Interval History: No acute events overnight.  SR per monitor.    Medications:  Continuous Infusions:  Scheduled Meds:   aspirin  325 mg Oral Daily    docusate sodium  100 mg Oral BID    DULoxetine  60 mg Oral Daily    famotidine  20 mg Oral BID    furosemide  40 mg Intravenous TID    metoprolol tartrate  25 mg Oral BID    montelukast  10 mg Oral Daily    polyethylene glycol  17 g Oral Daily    potassium chloride SA  20 mEq Oral BID    simvastatin  20 mg Oral Nightly    [START ON 1/21/2018] tiotropium  1 capsule Inhalation Daily     PRN Meds:acetaminophen, albuterol-ipratropium 2.5mg-0.5mg/3mL, bisacodyl, clonazePAM, dextrose 50%, dextrose 50%, glucagon (human recombinant), glucose, glucose, insulin aspart, metoclopramide HCl, ondansetron, oxyCODONE     Objective:     Vital Signs (Most Recent):  Temp: 97.6 °F (36.4 °C) (01/20/18 1108)  Pulse: 86 (01/20/18 1108)  Resp: 18 (01/20/18 1108)  BP: 98/60 (01/20/18 1108)  SpO2: 100 % (01/20/18 1108) Vital Signs (24h Range):  Temp:  [97.6 °F (36.4 °C)-101.6 °F (38.7 °C)] 97.6 °F (36.4 °C)  Pulse:  [86-96] 86  Resp:  [16-39] 18  SpO2:  [88 %-100 %] 100 %  BP: ()/(52-61) 98/60  Arterial Line BP: ()/(46-52) 102/50     Weight: 84.4 kg (186 lb 1.1 oz)  Body mass index is 29.14 kg/m².    SpO2: 100 %  O2 Device (Oxygen Therapy): room air    Intake/Output - Last 3 Shifts       01/18 0700 - 01/19 0659 01/19 0700 - 01/20 0659 01/20 0700 - 01/21 0659    P.O. 500 780     I.V. (mL/kg) 3973 (41.3) 54 (0.6)     Blood 1708      Other 178      IV Piggyback 100      Total Intake(mL/kg) 6459 (67.1) 834 (8.7)     Urine (mL/kg/hr) 2620 (1.1) 1690 (0.7) 220 (0.4)    Stool  0 (0)     Chest Tube 202 (0.1) 80 (0)     Total Output 2822 1770 220    Net +3637 -936 -220           Stool Occurrence  0 x           Lines/Drains/Airways     Central Venous Catheter Line                 Introducer 01/18/18 0720 right internal jugular 2 days         Percutaneous Central Line  Insertion/Assessment - double lumen  01/18/18  right internal jugular 2 days         Percutaneous Central Line Insertion/Assessment - triple lumen  01/18/18 1540 right internal jugular 1 day          Drain                 Y Chest Tube 1 and 2 01/18/18 1328 1 Mediastinal 19 Fr. 2 Mediastinal 19 Fr. 1 day          Epidural Line                 Perineural Analgesia/Anesthesia Assessment (using dermatomes) Epidural 01/18/18 0801 2 days          Line                 Pacer Wires 01/18/18 1322 1 day          Peripheral Intravenous Line                 Peripheral IV - Single Lumen 01/18/18 0600 Left Hand 2 days         Peripheral IV - Single Lumen 01/18/18 0730 Right Hand 2 days                Physical Exam   Constitutional: She is oriented to person, place, and time. She appears well-developed and well-nourished.   HENT:   Head: Normocephalic and atraumatic.   Eyes: EOM are normal. Pupils are equal, round, and reactive to light.   Neck: Normal range of motion. Neck supple.   Cardiovascular: Normal rate.    Pulmonary/Chest: Effort normal and breath sounds normal.   Abdominal: Soft. Bowel sounds are normal.   Musculoskeletal: Normal range of motion.   Neurological: She is alert and oriented to person, place, and time.   Skin: Skin is warm and dry.   Nursing note and vitals reviewed.      Significant Labs:  BMP:   Recent Labs  Lab 01/20/18  0402   *      K 4.1      CO2 24   BUN 15   CREATININE 1.0   CALCIUM 8.7   MG 2.1     CBC:   Recent Labs  Lab 01/20/18  0402   WBC 14.52*   RBC 2.96*   HGB 8.0*   HCT 25.3*   PLT 89*   MCV 86   MCH 27.0   MCHC 31.6*       Significant Diagnostics:  Chest xray reviewed

## 2018-01-20 NOTE — PLAN OF CARE
Problem: Patient Care Overview  Goal: Plan of Care Review  Outcome: Ongoing (interventions implemented as appropriate)  VSS. O2 sats stable on 2L NC. Pt denies CP, SOB, palpitations, lightheadedness and dizziness. Mediastinal CT (x2) Y-ported together; connected to -20 suction. Johnston in place; draining clear urine; cath care performed this shift. Sternal drsg in place, CDI. Pacer wires isolated & secured. Fall precautions maintained this shift, pt remains free from falls, trauma and injury. POC reviewed with pt, verbalized understanding. NADN. Will continue to monitor.

## 2018-01-20 NOTE — PLAN OF CARE
Problem: Physical Therapy Goal  Goal: Physical Therapy Goal  Goals to be met by: 18    Patient will increase functional independence with mobility by performin. Supine to sit with Stand-by Assistance - not met  2. Sit to stand transfer with Supervision - not met  3. Gait  x 220 feet with Supervision - not met  4. Ascend/descend 12 stair with right Handrails Contact Guard Assistance - not met     Goals remain appropriate Cheryle Palma, PT 2018

## 2018-01-21 LAB
ANION GAP SERPL CALC-SCNC: 9 MMOL/L
ANION GAP SERPL CALC-SCNC: 9 MMOL/L
APTT BLDCRRT: 28.4 SEC
APTT BLDCRRT: 28.4 SEC
BASOPHILS # BLD AUTO: 0.03 K/UL
BASOPHILS # BLD AUTO: 0.03 K/UL
BASOPHILS NFR BLD: 0.2 %
BASOPHILS NFR BLD: 0.2 %
BUN SERPL-MCNC: 13 MG/DL
BUN SERPL-MCNC: 13 MG/DL
CALCIUM SERPL-MCNC: 8.8 MG/DL
CALCIUM SERPL-MCNC: 8.8 MG/DL
CHLORIDE SERPL-SCNC: 105 MMOL/L
CHLORIDE SERPL-SCNC: 105 MMOL/L
CO2 SERPL-SCNC: 25 MMOL/L
CO2 SERPL-SCNC: 25 MMOL/L
CREAT SERPL-MCNC: 0.8 MG/DL
CREAT SERPL-MCNC: 0.8 MG/DL
DIFFERENTIAL METHOD: ABNORMAL
DIFFERENTIAL METHOD: ABNORMAL
EOSINOPHIL # BLD AUTO: 0 K/UL
EOSINOPHIL # BLD AUTO: 0 K/UL
EOSINOPHIL NFR BLD: 0.3 %
EOSINOPHIL NFR BLD: 0.3 %
ERYTHROCYTE [DISTWIDTH] IN BLOOD BY AUTOMATED COUNT: 13.9 %
ERYTHROCYTE [DISTWIDTH] IN BLOOD BY AUTOMATED COUNT: 13.9 %
EST. GFR  (AFRICAN AMERICAN): >60 ML/MIN/1.73 M^2
EST. GFR  (AFRICAN AMERICAN): >60 ML/MIN/1.73 M^2
EST. GFR  (NON AFRICAN AMERICAN): >60 ML/MIN/1.73 M^2
EST. GFR  (NON AFRICAN AMERICAN): >60 ML/MIN/1.73 M^2
GLUCOSE SERPL-MCNC: 111 MG/DL
GLUCOSE SERPL-MCNC: 111 MG/DL
HCT VFR BLD AUTO: 23.9 %
HCT VFR BLD AUTO: 23.9 %
HGB BLD-MCNC: 7.7 G/DL
HGB BLD-MCNC: 7.7 G/DL
IMM GRANULOCYTES # BLD AUTO: 0.09 K/UL
IMM GRANULOCYTES # BLD AUTO: 0.09 K/UL
IMM GRANULOCYTES NFR BLD AUTO: 0.7 %
IMM GRANULOCYTES NFR BLD AUTO: 0.7 %
INR PPP: 1
INR PPP: 1
LYMPHOCYTES # BLD AUTO: 1 K/UL
LYMPHOCYTES # BLD AUTO: 1 K/UL
LYMPHOCYTES NFR BLD: 7.9 %
LYMPHOCYTES NFR BLD: 7.9 %
MAGNESIUM SERPL-MCNC: 1.9 MG/DL
MAGNESIUM SERPL-MCNC: 1.9 MG/DL
MCH RBC QN AUTO: 26.4 PG
MCH RBC QN AUTO: 26.4 PG
MCHC RBC AUTO-ENTMCNC: 32.2 G/DL
MCHC RBC AUTO-ENTMCNC: 32.2 G/DL
MCV RBC AUTO: 82 FL
MCV RBC AUTO: 82 FL
MONOCYTES # BLD AUTO: 0.9 K/UL
MONOCYTES # BLD AUTO: 0.9 K/UL
MONOCYTES NFR BLD: 6.9 %
MONOCYTES NFR BLD: 6.9 %
NEUTROPHILS # BLD AUTO: 10.5 K/UL
NEUTROPHILS # BLD AUTO: 10.5 K/UL
NEUTROPHILS NFR BLD: 84 %
NEUTROPHILS NFR BLD: 84 %
NRBC BLD-RTO: 0 /100 WBC
NRBC BLD-RTO: 0 /100 WBC
PHOSPHATE SERPL-MCNC: 2.2 MG/DL
PHOSPHATE SERPL-MCNC: 2.2 MG/DL
PLATELET # BLD AUTO: 102 K/UL
PLATELET # BLD AUTO: 102 K/UL
PMV BLD AUTO: 10.7 FL
PMV BLD AUTO: 10.7 FL
POTASSIUM SERPL-SCNC: 3.6 MMOL/L
POTASSIUM SERPL-SCNC: 3.6 MMOL/L
PROTHROMBIN TIME: 10.4 SEC
PROTHROMBIN TIME: 10.4 SEC
RBC # BLD AUTO: 2.92 M/UL
RBC # BLD AUTO: 2.92 M/UL
SODIUM SERPL-SCNC: 139 MMOL/L
SODIUM SERPL-SCNC: 139 MMOL/L
WBC # BLD AUTO: 12.45 K/UL
WBC # BLD AUTO: 12.45 K/UL

## 2018-01-21 PROCEDURE — 83735 ASSAY OF MAGNESIUM: CPT

## 2018-01-21 PROCEDURE — 85025 COMPLETE CBC W/AUTO DIFF WBC: CPT

## 2018-01-21 PROCEDURE — 84100 ASSAY OF PHOSPHORUS: CPT

## 2018-01-21 PROCEDURE — 85730 THROMBOPLASTIN TIME PARTIAL: CPT

## 2018-01-21 PROCEDURE — 80048 BASIC METABOLIC PNL TOTAL CA: CPT

## 2018-01-21 PROCEDURE — 85610 PROTHROMBIN TIME: CPT

## 2018-01-21 PROCEDURE — 25000003 PHARM REV CODE 250: Performed by: NURSE PRACTITIONER

## 2018-01-21 PROCEDURE — 20600001 HC STEP DOWN PRIVATE ROOM

## 2018-01-21 PROCEDURE — 25000242 PHARM REV CODE 250 ALT 637 W/ HCPCS: Performed by: NURSE PRACTITIONER

## 2018-01-21 PROCEDURE — 25000003 PHARM REV CODE 250: Performed by: STUDENT IN AN ORGANIZED HEALTH CARE EDUCATION/TRAINING PROGRAM

## 2018-01-21 PROCEDURE — 63600175 PHARM REV CODE 636 W HCPCS: Performed by: NURSE PRACTITIONER

## 2018-01-21 RX ADMIN — SIMVASTATIN 20 MG: 20 TABLET, FILM COATED ORAL at 08:01

## 2018-01-21 RX ADMIN — METOPROLOL TARTRATE 25 MG: 25 TABLET ORAL at 08:01

## 2018-01-21 RX ADMIN — FAMOTIDINE 20 MG: 20 TABLET, FILM COATED ORAL at 08:01

## 2018-01-21 RX ADMIN — POTASSIUM & SODIUM PHOSPHATES POWDER PACK 280-160-250 MG 2 PACKET: 280-160-250 PACK at 03:01

## 2018-01-21 RX ADMIN — DOCUSATE SODIUM 100 MG: 50 CAPSULE, LIQUID FILLED ORAL at 08:01

## 2018-01-21 RX ADMIN — POLYETHYLENE GLYCOL 3350 17 G: 17 POWDER, FOR SOLUTION ORAL at 08:01

## 2018-01-21 RX ADMIN — OXYCODONE HYDROCHLORIDE 5 MG: 5 TABLET ORAL at 08:01

## 2018-01-21 RX ADMIN — POTASSIUM & SODIUM PHOSPHATES POWDER PACK 280-160-250 MG 2 PACKET: 280-160-250 PACK at 06:01

## 2018-01-21 RX ADMIN — MONTELUKAST SODIUM 10 MG: 10 TABLET, FILM COATED ORAL at 08:01

## 2018-01-21 RX ADMIN — ASPIRIN 325 MG ORAL TABLET 325 MG: 325 PILL ORAL at 08:01

## 2018-01-21 RX ADMIN — TIOTROPIUM BROMIDE 18 MCG: 18 CAPSULE ORAL; RESPIRATORY (INHALATION) at 10:01

## 2018-01-21 RX ADMIN — FUROSEMIDE 40 MG: 10 INJECTION, SOLUTION INTRAMUSCULAR; INTRAVENOUS at 08:01

## 2018-01-21 RX ADMIN — POTASSIUM & SODIUM PHOSPHATES POWDER PACK 280-160-250 MG 2 PACKET: 280-160-250 PACK at 10:01

## 2018-01-21 RX ADMIN — POTASSIUM & SODIUM PHOSPHATES POWDER PACK 280-160-250 MG 2 PACKET: 280-160-250 PACK at 08:01

## 2018-01-21 RX ADMIN — FUROSEMIDE 40 MG: 10 INJECTION, SOLUTION INTRAMUSCULAR; INTRAVENOUS at 06:01

## 2018-01-21 RX ADMIN — OXYCODONE HYDROCHLORIDE 5 MG: 5 TABLET ORAL at 04:01

## 2018-01-21 RX ADMIN — DULOXETINE 60 MG: 60 CAPSULE, DELAYED RELEASE ORAL at 08:01

## 2018-01-21 RX ADMIN — FUROSEMIDE 40 MG: 10 INJECTION, SOLUTION INTRAMUSCULAR; INTRAVENOUS at 03:01

## 2018-01-21 NOTE — TREATMENT PLAN
47 y.o. female with stress hyperglycemia.    Tolerating 100% diet. No insulin requirements. BG at goal.  Resolution of hyperglycemia.  No medications required at discharge.    Rupal Colindres MD  Endocrinology Fellow     We will sign off.  Please call with any additional questions.

## 2018-01-21 NOTE — PROGRESS NOTES
[facility]  Cardiothoracic Surgery  Progress Note    Patient Name: Alondra Carrera  MRN: [id number]  Admission Date: 1/18/2018  Hospital Length of Stay: 3 days  Code Status: Full Code   Attending Physician: Randall Sorensen MD   Referring Provider: Randall Sorensen MD  Principal Problem:S/P MVR (mitral valve repair)    Subjective:     Post-Op Info:  Procedure(s) (LRB):  Mitral Valve Repair (N/A)   3 Days Post-Op     Interval History: No acute events overnight. Doing well this am, OOBTC.    Medications:  Continuous Infusions:  Scheduled Meds:   aspirin  325 mg Oral Daily    docusate sodium  100 mg Oral BID    DULoxetine  60 mg Oral Daily    famotidine  20 mg Oral BID    furosemide  40 mg Intravenous TID    metoprolol tartrate  25 mg Oral BID    montelukast  10 mg Oral Daily    polyethylene glycol  17 g Oral Daily    potassium, sodium phosphates  2 packet Oral QID (AC & HS)    simvastatin  20 mg Oral Nightly    tiotropium  1 capsule Inhalation Daily     PRN Meds:acetaminophen, albuterol-ipratropium 2.5mg-0.5mg/3mL, bisacodyl, clonazePAM, metoclopramide HCl, ondansetron, oxyCODONE     Objective:     Vital Signs (Most Recent):  Temp: 99.7 °F (37.6 °C) (01/21/18 1144)  Pulse: 89 (01/21/18 1144)  Resp: 18 (01/21/18 1144)  BP: (!) 92/56 (01/21/18 1144)  SpO2: 97 % (01/21/18 1144) Vital Signs (24h Range):  Temp:  [99.7 °F (37.6 °C)-100.2 °F (37.9 °C)] 99.7 °F (37.6 °C)  Pulse:  [] 89  Resp:  [16-20] 18  SpO2:  [94 %-98 %] 97 %  BP: ()/(55-61) 92/56     Weight: 87 kg (191 lb 12.8 oz)  Body mass index is 30.04 kg/m².    SpO2: 97 %  O2 Device (Oxygen Therapy): nasal cannula    Intake/Output - Last 3 Shifts       01/19 0700 - 01/20 0659 01/20 0700 - 01/21 0659 01/21 0700 - 01/22 0659    P.O. 780 1025 900    I.V. (mL/kg) 54 (0.6)      Blood       Other       IV Piggyback  500     Total Intake(mL/kg) 834 (8.7) 1525 (18.1) 900 (10.3)    Urine (mL/kg/hr) 1690 (0.7) 2020 (1) 825 (1.1)     Stool 0 (0) 0 (0) 0 (0)    Chest Tube 80 (0)      Total Output 1770 2020 825    Net -936 -495 +75           Stool Occurrence 0 x 0 x 0 x          Lines/Drains/Airways     Central Venous Catheter Line                 Introducer 01/18/18 0720 right internal jugular 3 days         Percutaneous Central Line Insertion/Assessment - double lumen  01/18/18  right internal jugular 3 days         Percutaneous Central Line Insertion/Assessment - triple lumen  01/18/18 1540 right internal jugular 2 days          Epidural Line                 Perineural Analgesia/Anesthesia Assessment (using dermatomes) Epidural 01/18/18 0801 3 days          Line                 Pacer Wires 01/18/18 1322 3 days          Peripheral Intravenous Line                 Peripheral IV - Single Lumen 01/18/18 0600 Left Hand 3 days         Peripheral IV - Single Lumen 01/18/18 0730 Right Hand 3 days                Physical Exam   Constitutional: She appears well-developed.   HENT:   Head: Normocephalic.   Cardiovascular: Regular rhythm.    Pulmonary/Chest: Effort normal.   Abdominal: Soft.   Neurological: She is alert.   Skin: Skin is warm and dry.       Significant Labs:  CBC:   Recent Labs  Lab 01/21/18  0600   WBC 12.45  12.45   RBC 2.92*  2.92*   HGB 7.7*  7.7*   HCT 23.9*  23.9*   *  102*   MCV 82  82   MCH 26.4*  26.4*   MCHC 32.2  32.2     CMP:   Recent Labs  Lab 01/21/18  0600   *  111*   CALCIUM 8.8  8.8     139   K 3.6  3.6   CO2 25  25     105   BUN 13  13   CREATININE 0.8  0.8     Assessment/Plan:     * S/P MVR (mitral valve repair)    POD #3:  -sternal precautions  -PT/OT  -Ambulate  -monitor rhythm  -discharge planning ongoing        Acute blood loss anemia    Monitor H&H and transfuse prn.        Hypophosphatemia    Monitor electrolytes and replace prn.        Stress hyperglycemia    accuchecks QID.  Endocrine following.            Liseth Langston MD  Cardiothoracic Surgery  [facility]  [place]

## 2018-01-21 NOTE — PLAN OF CARE
Problem: Patient Care Overview  Goal: Plan of Care Review  Plan of care discussed with patient and family.  Patient is free of fall/trauma/injury. Remained very sleepy all day and decreased pain medication. NO pain meds given on day shift. Ambulated in hallway 3 times but just to nursing station- approximately 85 feet.(too sleepy)Reinforced rt ij cordis/tlc dressing.  Denies CP, SOB, or pain/discomfort. All questions addressed. Will continue to monitor

## 2018-01-21 NOTE — PROGRESS NOTES
Ambulated in hallway the entire [place] with o2 2ln/c. Stopped only once to rest. o2 sat's remain 95% and above.  and family at bedside. Back in chair. O2 96% on 2ln/c. Weaned to RA. Family encouraged use of I.S-250

## 2018-01-21 NOTE — PLAN OF CARE
Problem: Patient Care Overview  Goal: Plan of Care Review  Outcome: Ongoing (interventions implemented as appropriate)  VSS. O2 sats stable on 2L NC. Pt denies CP, SOB, palpitations, lightheadedness and dizziness. Steri strips in place, CDI. Pacer wires isolated & secured. Fall precautions maintained this shift, pt remains free from falls, trauma and injury. POC reviewed with pt, verbalized understanding. NADN. Will continue to monitor.

## 2018-01-21 NOTE — SUBJECTIVE & OBJECTIVE
Interval History: No acute events overnight. Doing well this am, OOBTC.    Medications:  Continuous Infusions:  Scheduled Meds:   aspirin  325 mg Oral Daily    docusate sodium  100 mg Oral BID    DULoxetine  60 mg Oral Daily    famotidine  20 mg Oral BID    furosemide  40 mg Intravenous TID    metoprolol tartrate  25 mg Oral BID    montelukast  10 mg Oral Daily    polyethylene glycol  17 g Oral Daily    potassium, sodium phosphates  2 packet Oral QID (AC & HS)    simvastatin  20 mg Oral Nightly    tiotropium  1 capsule Inhalation Daily     PRN Meds:acetaminophen, albuterol-ipratropium 2.5mg-0.5mg/3mL, bisacodyl, clonazePAM, metoclopramide HCl, ondansetron, oxyCODONE     Objective:     Vital Signs (Most Recent):  Temp: 99.7 °F (37.6 °C) (01/21/18 1144)  Pulse: 89 (01/21/18 1144)  Resp: 18 (01/21/18 1144)  BP: (!) 92/56 (01/21/18 1144)  SpO2: 97 % (01/21/18 1144) Vital Signs (24h Range):  Temp:  [99.7 °F (37.6 °C)-100.2 °F (37.9 °C)] 99.7 °F (37.6 °C)  Pulse:  [] 89  Resp:  [16-20] 18  SpO2:  [94 %-98 %] 97 %  BP: ()/(55-61) 92/56     Weight: 87 kg (191 lb 12.8 oz)  Body mass index is 30.04 kg/m².    SpO2: 97 %  O2 Device (Oxygen Therapy): nasal cannula    Intake/Output - Last 3 Shifts       01/19 0700 - 01/20 0659 01/20 0700 - 01/21 0659 01/21 0700 - 01/22 0659    P.O. 780 1025 900    I.V. (mL/kg) 54 (0.6)      Blood       Other       IV Piggyback  500     Total Intake(mL/kg) 834 (8.7) 1525 (18.1) 900 (10.3)    Urine (mL/kg/hr) 1690 (0.7) 2020 (1) 825 (1.1)    Stool 0 (0) 0 (0) 0 (0)    Chest Tube 80 (0)      Total Output 1770 3397 825    Net -936 -495 +75           Stool Occurrence 0 x 0 x 0 x          Lines/Drains/Airways     Central Venous Catheter Line                 Introducer 01/18/18 0720 right internal jugular 3 days         Percutaneous Central Line Insertion/Assessment - double lumen  01/18/18  right internal jugular 3 days         Percutaneous Central Line Insertion/Assessment -  triple lumen  01/18/18 1540 right internal jugular 2 days          Epidural Line                 Perineural Analgesia/Anesthesia Assessment (using dermatomes) Epidural 01/18/18 0801 3 days          Line                 Pacer Wires 01/18/18 1322 3 days          Peripheral Intravenous Line                 Peripheral IV - Single Lumen 01/18/18 0600 Left Hand 3 days         Peripheral IV - Single Lumen 01/18/18 0730 Right Hand 3 days                Physical Exam   Constitutional: She appears well-developed.   HENT:   Head: Normocephalic.   Cardiovascular: Regular rhythm.    Pulmonary/Chest: Effort normal.   Abdominal: Soft.   Neurological: She is alert.   Skin: Skin is warm and dry.       Significant Labs:  CBC:   Recent Labs  Lab 01/21/18  0600   WBC 12.45  12.45   RBC 2.92*  2.92*   HGB 7.7*  7.7*   HCT 23.9*  23.9*   *  102*   MCV 82  82   MCH 26.4*  26.4*   MCHC 32.2  32.2     CMP:   Recent Labs  Lab 01/21/18  0600   *  111*   CALCIUM 8.8  8.8     139   K 3.6  3.6   CO2 25  25     105   BUN 13  13   CREATININE 0.8  0.8

## 2018-01-22 DIAGNOSIS — Z98.890 S/P MVR (MITRAL VALVE REPAIR): Primary | ICD-10-CM

## 2018-01-22 LAB
ANION GAP SERPL CALC-SCNC: 9 MMOL/L
APTT BLDCRRT: 28.6 SEC
BASOPHILS # BLD AUTO: 0.02 K/UL
BASOPHILS NFR BLD: 0.2 %
BUN SERPL-MCNC: 11 MG/DL
CALCIUM SERPL-MCNC: 9.1 MG/DL
CHLORIDE SERPL-SCNC: 99 MMOL/L
CO2 SERPL-SCNC: 30 MMOL/L
CREAT SERPL-MCNC: 0.7 MG/DL
DIFFERENTIAL METHOD: ABNORMAL
EOSINOPHIL # BLD AUTO: 0.1 K/UL
EOSINOPHIL NFR BLD: 0.8 %
ERYTHROCYTE [DISTWIDTH] IN BLOOD BY AUTOMATED COUNT: 13.6 %
EST. GFR  (AFRICAN AMERICAN): >60 ML/MIN/1.73 M^2
EST. GFR  (NON AFRICAN AMERICAN): >60 ML/MIN/1.73 M^2
GLUCOSE SERPL-MCNC: 98 MG/DL
HCT VFR BLD AUTO: 25 %
HGB BLD-MCNC: 8 G/DL
IMM GRANULOCYTES # BLD AUTO: 0.03 K/UL
IMM GRANULOCYTES NFR BLD AUTO: 0.3 %
INR PPP: 1
LYMPHOCYTES # BLD AUTO: 1.3 K/UL
LYMPHOCYTES NFR BLD: 14.7 %
MAGNESIUM SERPL-MCNC: 1.9 MG/DL
MCH RBC QN AUTO: 26.3 PG
MCHC RBC AUTO-ENTMCNC: 32 G/DL
MCV RBC AUTO: 82 FL
MONOCYTES # BLD AUTO: 0.7 K/UL
MONOCYTES NFR BLD: 8.3 %
NEUTROPHILS # BLD AUTO: 6.6 K/UL
NEUTROPHILS NFR BLD: 75.7 %
NRBC BLD-RTO: 0 /100 WBC
PHOSPHATE SERPL-MCNC: 3.2 MG/DL
PLATELET # BLD AUTO: 160 K/UL
PMV BLD AUTO: 11.4 FL
POTASSIUM SERPL-SCNC: 3.2 MMOL/L
PROTHROMBIN TIME: 10.1 SEC
RBC # BLD AUTO: 3.04 M/UL
SODIUM SERPL-SCNC: 138 MMOL/L
WBC # BLD AUTO: 8.7 K/UL

## 2018-01-22 PROCEDURE — 80048 BASIC METABOLIC PNL TOTAL CA: CPT

## 2018-01-22 PROCEDURE — 25000242 PHARM REV CODE 250 ALT 637 W/ HCPCS: Performed by: NURSE PRACTITIONER

## 2018-01-22 PROCEDURE — 97530 THERAPEUTIC ACTIVITIES: CPT

## 2018-01-22 PROCEDURE — 20600001 HC STEP DOWN PRIVATE ROOM

## 2018-01-22 PROCEDURE — 83735 ASSAY OF MAGNESIUM: CPT

## 2018-01-22 PROCEDURE — 25000003 PHARM REV CODE 250: Performed by: NURSE PRACTITIONER

## 2018-01-22 PROCEDURE — 97116 GAIT TRAINING THERAPY: CPT

## 2018-01-22 PROCEDURE — 63600175 PHARM REV CODE 636 W HCPCS: Performed by: NURSE PRACTITIONER

## 2018-01-22 PROCEDURE — 85610 PROTHROMBIN TIME: CPT

## 2018-01-22 PROCEDURE — 85730 THROMBOPLASTIN TIME PARTIAL: CPT

## 2018-01-22 PROCEDURE — 85025 COMPLETE CBC W/AUTO DIFF WBC: CPT

## 2018-01-22 PROCEDURE — 84100 ASSAY OF PHOSPHORUS: CPT

## 2018-01-22 RX ORDER — METOPROLOL TARTRATE 50 MG/1
50 TABLET ORAL 2 TIMES DAILY
Status: DISCONTINUED | OUTPATIENT
Start: 2018-01-22 | End: 2018-01-24

## 2018-01-22 RX ORDER — POTASSIUM CHLORIDE 20 MEQ/1
20 TABLET, EXTENDED RELEASE ORAL 2 TIMES DAILY
Status: DISCONTINUED | OUTPATIENT
Start: 2018-01-22 | End: 2018-01-24 | Stop reason: HOSPADM

## 2018-01-22 RX ORDER — POTASSIUM CHLORIDE 20 MEQ/1
60 TABLET, EXTENDED RELEASE ORAL ONCE
Status: COMPLETED | OUTPATIENT
Start: 2018-01-22 | End: 2018-01-22

## 2018-01-22 RX ADMIN — POTASSIUM & SODIUM PHOSPHATES POWDER PACK 280-160-250 MG 2 PACKET: 280-160-250 PACK at 05:01

## 2018-01-22 RX ADMIN — MONTELUKAST SODIUM 10 MG: 10 TABLET, FILM COATED ORAL at 08:01

## 2018-01-22 RX ADMIN — DOCUSATE SODIUM 100 MG: 50 CAPSULE, LIQUID FILLED ORAL at 08:01

## 2018-01-22 RX ADMIN — POTASSIUM & SODIUM PHOSPHATES POWDER PACK 280-160-250 MG 2 PACKET: 280-160-250 PACK at 06:01

## 2018-01-22 RX ADMIN — FUROSEMIDE 40 MG: 10 INJECTION, SOLUTION INTRAMUSCULAR; INTRAVENOUS at 05:01

## 2018-01-22 RX ADMIN — FAMOTIDINE 20 MG: 20 TABLET, FILM COATED ORAL at 08:01

## 2018-01-22 RX ADMIN — POTASSIUM CHLORIDE 20 MEQ: 1500 TABLET, EXTENDED RELEASE ORAL at 09:01

## 2018-01-22 RX ADMIN — POTASSIUM CHLORIDE 20 MEQ: 1500 TABLET, EXTENDED RELEASE ORAL at 08:01

## 2018-01-22 RX ADMIN — FUROSEMIDE 40 MG: 10 INJECTION, SOLUTION INTRAMUSCULAR; INTRAVENOUS at 02:01

## 2018-01-22 RX ADMIN — POTASSIUM & SODIUM PHOSPHATES POWDER PACK 280-160-250 MG 2 PACKET: 280-160-250 PACK at 11:01

## 2018-01-22 RX ADMIN — ASPIRIN 325 MG ORAL TABLET 325 MG: 325 PILL ORAL at 08:01

## 2018-01-22 RX ADMIN — SIMVASTATIN 20 MG: 20 TABLET, FILM COATED ORAL at 09:01

## 2018-01-22 RX ADMIN — DOCUSATE SODIUM 100 MG: 50 CAPSULE, LIQUID FILLED ORAL at 09:01

## 2018-01-22 RX ADMIN — POTASSIUM CHLORIDE 60 MEQ: 1500 TABLET, EXTENDED RELEASE ORAL at 08:01

## 2018-01-22 RX ADMIN — FUROSEMIDE 40 MG: 10 INJECTION, SOLUTION INTRAMUSCULAR; INTRAVENOUS at 09:01

## 2018-01-22 RX ADMIN — DULOXETINE 60 MG: 60 CAPSULE, DELAYED RELEASE ORAL at 08:01

## 2018-01-22 RX ADMIN — METOPROLOL TARTRATE 50 MG: 50 TABLET, FILM COATED ORAL at 08:01

## 2018-01-22 RX ADMIN — FAMOTIDINE 20 MG: 20 TABLET, FILM COATED ORAL at 09:01

## 2018-01-22 RX ADMIN — POTASSIUM & SODIUM PHOSPHATES POWDER PACK 280-160-250 MG 2 PACKET: 280-160-250 PACK at 09:01

## 2018-01-22 RX ADMIN — POLYETHYLENE GLYCOL 3350 17 G: 17 POWDER, FOR SOLUTION ORAL at 08:01

## 2018-01-22 RX ADMIN — METOPROLOL TARTRATE 50 MG: 50 TABLET, FILM COATED ORAL at 09:01

## 2018-01-22 RX ADMIN — TIOTROPIUM BROMIDE 18 MCG: 18 CAPSULE ORAL; RESPIRATORY (INHALATION) at 08:01

## 2018-01-22 NOTE — PHYSICIAN QUERY
PT Name: Alondra Carrera  MR #: [id number]     Physician Query Form - Documentation Clarification      CDS/: Mayra Hewitt RN, CCDS              Contact information: [email or web address]    This form is a permanent document in the medical record.     Query Date: January 22, 2018    By submitting this query, we are merely seeking further clarification of documentation. Please utilize your independent clinical judgment when addressing the question(s) below.    The Medical record reflects the following:    Supporting Clinical Findings Location in Medical Record      PMHx includes severe MR with moderate mitral valve prolapse,  pulmonary hypertension       1/18 h/p,    Diagnostic Results:   2D ECHO- outside records  -EF 65%  -atrial septal aneurysm   -Moderate MVprolapse   -Mod-sev MR  -mild TR  -PAS 41  -mind-mod SC       1/18 h/p                                                                            Doctor, Please specify diagnosis or diagnoses associated with above clinical findings.    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

## 2018-01-22 NOTE — PLAN OF CARE
Problem: Occupational Therapy Goal  Goal: Occupational Therapy Goal  Goals to be met by: 1/29/18     Patient will increase functional independence with ADLs by performing:    Feeding with [place].  UE Dressing with Supervision.  LE Dressing with Supervision.  Grooming while standing at sink with Supervision.  Toileting from toilet with Supervision for hygiene and clothing management.   Toilet transfer to toilet with Supervision.     Outcome: Ongoing (interventions implemented as appropriate)  Continue OT plan of care.

## 2018-01-22 NOTE — PT/OT/SLP PROGRESS
Physical Therapy Treatment    Patient Name:  Alondra Carrera   MRN:  [id number]    Recommendations:     Discharge Recommendations:  home with home health   Discharge Equipment Recommendations: none   Barriers to discharge: Inaccessible home (12 stairs inside)    Assessment:     Alondra Carrera is a 47 y.o. female admitted with a medical diagnosis of S/P MVR (mitral valve repair).  She presents with the following impairments/functional limitations:  weakness, impaired endurance, impaired self care skills, impaired functional mobilty, gait instability, impaired balance, impaired cardiopulmonary response to activity . Pt jorge session well w/ good participation. She was able to slightly inc amb distance today. Pt is progressing well but remains limited by decreased endurance. She will continue to benefit from skilled Pt.    Rehab Prognosis:  Good; patient would benefit from acute skilled PT services to address these deficits and reach maximum level of function.      Recent Surgery: Procedure(s) (LRB):  Mitral Valve Repair (N/A) 4 Days Post-Op    Plan:     During this hospitalization, patient to be seen 5 x/week to address the above listed problems via gait training, therapeutic activities, therapeutic exercises  · Plan of Care Expires:  02/17/18   Plan of Care Reviewed with: patient    Subjective     Communicated with nursing prior to session.  Patient found sitting in bedside chair upon PT entry to room, agreeable to treatment.      Chief Complaint: weakness  Patient comments/goals: to get stronger  Pain/Comfort:  · Pain Rating 1: 0/10    Patients cultural, spiritual, Anabaptism conflicts given the current situation: none reported    Objective:     Patient found with: central line, telemetry     General Precautions: Standard, fall, sternal   Orthopedic Precautions:N/A   Braces: N/A     Functional Mobility:  · Transfers:     · Sit to Stand:  contact guard assistance with no AD  · Cueing for sternal precautions  · Gait:    · 100ft w/o AD w/ CGA for safety, limited in distance by fatigue  · Pt using rodriguez rail at times for stability      AM-PAC 6 CLICK MOBILITY  Turning over in bed (including adjusting bedclothes, sheets and blankets)?: 3  Sitting down on and standing up from a chair with arms (e.g., wheelchair, bedside commode, etc.): 3  Moving from lying on back to sitting on the side of the bed?: 3  Moving to and from a bed to a chair (including a wheelchair)?: 3  Need to walk in hospital room?: 3  Climbing 3-5 steps with a railing?: 2  Total Score: 17       Therapeutic Activities and Exercises:   Seated BLE therex 2x10 reps (GS, HF, LAQ, AP)  Pt unable to recall sternal precautions at beginning of session. PT educated pt on precautions and pt verb understanding.    Patient left up in chair with all lines intact, call button in reach and  present..    GOALS:    Physical Therapy Goals        Problem: Physical Therapy Goal    Goal Priority Disciplines Outcome Goal Variances Interventions   Physical Therapy Goal     PT/OT, PT Ongoing (interventions implemented as appropriate)     Description:  Goals to be met by: 18    Patient will increase functional independence with mobility by performin. Supine to sit with Stand-by Assistance - not met  2. Sit to stand transfer with Supervision - not met  3. Gait  x 220 feet with Supervision - not met  4. Ascend/descend 12 stair with right Handrails Contact Guard Assistance - not met                      Time Tracking:     PT Received On: 18  PT Start Time: 1427     PT Stop Time: 1442  PT Total Time (min): 15 min     Billable Minutes: Gait Training 15 and Total Time 15    Treatment Type: Treatment  PT/PTA: PT     PTA Visit Number: 0     Velia Pa, CLAIR  2018

## 2018-01-22 NOTE — PROGRESS NOTES
[facility]  Cardiothoracic Surgery  Progress Note    Patient Name: Alondra Carrera  MRN: [id number]  Admission Date: 1/18/2018  Hospital Length of Stay: 4 days  Code Status: Full Code   Attending Physician: Randall Sorensen MD   Referring Provider: Randall Sorensen MD  Principal Problem:S/P MVR (mitral valve repair)    Subjective:     Post-Op Info:  Procedure(s) (LRB):  Mitral Valve Repair (N/A)   4 Days Post-Op     Interval History: No acute events overnight. NSR on monitor. Patient appears weak, states she feels tired.      Medications:  Continuous Infusions:  Scheduled Meds:   aspirin  325 mg Oral Daily    docusate sodium  100 mg Oral BID    DULoxetine  60 mg Oral Daily    famotidine  20 mg Oral BID    furosemide  40 mg Intravenous TID    metoprolol tartrate  50 mg Oral BID    montelukast  10 mg Oral Daily    polyethylene glycol  17 g Oral Daily    potassium chloride  20 mEq Oral BID    potassium, sodium phosphates  2 packet Oral QID (AC & HS)    simvastatin  20 mg Oral Nightly    tiotropium  1 capsule Inhalation Daily     PRN Meds:acetaminophen, albuterol-ipratropium 2.5mg-0.5mg/3mL, bisacodyl, clonazePAM, metoclopramide HCl, ondansetron, oxyCODONE     Objective:     Vital Signs (Most Recent):  Temp: 99.5 °F (37.5 °C) (01/22/18 1114)  Pulse: 97 (01/22/18 1458)  Resp: 16 (01/22/18 1114)  BP: (!) 90/53 (01/22/18 1114)  SpO2: 99 % (01/22/18 1114) Vital Signs (24h Range):  Temp:  [98.6 °F (37 °C)-99.6 °F (37.6 °C)] 99.5 °F (37.5 °C)  Pulse:  [] 97  Resp:  [16-20] 16  SpO2:  [93 %-100 %] 99 %  BP: ()/(53-63) 90/53     Weight: 84.5 kg (186 lb 4.6 oz)  Body mass index is 29.18 kg/m².    SpO2: 99 %  O2 Device (Oxygen Therapy): room air    Intake/Output - Last 3 Shifts       01/20 0700 - 01/21 0659 01/21 0700 - 01/22 0659 01/22 0700 - 01/23 0659    P.O. 1025 1860     I.V. (mL/kg)       IV Piggyback 500      Total Intake(mL/kg) 1525 (18.1) 1860 (21.4)     Urine (mL/kg/hr)  2020 (1) 2475 (1.2)     Stool 0 (0) 0 (0)     Chest Tube       Total Output 2020 2475      Net -495 -615             Stool Occurrence 0 x 0 x           Lines/Drains/Airways     Central Venous Catheter Line                 Introducer 01/18/18 0720 right internal jugular 4 days         Percutaneous Central Line Insertion/Assessment - double lumen  01/18/18  right internal jugular 4 days         Percutaneous Central Line Insertion/Assessment - triple lumen  01/18/18 1540 right internal jugular 3 days          Epidural Line                 Perineural Analgesia/Anesthesia Assessment (using dermatomes) Epidural 01/18/18 0801 4 days          Line                 Pacer Wires 01/18/18 1322 4 days          Peripheral Intravenous Line                 Peripheral IV - Single Lumen 01/18/18 0600 Left Hand 4 days         Peripheral IV - Single Lumen 01/18/18 0730 Right Hand 4 days         Peripheral IV - Single Lumen 01/22/18 1345 Left Antecubital less than 1 day                Physical Exam   Constitutional: She is oriented to person, place, and time. She appears well-developed and well-nourished.   HENT:   Head: Normocephalic and atraumatic.   Eyes: Pupils are equal, round, and reactive to light.   Neck: Normal range of motion. Neck supple.   Cardiovascular: Normal rate, regular rhythm and normal heart sounds.    Pulmonary/Chest: Effort normal and breath sounds normal.   Abdominal: Soft. Bowel sounds are normal.   Musculoskeletal: Normal range of motion.   Neurological: She is alert and oriented to person, place, and time.   Skin: Skin is warm and dry.   Psychiatric: She has a normal mood and affect. Her behavior is normal.       Significant Labs:  CBC:   Recent Labs  Lab 01/22/18  0500   WBC 8.70   RBC 3.04*   HGB 8.0*   HCT 25.0*      MCV 82   MCH 26.3*   MCHC 32.0     CMP:   Recent Labs  Lab 01/22/18  0500   GLU 98   CALCIUM 9.1      K 3.2*   CO2 30*   CL 99   BUN 11   CREATININE 0.7       Significant  Diagnostics:  CXR: I have reviewed all pertinent results/findings within the past 24 hours    Assessment/Plan:     * S/P MVR (mitral valve repair)    POD #3 MVr- continue ASA, BB ( increased to 50 BID), statin  -sternal precautions  -PT/OT  -Ambulate  -monitor rhythm    DISPO: -discharge planning ongoing for possibly Wednesday with home health. Will need a pre DC echo.        Acute blood loss anemia    Monitor H&H and transfuse prn.        Hypophosphatemia    Monitor electrolytes and replace prn.        Stress hyperglycemia    accuchecks QID.  Endocrine following.            Ava Alcantara NP  Cardiothoracic Surgery  [facility]

## 2018-01-22 NOTE — PLAN OF CARE
Problem: Patient Care Overview  Goal: Plan of Care Review  Outcome: Ongoing (interventions implemented as appropriate)  VSS. O2 sats stable on RA. Pt denies CP, SOB, palpitations, lightheadedness and dizziness. Steri strips in place, CDI. Pacer wires isolated & secured. Fall precautions maintained this shift, pt remains free from falls, trauma and injury. POC reviewed with pt, verbalized understanding. NADN. Will continue to monitor.

## 2018-01-22 NOTE — ASSESSMENT & PLAN NOTE
POD #3 MVr- continue ASA, BB ( increased to 50 BID), statin  -sternal precautions  -PT/OT  -Ambulate  -monitor rhythm    DISPO: -discharge planning ongoing for possibly Wednesday with home health. Will need a pre DC echo.

## 2018-01-22 NOTE — PROGRESS NOTES
Central line removed with patient lying flat. Patient tolerated well. Catheter tip intact. Pressure applied at site for five minutes. Patient instructed to continue lying flat until 1545 (30 minutes). Questions and concerns addressed. Will continue to monitor.

## 2018-01-22 NOTE — PLAN OF CARE
Problem: Physical Therapy Goal  Goal: Physical Therapy Goal  Goals to be met by: 18    Patient will increase functional independence with mobility by performin. Supine to sit with Stand-by Assistance - not met  2. Sit to stand transfer with Supervision - not met  3. Gait  x 220 feet with Supervision - not met  4. Ascend/descend 12 stair with right Handrails Contact Guard Assistance - not met     Outcome: Ongoing (interventions implemented as appropriate)  LTGs remain appropriate. Pt will continue PT POC.    Velia Pa, PT  2018

## 2018-01-22 NOTE — SUBJECTIVE & OBJECTIVE
Interval History: No acute events overnight. NSR on monitor. Patient appears weak, states she feels tired.      Medications:  Continuous Infusions:  Scheduled Meds:   aspirin  325 mg Oral Daily    docusate sodium  100 mg Oral BID    DULoxetine  60 mg Oral Daily    famotidine  20 mg Oral BID    furosemide  40 mg Intravenous TID    metoprolol tartrate  50 mg Oral BID    montelukast  10 mg Oral Daily    polyethylene glycol  17 g Oral Daily    potassium chloride  20 mEq Oral BID    potassium, sodium phosphates  2 packet Oral QID (AC & HS)    simvastatin  20 mg Oral Nightly    tiotropium  1 capsule Inhalation Daily     PRN Meds:acetaminophen, albuterol-ipratropium 2.5mg-0.5mg/3mL, bisacodyl, clonazePAM, metoclopramide HCl, ondansetron, oxyCODONE     Objective:     Vital Signs (Most Recent):  Temp: 99.5 °F (37.5 °C) (01/22/18 1114)  Pulse: 97 (01/22/18 1458)  Resp: 16 (01/22/18 1114)  BP: (!) 90/53 (01/22/18 1114)  SpO2: 99 % (01/22/18 1114) Vital Signs (24h Range):  Temp:  [98.6 °F (37 °C)-99.6 °F (37.6 °C)] 99.5 °F (37.5 °C)  Pulse:  [] 97  Resp:  [16-20] 16  SpO2:  [93 %-100 %] 99 %  BP: ()/(53-63) 90/53     Weight: 84.5 kg (186 lb 4.6 oz)  Body mass index is 29.18 kg/m².    SpO2: 99 %  O2 Device (Oxygen Therapy): room air    Intake/Output - Last 3 Shifts       01/20 0700 - 01/21 0659 01/21 0700 - 01/22 0659 01/22 0700 - 01/23 0659    P.O. 1025 1860     I.V. (mL/kg)       IV Piggyback 500      Total Intake(mL/kg) 1525 (18.1) 1860 (21.4)     Urine (mL/kg/hr) 2020 (1) 2475 (1.2)     Stool 0 (0) 0 (0)     Chest Tube       Total Output 2020 2475      Net -495 -615             Stool Occurrence 0 x 0 x           Lines/Drains/Airways     Central Venous Catheter Line                 Introducer 01/18/18 0720 right internal jugular 4 days         Percutaneous Central Line Insertion/Assessment - double lumen  01/18/18  right internal jugular 4 days         Percutaneous Central Line Insertion/Assessment -  triple lumen  01/18/18 1540 right internal jugular 3 days          Epidural Line                 Perineural Analgesia/Anesthesia Assessment (using dermatomes) Epidural 01/18/18 0801 4 days          Line                 Pacer Wires 01/18/18 1322 4 days          Peripheral Intravenous Line                 Peripheral IV - Single Lumen 01/18/18 0600 Left Hand 4 days         Peripheral IV - Single Lumen 01/18/18 0730 Right Hand 4 days         Peripheral IV - Single Lumen 01/22/18 1345 Left Antecubital less than 1 day                Physical Exam   Constitutional: She is oriented to person, place, and time. She appears well-developed and well-nourished.   HENT:   Head: Normocephalic and atraumatic.   Eyes: Pupils are equal, round, and reactive to light.   Neck: Normal range of motion. Neck supple.   Cardiovascular: Normal rate, regular rhythm and normal heart sounds.    Pulmonary/Chest: Effort normal and breath sounds normal.   Abdominal: Soft. Bowel sounds are normal.   Musculoskeletal: Normal range of motion.   Neurological: She is alert and oriented to person, place, and time.   Skin: Skin is warm and dry.   Psychiatric: She has a normal mood and affect. Her behavior is normal.       Significant Labs:  CBC:   Recent Labs  Lab 01/22/18  0500   WBC 8.70   RBC 3.04*   HGB 8.0*   HCT 25.0*      MCV 82   MCH 26.3*   MCHC 32.0     CMP:   Recent Labs  Lab 01/22/18  0500   GLU 98   CALCIUM 9.1      K 3.2*   CO2 30*   CL 99   BUN 11   CREATININE 0.7       Significant Diagnostics:  CXR: I have reviewed all pertinent results/findings within the past 24 hours

## 2018-01-22 NOTE — PT/OT/SLP PROGRESS
Occupational Therapy   Treatment    Name: Alondra Carrera  MRN: [id number]  Admitting Diagnosis:  S/P MVR (mitral valve repair)  4 Days Post-Op    Recommendations:     Discharge Recommendations: home  Discharge Equipment Recommendations:  none  Barriers to discharge:  None    Subjective     Communicated with: RN prior to session.   Pain/Comfort:  · Pain Rating 1: 0/10  · Pain Rating Post-Intervention 1: 0/10    Objective:     Patient found with: telemetry, peripheral IV    General Precautions: Standard, fall, sternal   Orthopedic Precautions:N/A   Braces: N/A     Occupational Performance:    Bed Mobility:    · NT as pt up in chair    Functional Mobility/Transfers:  · Patient completed Sit <> Stand Transfer with stand by assistance with no assistive device from bedside chair     Activities of Daily Living:  · Pt declined    Patient left up in chair with all lines intact and call button in reach    [facility] 6 Click:  [facility] Total Score: 19    Treatment & Education:  Pt up in chair, had difficulty staying awake when conversing; reviewed sternal precautions and required cues to maintain during T/Fs; SBA for T/F from bedside chair and performed functional mobility in hallway household distance with SBA, returned to room due to c/o feeling lightheaded; returned to bedside chair and resolved  Education:    Assessment:     Alondra Carrera is a 47 y.o. female with a medical diagnosis of S/P MVR (mitral valve repair).  She presents with performance deficits including weakness, impaired endurance, impaired self care skills, impaired functional mobilty, impaired cardiopulmonary response to activity. Pt with limited participation this date due to feeling lightheaded during ambulation. Pt would continue to benefit from OT to increase independence.    Rehab Prognosis:  Good; patient would benefit from acute skilled OT services to address these deficits and reach maximum level of function.       Plan:     Patient to be seen 4 x/week to  address the above listed problems via self-care/home management, therapeutic activities, therapeutic exercises  · Plan of Care Expires: 02/18/18  · Plan of Care Reviewed with: patient    This Plan of care has been discussed with the patient who was involved in its development and understands and is in agreement with the identified goals and treatment plan    GOALS:    Occupational Therapy Goals        Problem: Occupational Therapy Goal    Goal Priority Disciplines Outcome Interventions   Occupational Therapy Goal     OT, PT/OT Ongoing (interventions implemented as appropriate)    Description:  Goals to be met by: 1/29/18     Patient will increase functional independence with ADLs by performing:    Feeding with [place].  UE Dressing with Supervision.  LE Dressing with Supervision.  Grooming while standing at sink with Supervision.  Toileting from toilet with Supervision for hygiene and clothing management.   Toilet transfer to toilet with Supervision.                      Time Tracking:     OT Date of Treatment: 01/22/18  OT Start Time: 1038  OT Stop Time: 1047  OT Total Time (min): 9 min    Billable Minutes:Therapeutic Activity 9 minutes    ARIANA Campuzano  1/22/2018

## 2018-01-23 LAB
ANION GAP SERPL CALC-SCNC: 12 MMOL/L
BASOPHILS # BLD AUTO: 0.04 K/UL
BASOPHILS NFR BLD: 0.5 %
BUN SERPL-MCNC: 12 MG/DL
CALCIUM SERPL-MCNC: 9.5 MG/DL
CHLORIDE SERPL-SCNC: 100 MMOL/L
CO2 SERPL-SCNC: 27 MMOL/L
CREAT SERPL-MCNC: 0.8 MG/DL
DIFFERENTIAL METHOD: ABNORMAL
EOSINOPHIL # BLD AUTO: 0.1 K/UL
EOSINOPHIL NFR BLD: 0.6 %
ERYTHROCYTE [DISTWIDTH] IN BLOOD BY AUTOMATED COUNT: 13.2 %
EST. GFR  (AFRICAN AMERICAN): >60 ML/MIN/1.73 M^2
EST. GFR  (NON AFRICAN AMERICAN): >60 ML/MIN/1.73 M^2
GLUCOSE SERPL-MCNC: 97 MG/DL
HCT VFR BLD AUTO: 27.9 %
HGB BLD-MCNC: 8.9 G/DL
IMM GRANULOCYTES # BLD AUTO: 0.04 K/UL
IMM GRANULOCYTES NFR BLD AUTO: 0.5 %
LYMPHOCYTES # BLD AUTO: 1.3 K/UL
LYMPHOCYTES NFR BLD: 14.8 %
MAGNESIUM SERPL-MCNC: 2.1 MG/DL
MCH RBC QN AUTO: 26.2 PG
MCHC RBC AUTO-ENTMCNC: 31.9 G/DL
MCV RBC AUTO: 82 FL
MONOCYTES # BLD AUTO: 0.8 K/UL
MONOCYTES NFR BLD: 8.8 %
NEUTROPHILS # BLD AUTO: 6.5 K/UL
NEUTROPHILS NFR BLD: 74.8 %
NRBC BLD-RTO: 0 /100 WBC
PHOSPHATE SERPL-MCNC: 3.5 MG/DL
PLATELET # BLD AUTO: 257 K/UL
PMV BLD AUTO: 10.3 FL
POTASSIUM SERPL-SCNC: 3.9 MMOL/L
RBC # BLD AUTO: 3.4 M/UL
SODIUM SERPL-SCNC: 139 MMOL/L
WBC # BLD AUTO: 8.72 K/UL

## 2018-01-23 PROCEDURE — 25000003 PHARM REV CODE 250: Performed by: NURSE PRACTITIONER

## 2018-01-23 PROCEDURE — 84100 ASSAY OF PHOSPHORUS: CPT

## 2018-01-23 PROCEDURE — 93005 ELECTROCARDIOGRAM TRACING: CPT

## 2018-01-23 PROCEDURE — 93010 ELECTROCARDIOGRAM REPORT: CPT | Mod: ,,, | Performed by: INTERNAL MEDICINE

## 2018-01-23 PROCEDURE — 85025 COMPLETE CBC W/AUTO DIFF WBC: CPT

## 2018-01-23 PROCEDURE — 63600175 PHARM REV CODE 636 W HCPCS: Performed by: NURSE PRACTITIONER

## 2018-01-23 PROCEDURE — 97530 THERAPEUTIC ACTIVITIES: CPT

## 2018-01-23 PROCEDURE — 83735 ASSAY OF MAGNESIUM: CPT

## 2018-01-23 PROCEDURE — 20600001 HC STEP DOWN PRIVATE ROOM

## 2018-01-23 PROCEDURE — 36415 COLL VENOUS BLD VENIPUNCTURE: CPT

## 2018-01-23 PROCEDURE — 97116 GAIT TRAINING THERAPY: CPT

## 2018-01-23 PROCEDURE — 80048 BASIC METABOLIC PNL TOTAL CA: CPT

## 2018-01-23 RX ORDER — FUROSEMIDE 10 MG/ML
40 INJECTION INTRAMUSCULAR; INTRAVENOUS 2 TIMES DAILY
Status: DISCONTINUED | OUTPATIENT
Start: 2018-01-23 | End: 2018-01-24

## 2018-01-23 RX ADMIN — DULOXETINE 60 MG: 60 CAPSULE, DELAYED RELEASE ORAL at 09:01

## 2018-01-23 RX ADMIN — POTASSIUM & SODIUM PHOSPHATES POWDER PACK 280-160-250 MG 2 PACKET: 280-160-250 PACK at 06:01

## 2018-01-23 RX ADMIN — MONTELUKAST SODIUM 10 MG: 10 TABLET, FILM COATED ORAL at 09:01

## 2018-01-23 RX ADMIN — FAMOTIDINE 20 MG: 20 TABLET, FILM COATED ORAL at 09:01

## 2018-01-23 RX ADMIN — ACETAMINOPHEN 650 MG: 325 TABLET ORAL at 08:01

## 2018-01-23 RX ADMIN — FAMOTIDINE 20 MG: 20 TABLET, FILM COATED ORAL at 08:01

## 2018-01-23 RX ADMIN — POTASSIUM CHLORIDE 20 MEQ: 1500 TABLET, EXTENDED RELEASE ORAL at 09:01

## 2018-01-23 RX ADMIN — FUROSEMIDE 40 MG: 10 INJECTION, SOLUTION INTRAMUSCULAR; INTRAVENOUS at 06:01

## 2018-01-23 RX ADMIN — POTASSIUM & SODIUM PHOSPHATES POWDER PACK 280-160-250 MG 2 PACKET: 280-160-250 PACK at 05:01

## 2018-01-23 RX ADMIN — METOPROLOL TARTRATE 50 MG: 50 TABLET, FILM COATED ORAL at 08:01

## 2018-01-23 RX ADMIN — SIMVASTATIN 20 MG: 20 TABLET, FILM COATED ORAL at 08:01

## 2018-01-23 RX ADMIN — POTASSIUM & SODIUM PHOSPHATES POWDER PACK 280-160-250 MG 2 PACKET: 280-160-250 PACK at 12:01

## 2018-01-23 RX ADMIN — METOPROLOL TARTRATE 50 MG: 50 TABLET, FILM COATED ORAL at 09:01

## 2018-01-23 RX ADMIN — ASPIRIN 325 MG ORAL TABLET 325 MG: 325 PILL ORAL at 09:01

## 2018-01-23 RX ADMIN — POTASSIUM & SODIUM PHOSPHATES POWDER PACK 280-160-250 MG 2 PACKET: 280-160-250 PACK at 08:01

## 2018-01-23 RX ADMIN — FUROSEMIDE 40 MG: 10 INJECTION, SOLUTION INTRAMUSCULAR; INTRAVENOUS at 05:01

## 2018-01-23 RX ADMIN — POTASSIUM CHLORIDE 20 MEQ: 1500 TABLET, EXTENDED RELEASE ORAL at 08:01

## 2018-01-23 NOTE — PROGRESS NOTES
"[facility]  Adult Nutrition  Progress Note    SUMMARY     Recommendations    Recommendation/Intervention:   1. Continue current diet with Boost Plus.  2. Encourage PO intake. Appetite is poor currently, but expect to improve in the next few days.   3. RD following.    Goals: Meet % EEN, EPN  Nutrition Goal Status: progressing towards goal  Communication of RD Recs: reviewed with RN    Reason for Assessment    Reason for Assessment: RD follow-up  Diagnosis: other (see comments) (S/p MVR)  Relevant Medical History: HTN   Interdisciplinary Rounds: did not attend     General Information Comments: Diet advaced to cardiac diet 1/20. Pt states she has little appetite and equates that to eating 25% of tray at meals. Pt started Boost Plus Strawberry 1/22 and states she drinks a little at a time and it makes her stomach upset. RD recommended eating food with the Boost to ease stomach upset. Pt reports no weight loss prior to visit and states a UBW of 190 lbs. Pt reports she is "a little hungry" at time of visit.    Nutrition Discharge Planning: Adequate PO intake    Nutrition Prescription Ordered    Current Diet Order: Cardiac Diet. Fluid restriction:: Fluid - 1500 mL                    Evaluation of Received Nutrients/Fluid Intake                                                                                                  % Intake of Estimated Energy Needs: 25 - 50 %  % Meal Intake: 25%    Nutrition/Diet History    Patient Reported Diet/Restrictions/Preferences: general, no cultural/Episcopal preferences noted.        Factors Affecting Nutritional Intake: decreased appetite                Labs/Tests/Procedures/Meds       Pertinent Labs Reviewed: reviewed, pertinent  Pertinent Labs Comments: Stable  Pertinent Medications Reviewed: reviewed, pertinent  Pertinent Medications Comments: Statin, D5, Insulin    Physical Findings    Overall Physical Appearance: lethargic, overweight, weak     Oral/Mouth " "Cavity: WDL  Skin: incision (Chest)    Anthropometrics    Temp: 99.3 °F (37.4 °C)     Height: 5' 7" (170.2 cm)  Weight Method: Standard Scale  Weight: 83.7 kg (184 lb 8.4 oz)     Ideal Body Weight (IBW), Female: 135 lb     % Ideal Body Weight, Female (lb): 157.1 lb  BMI (Calculated): 33.3  BMI Grade: 30 - 34.9- obesity - grade I                            Estimated/Assessed Needs    Weight Used For Calorie Calculations: 96.2 kg (212 lb 1.3 oz)      Energy Calorie Requirements (kcal): 2036 kcal/d  Energy Need Method: Taft-St Jeor (1.25 PAL)     RMR (Taft-St. Jeor Equation): 1629.62        Weight Used For Protein Calculations: 96.2 kg (212 lb 1.3 oz)  Protein Requirements: 96 g/d (1 g/kg)     Fluid Need Method: RDA Method, other (see comments) (Per MD or 1 mL/kcal)        RDA Method (mL): 2036               Assessment and Plan    * S/P MVR (mitral valve repair)      Nutrition Problem  Increased nutrient needs    Related to (etiology):   Physiological causes    Signs and Symptoms (as evidenced by):   S/p cardiac surgery     Interventions/Recommendations (treatment strategy):  See recs.    Nutrition Diagnosis Status:   Continues                Monitor and Evaluation    Food and Nutrient Intake: energy intake, food and beverage intake  Food and Nutrient Adminstration: diet order     Physical Activity and Function: nutrition-related ADLs and IADLs  Anthropometric Measurements: weight, weight change  Biochemical Data, Medical Tests and Procedures: lipid profile, inflammatory profile, glucose/endocrine profile, gastrointestinal profile, electrolyte and renal panel  Nutrition-Focused Physical Findings: overall appearance    Nutrition Risk    Level of Risk: other (see comments) (2x/week)    Nutrition Follow-Up    RD Follow-up?: Yes     Velia Hagan, Dietetic Intern    Jaentt Hoover, JUANA/CESAR  "

## 2018-01-23 NOTE — PLAN OF CARE
Problem: Patient Care Overview  Goal: Plan of Care Review    Recommendations     Recommendation/Intervention:   1. Continue current diet with Boost Plus.  2. Encourage PO intake. Appetite is poor currently, but expect to improve in the next few days.   3. RD following.     Goals: Meet % EEN, EPN  Nutrition Goal Status: progressing towards goal

## 2018-01-23 NOTE — ASSESSMENT & PLAN NOTE
Nutrition Problem  Increased nutrient needs    Related to (etiology):   Physiological causes    Signs and Symptoms (as evidenced by):   S/p cardiac surgery     Interventions/Recommendations (treatment strategy):  See recs.    Nutrition Diagnosis Status:   Continues

## 2018-01-23 NOTE — ASSESSMENT & PLAN NOTE
POD #5 MVr- continue ASA, BB, statin  -sternal precautions  -PT/OT  -Ambulate  -monitor rhythm    DISPO: discharge planning ongoing for possibly Wednesday with home health. Will need a pre DC echo.

## 2018-01-23 NOTE — PROGRESS NOTES
[facility]  Cardiothoracic Surgery  Progress Note    Patient Name: Alondra Carrera  MRN: [id number]  Admission Date: 1/18/2018  Hospital Length of Stay: 5 days  Code Status: Full Code   Attending Physician: Randall Sorensen MD   Referring Provider: Randall Sorensen MD  Principal Problem:S/P MVR (mitral valve repair)    Subjective:     Post-Op Info:  Procedure(s) (LRB):  Mitral Valve Repair (N/A)   5 Days Post-Op     Interval History: No acute events overnight. NSR on monitor. Patient states she is very tired.      Medications:  Continuous Infusions:  Scheduled Meds:   aspirin  325 mg Oral Daily    docusate sodium  100 mg Oral BID    DULoxetine  60 mg Oral Daily    famotidine  20 mg Oral BID    furosemide  40 mg Intravenous BID    metoprolol tartrate  50 mg Oral BID    montelukast  10 mg Oral Daily    polyethylene glycol  17 g Oral Daily    potassium chloride  20 mEq Oral BID    potassium, sodium phosphates  2 packet Oral QID (AC & HS)    simvastatin  20 mg Oral Nightly    tiotropium  1 capsule Inhalation Daily     PRN Meds:acetaminophen, albuterol-ipratropium 2.5mg-0.5mg/3mL, bisacodyl, clonazePAM, metoclopramide HCl, ondansetron, oxyCODONE     Objective:     Vital Signs (Most Recent):  Temp: 99.3 °F (37.4 °C) (01/23/18 0759)  Pulse: 110 (01/23/18 1502)  Resp: 18 (01/23/18 0759)  BP: (!) 98/58 (01/23/18 0759)  SpO2: 96 % (01/23/18 0759) Vital Signs (24h Range):  Temp:  [98.5 °F (36.9 °C)-99.3 °F (37.4 °C)] 99.3 °F (37.4 °C)  Pulse:  [] 110  Resp:  [18-19] 18  SpO2:  [93 %-98 %] 96 %  BP: ()/(56-64) 98/58     Weight: 83.7 kg (184 lb 8.4 oz)  Body mass index is 28.9 kg/m².    SpO2: 96 %  O2 Device (Oxygen Therapy): room air    Intake/Output - Last 3 Shifts       01/21 0700 - 01/22 0659 01/22 0700 - 01/23 0659 01/23 0700 - 01/24 0659    P.O. 1860 1140 480    IV Piggyback       Total Intake(mL/kg) 1860 (21.4) 1140 (13.5) 480 (5.7)    Urine (mL/kg/hr) 2475 (1.2) 2750 (1.4)  900 (1.1)    Stool 0 (0) 0 (0) 0 (0)    Total Output 8393 2552 392    Net -615 -1610 -420           Urine Occurrence  1 x     Stool Occurrence 0 x 1 x           Lines/Drains/Airways     Central Venous Catheter Line                 Introducer 01/18/18 0720 right internal jugular 5 days          Epidural Line                 Perineural Analgesia/Anesthesia Assessment (using dermatomes) Epidural 01/18/18 0801 5 days          Line                 Pacer Wires 01/18/18 1322 5 days          Peripheral Intravenous Line                 Peripheral IV - Single Lumen 01/18/18 0730 Right Hand 5 days         Peripheral IV - Single Lumen 01/22/18 1519 Right Antecubital 1 day                Physical Exam   Constitutional: She is oriented to person, place, and time. She appears well-developed and well-nourished.   HENT:   Head: Normocephalic and atraumatic.   Eyes: Pupils are equal, round, and reactive to light.   Neck: Normal range of motion. Neck supple.   Cardiovascular: Normal rate, regular rhythm and normal heart sounds.    Pulmonary/Chest: Effort normal and breath sounds normal.   Abdominal: Soft. Bowel sounds are normal.   Musculoskeletal: Normal range of motion.   Neurological: She is alert and oriented to person, place, and time.   Skin: Skin is warm and dry.   Psychiatric: She has a normal mood and affect. Her behavior is normal.       Significant Labs:  BMP:   Recent Labs  Lab 01/23/18  0838   GLU 97      K 3.9      CO2 27   BUN 12   CREATININE 0.8   CALCIUM 9.5   MG 2.1     CBC:   Recent Labs  Lab 01/23/18  0838   WBC 8.72   RBC 3.40*   HGB 8.9*   HCT 27.9*      MCV 82   MCH 26.2*   MCHC 31.9*     Coagulation:   Recent Labs  Lab 01/22/18  0500   INR 1.0   APTT 28.6       Significant Diagnostics:  CXR: I have reviewed all pertinent results/findings within the past 24 hours    Assessment/Plan:     * S/P MVR (mitral valve repair)    POD #5 MVr- continue ASA, BB, statin  -sternal  precautions  -PT/OT  -Ambulate  -monitor rhythm    DISPO: discharge planning ongoing for possibly Wednesday with home health. Will need a pre DC echo.        Acute blood loss anemia    Monitor H&H and transfuse prn.        Hypophosphatemia    Monitor electrolytes and replace prn.        Stress hyperglycemia    accuchecks QID.  Endocrine following.            Ava Alcantara NP  Cardiothoracic Surgery  [facility]

## 2018-01-23 NOTE — SUBJECTIVE & OBJECTIVE
Interval History: No acute events overnight. NSR on monitor. Patient states she is very tired.      Medications:  Continuous Infusions:  Scheduled Meds:   aspirin  325 mg Oral Daily    docusate sodium  100 mg Oral BID    DULoxetine  60 mg Oral Daily    famotidine  20 mg Oral BID    furosemide  40 mg Intravenous BID    metoprolol tartrate  50 mg Oral BID    montelukast  10 mg Oral Daily    polyethylene glycol  17 g Oral Daily    potassium chloride  20 mEq Oral BID    potassium, sodium phosphates  2 packet Oral QID (AC & HS)    simvastatin  20 mg Oral Nightly    tiotropium  1 capsule Inhalation Daily     PRN Meds:acetaminophen, albuterol-ipratropium 2.5mg-0.5mg/3mL, bisacodyl, clonazePAM, metoclopramide HCl, ondansetron, oxyCODONE     Objective:     Vital Signs (Most Recent):  Temp: 99.3 °F (37.4 °C) (01/23/18 0759)  Pulse: 110 (01/23/18 1502)  Resp: 18 (01/23/18 0759)  BP: (!) 98/58 (01/23/18 0759)  SpO2: 96 % (01/23/18 0759) Vital Signs (24h Range):  Temp:  [98.5 °F (36.9 °C)-99.3 °F (37.4 °C)] 99.3 °F (37.4 °C)  Pulse:  [] 110  Resp:  [18-19] 18  SpO2:  [93 %-98 %] 96 %  BP: ()/(56-64) 98/58     Weight: 83.7 kg (184 lb 8.4 oz)  Body mass index is 28.9 kg/m².    SpO2: 96 %  O2 Device (Oxygen Therapy): room air    Intake/Output - Last 3 Shifts       01/21 0700 - 01/22 0659 01/22 0700 - 01/23 0659 01/23 0700 - 01/24 0659    P.O. 1860 1140 480    IV Piggyback       Total Intake(mL/kg) 1860 (21.4) 1140 (13.5) 480 (5.7)    Urine (mL/kg/hr) 2475 (1.2) 2750 (1.4) 900 (1.1)    Stool 0 (0) 0 (0) 0 (0)    Total Output 2475 2750 900    Net -615 -1610 -420           Urine Occurrence  1 x     Stool Occurrence 0 x 1 x           Lines/Drains/Airways     Central Venous Catheter Line                 Introducer 01/18/18 0720 right internal jugular 5 days          Epidural Line                 Perineural Analgesia/Anesthesia Assessment (using dermatomes) Epidural 01/18/18 0801 5 days          Line                  Pacer Wires 01/18/18 1322 5 days          Peripheral Intravenous Line                 Peripheral IV - Single Lumen 01/18/18 0730 Right Hand 5 days         Peripheral IV - Single Lumen 01/22/18 1519 Right Antecubital 1 day                Physical Exam   Constitutional: She is oriented to person, place, and time. She appears well-developed and well-nourished.   HENT:   Head: Normocephalic and atraumatic.   Eyes: Pupils are equal, round, and reactive to light.   Neck: Normal range of motion. Neck supple.   Cardiovascular: Normal rate, regular rhythm and normal heart sounds.    Pulmonary/Chest: Effort normal and breath sounds normal.   Abdominal: Soft. Bowel sounds are normal.   Musculoskeletal: Normal range of motion.   Neurological: She is alert and oriented to person, place, and time.   Skin: Skin is warm and dry.   Psychiatric: She has a normal mood and affect. Her behavior is normal.       Significant Labs:  BMP:   Recent Labs  Lab 01/23/18  0838   GLU 97      K 3.9      CO2 27   BUN 12   CREATININE 0.8   CALCIUM 9.5   MG 2.1     CBC:   Recent Labs  Lab 01/23/18  0838   WBC 8.72   RBC 3.40*   HGB 8.9*   HCT 27.9*      MCV 82   MCH 26.2*   MCHC 31.9*     Coagulation:   Recent Labs  Lab 01/22/18  0500   INR 1.0   APTT 28.6       Significant Diagnostics:  CXR: I have reviewed all pertinent results/findings within the past 24 hours

## 2018-01-23 NOTE — PLAN OF CARE
Problem: Patient Care Overview  Goal: Plan of Care Review  Outcome: Ongoing (interventions implemented as appropriate)  Pt verbalizes no complaints. Denies CP, SOB, or other discomforts. Pt receiving IVP lasix to remove fluid. Pt encouraged to ambulate and to do IS. Educated on sternal precautions. Pt remains free of fall or injury. Pt verbalizes understanding of plan of care. Will continue to monitor.

## 2018-01-23 NOTE — PLAN OF CARE
S/P MVR on 1/18/18. CM visited with pt to discuss home health. Pt states she doesn't think she will home health.  Pt states she has ambulated the [facility]. Plan to dc tomorrow. Spoke with Ava BURLESON and she stated she spoke with pt's  who wanted pt to have HH. CM will revisit tomorrow when  present.        01/23/18 1292   Discharge Assessment   Assessment Type Discharge Planning Assessment   Confirmed/corrected address and phone number on facesheet? Yes   Assessment information obtained from? Patient   Expected Length of Stay (days) 4.5   Prior to hospitilization cognitive status: Alert/Oriented   Prior to hospitalization functional status: Independent   Current cognitive status: Alert/Oriented   Current Functional Status: Independent   Lives With spouse   Able to Return to Prior Arrangements yes   Is patient able to care for self after discharge? Yes   Who are your caregiver(s) and their phone number(s)? Luis Miguel/ [phone]   Patient's perception of discharge disposition home or selfcare   Readmission Within The Last 30 Days no previous admission in last 30 days   Patient currently being followed by outpatient case management? No   Patient currently receives any other outside agency services? No   Equipment Currently Used at Home none   Do you have any problems affording any of your prescribed medications? No   Is the patient taking medications as prescribed? yes   Does the patient have transportation home? Yes   Transportation Available family or friend will provide   Does the patient receive services at the [facility]? No   Discharge Plan A Home with family   Discharge Plan B Home with family;Home Health   Patient/Family In Agreement With Plan yes

## 2018-01-23 NOTE — PHYSICIAN QUERY
PT Name: Alondra Carrera  MR #: [id number]     Physician Query Form - Documentation Clarification      CDS/: Mayra Hewitt RN, CCDS              Contact information: [email or web address]    This form is a permanent document in the medical record.     Query Date: January 23, 2018    By submitting this query, we are merely seeking further clarification of documentation. Please utilize your independent clinical judgment when addressing the question(s) below.    The Medical record reflects the following:    Supporting Clinical Findings Location in Medical Record     K+ 3.2    Potassium Chloride 60 meq PO x1       1/22 lab    1/22 mar     Monitor electrolytes and replace prn.       1/22 prog note                                                                            Doctor, Please specify diagnosis or diagnoses associated with above clinical findings.    Provider Use Only        (   x )  Hypokalemia    (   )  Other__________                                                                                                                     [  ] Clinically undetermined

## 2018-01-23 NOTE — PLAN OF CARE
Problem: Physical Therapy Goal  Goal: Physical Therapy Goal  Goals to be met by: 18    Patient will increase functional independence with mobility by performin. Supine to sit with Stand-by Assistance - not met  2. Sit to stand transfer with Supervision - not met  3. Gait  x 220 feet with Supervision - not met  4. Ascend/descend 12 stair with right Handrails Contact Guard Assistance - not met     Outcome: Ongoing (interventions implemented as appropriate)    Goals appropriate as above to meet pt mobility needs.     Jaja Garcia, SPT  18

## 2018-01-24 VITALS
HEART RATE: 87 BPM | DIASTOLIC BLOOD PRESSURE: 63 MMHG | RESPIRATION RATE: 18 BRPM | WEIGHT: 182.56 LBS | HEIGHT: 67 IN | SYSTOLIC BLOOD PRESSURE: 95 MMHG | TEMPERATURE: 99 F | BODY MASS INDEX: 28.65 KG/M2 | OXYGEN SATURATION: 98 %

## 2018-01-24 DIAGNOSIS — Z98.890 S/P MVR (MITRAL VALVE REPAIR): Primary | ICD-10-CM

## 2018-01-24 PROBLEM — R73.9 STRESS HYPERGLYCEMIA: Status: RESOLVED | Noted: 2018-01-19 | Resolved: 2018-01-24

## 2018-01-24 PROBLEM — D62 ACUTE BLOOD LOSS ANEMIA: Status: RESOLVED | Noted: 2018-01-24 | Resolved: 2018-01-24

## 2018-01-24 PROBLEM — E83.39 HYPOPHOSPHATEMIA: Status: RESOLVED | Noted: 2018-01-20 | Resolved: 2018-01-24

## 2018-01-24 PROBLEM — E83.39 HYPOPHOSPHATEMIA: Status: RESOLVED | Noted: 2018-01-24 | Resolved: 2018-01-24

## 2018-01-24 PROBLEM — D62 ACUTE BLOOD LOSS ANEMIA: Status: RESOLVED | Noted: 2018-01-20 | Resolved: 2018-01-24

## 2018-01-24 LAB
ANION GAP SERPL CALC-SCNC: 11 MMOL/L
BASOPHILS # BLD AUTO: 0.06 K/UL
BASOPHILS NFR BLD: 0.6 %
BUN SERPL-MCNC: 12 MG/DL
CALCIUM SERPL-MCNC: 9.8 MG/DL
CHLORIDE SERPL-SCNC: 99 MMOL/L
CO2 SERPL-SCNC: 30 MMOL/L
CREAT SERPL-MCNC: 0.9 MG/DL
DIFFERENTIAL METHOD: ABNORMAL
EOSINOPHIL # BLD AUTO: 0.1 K/UL
EOSINOPHIL NFR BLD: 1 %
ERYTHROCYTE [DISTWIDTH] IN BLOOD BY AUTOMATED COUNT: 13.6 %
EST. GFR  (AFRICAN AMERICAN): >60 ML/MIN/1.73 M^2
EST. GFR  (NON AFRICAN AMERICAN): >60 ML/MIN/1.73 M^2
ESTIMATED PA SYSTOLIC PRESSURE: 24.6
GLUCOSE SERPL-MCNC: 95 MG/DL
HCT VFR BLD AUTO: 29.7 %
HGB BLD-MCNC: 9.3 G/DL
IMM GRANULOCYTES # BLD AUTO: 0.07 K/UL
IMM GRANULOCYTES NFR BLD AUTO: 0.7 %
LYMPHOCYTES # BLD AUTO: 2.1 K/UL
LYMPHOCYTES NFR BLD: 20.4 %
MAGNESIUM SERPL-MCNC: 2.2 MG/DL
MCH RBC QN AUTO: 26.4 PG
MCHC RBC AUTO-ENTMCNC: 31.3 G/DL
MCV RBC AUTO: 84 FL
MONOCYTES # BLD AUTO: 0.9 K/UL
MONOCYTES NFR BLD: 8.9 %
NEUTROPHILS # BLD AUTO: 7 K/UL
NEUTROPHILS NFR BLD: 68.4 %
NRBC BLD-RTO: 0 /100 WBC
PHOSPHATE SERPL-MCNC: 3.6 MG/DL
PLATELET # BLD AUTO: 329 K/UL
PMV BLD AUTO: 10.8 FL
POTASSIUM SERPL-SCNC: 3.9 MMOL/L
RBC # BLD AUTO: 3.52 M/UL
RETIRED EF AND QEF - SEE NOTES: 70 (ref 55–65)
SODIUM SERPL-SCNC: 140 MMOL/L
TRICUSPID VALVE REGURGITATION: NORMAL
WBC # BLD AUTO: 10.24 K/UL

## 2018-01-24 PROCEDURE — 83735 ASSAY OF MAGNESIUM: CPT

## 2018-01-24 PROCEDURE — 25000003 PHARM REV CODE 250: Performed by: NURSE PRACTITIONER

## 2018-01-24 PROCEDURE — 25000242 PHARM REV CODE 250 ALT 637 W/ HCPCS: Performed by: NURSE PRACTITIONER

## 2018-01-24 PROCEDURE — 84100 ASSAY OF PHOSPHORUS: CPT

## 2018-01-24 PROCEDURE — 80048 BASIC METABOLIC PNL TOTAL CA: CPT

## 2018-01-24 PROCEDURE — 85025 COMPLETE CBC W/AUTO DIFF WBC: CPT

## 2018-01-24 PROCEDURE — 93306 TTE W/DOPPLER COMPLETE: CPT

## 2018-01-24 PROCEDURE — 93306 TTE W/DOPPLER COMPLETE: CPT | Mod: 26,,, | Performed by: INTERNAL MEDICINE

## 2018-01-24 PROCEDURE — 63600175 PHARM REV CODE 636 W HCPCS: Performed by: NURSE PRACTITIONER

## 2018-01-24 PROCEDURE — 36415 COLL VENOUS BLD VENIPUNCTURE: CPT

## 2018-01-24 PROCEDURE — 97530 THERAPEUTIC ACTIVITIES: CPT

## 2018-01-24 RX ORDER — FAMOTIDINE 20 MG/1
20 TABLET, FILM COATED ORAL 2 TIMES DAILY
Qty: 60 TABLET | Refills: 11 | Status: SHIPPED | OUTPATIENT
Start: 2018-01-24 | End: 2018-01-24 | Stop reason: HOSPADM

## 2018-01-24 RX ORDER — DOCUSATE SODIUM 100 MG/1
100 CAPSULE, LIQUID FILLED ORAL 2 TIMES DAILY
Refills: 0 | COMMUNITY
Start: 2018-01-24 | End: 2018-02-15 | Stop reason: ALTCHOICE

## 2018-01-24 RX ORDER — ASPIRIN 325 MG
325 TABLET ORAL DAILY
Refills: 0 | COMMUNITY
Start: 2018-01-25 | End: 2019-01-25

## 2018-01-24 RX ORDER — POTASSIUM CHLORIDE 20 MEQ/1
20 TABLET, EXTENDED RELEASE ORAL 2 TIMES DAILY
Qty: 60 TABLET | Refills: 0 | Status: SHIPPED | OUTPATIENT
Start: 2018-01-24 | End: 2018-02-15 | Stop reason: ALTCHOICE

## 2018-01-24 RX ORDER — MONTELUKAST SODIUM 10 MG/1
10 TABLET ORAL NIGHTLY
Status: DISCONTINUED | OUTPATIENT
Start: 2018-01-24 | End: 2018-01-24 | Stop reason: HOSPADM

## 2018-01-24 RX ORDER — FUROSEMIDE 20 MG/1
20 TABLET ORAL 2 TIMES DAILY
Qty: 60 TABLET | Refills: 0 | Status: SHIPPED | OUTPATIENT
Start: 2018-01-24 | End: 2018-02-15 | Stop reason: ALTCHOICE

## 2018-01-24 RX ORDER — POLYETHYLENE GLYCOL 3350 17 G/17G
17 POWDER, FOR SOLUTION ORAL DAILY
Qty: 10 PACKET | Refills: 0 | Status: SHIPPED | OUTPATIENT
Start: 2018-01-25 | End: 2018-02-15 | Stop reason: ALTCHOICE

## 2018-01-24 RX ORDER — METOPROLOL TARTRATE 75 MG/1
75 TABLET, FILM COATED ORAL 2 TIMES DAILY
Qty: 60 TABLET | Refills: 4 | Status: SHIPPED | OUTPATIENT
Start: 2018-01-24 | End: 2023-04-10

## 2018-01-24 RX ORDER — OXYCODONE HYDROCHLORIDE 5 MG/1
5 TABLET ORAL EVERY 4 HOURS PRN
Qty: 60 TABLET | Refills: 0 | Status: SHIPPED | OUTPATIENT
Start: 2018-01-24 | End: 2018-02-15 | Stop reason: ALTCHOICE

## 2018-01-24 RX ADMIN — METOPROLOL TARTRATE 75 MG: 50 TABLET ORAL at 08:01

## 2018-01-24 RX ADMIN — FUROSEMIDE 40 MG: 10 INJECTION, SOLUTION INTRAMUSCULAR; INTRAVENOUS at 08:01

## 2018-01-24 RX ADMIN — FAMOTIDINE 20 MG: 20 TABLET, FILM COATED ORAL at 08:01

## 2018-01-24 RX ADMIN — POTASSIUM & SODIUM PHOSPHATES POWDER PACK 280-160-250 MG 2 PACKET: 280-160-250 PACK at 06:01

## 2018-01-24 RX ADMIN — DULOXETINE 60 MG: 60 CAPSULE, DELAYED RELEASE ORAL at 08:01

## 2018-01-24 RX ADMIN — TIOTROPIUM BROMIDE 18 MCG: 18 CAPSULE ORAL; RESPIRATORY (INHALATION) at 09:01

## 2018-01-24 RX ADMIN — ASPIRIN 325 MG ORAL TABLET 325 MG: 325 PILL ORAL at 08:01

## 2018-01-24 RX ADMIN — ACETAMINOPHEN 650 MG: 325 TABLET ORAL at 08:01

## 2018-01-24 RX ADMIN — POTASSIUM CHLORIDE 20 MEQ: 1500 TABLET, EXTENDED RELEASE ORAL at 08:01

## 2018-01-24 NOTE — PT/OT/SLP PROGRESS
"Physical Therapy Treatment/Discharge Summary    Patient Name:  Alondra Carrera   MRN:  [id number]    Recommendations:     Discharge Recommendations:  home with home health   Discharge Equipment Recommendations: none   Barriers to discharge: Inaccessible home    Assessment:     Alondra Carrera is a 47 y.o. female admitted with a medical diagnosis of S/P MVR (mitral valve repair).  She presents with the following impairments/functional limitations:  impaired endurance, impaired self care skills, impaired functional mobilty, impaired balance, impaired cardiopulmonary response to activity, gait instability.  Pt ascend/descend 10 stairs this date requiring SBA-CGA. Pt performed x3 sit to stand trials with (S); amb 60 ft in hallway setting with SBA. Pt no longer requiring skilled acute PT services. Pt appropriate for progression of mobility with RN staff and in home health setting.    Rehab Prognosis:  Good; patient would benefit from acute skilled PT services to address these deficits and reach maximum level of function.      Recent Surgery: Procedure(s) (LRB):  Mitral Valve Repair (N/A) 6 Days Post-Op    Plan:      Plan to D/C home with  services and support from spouse   Plan of Care Reviewed with: patient    Subjective     Communicated with RN (Gail) prior to session.  Patient found UIC upon PT entry to room, agreeable to treatment.      Chief Complaint: "I have some chest pain but I took tylenol"  Patient comments/goals: "I should be going home today"  Pain/Comfort:  · Pain Rating 1: 3/10  · Location - Side 1: Bilateral  · Location - Orientation 1: midline  · Location 1: sternal  · Pain Addressed 1: Pre-medicate for activity, Distraction    Patients cultural, spiritual, Nondenominational conflicts given the current situation: none noted    Objective:     Patient found with: telemetry     General Precautions: Standard, fall, sternal   Orthopedic Precautions:N/A   Braces: N/A     Functional Mobility:  · Bed Mobility:   "   · Sit to Supine: supervision; VC to maintain sternal precautions  · Transfers:     · Sit to Stand: x3 trials requrining supervision with no AD from; x1 bedside chair; x2 from   · Gait: 60 ft in hallway setting with no AD requiring SBA  · Stairs:  Pt ascended/descended 10 stair(s) with No AD using left handrail and demo step to pattern; on descent pt utilizing light touch on PT shoulder for balance     Therapeutic Activities and Exercises:  PT arrived to pt room to find pt UIC; agreeable to treatment. PT transfered pt in  to [facility] for energy conservation prior to ascend/descend steps. Pt requiring x2 min seated rest break following completion of stairs. Upon return to pt hallway, pt agreeable to amb 60 ft to room. Pt with complaints on SOB. Pt encouraged deep breathing techniques. PT answered all pt questions/concerns within scope of practice. RN notified of pt response to treatment and current status. Acute skilled PT no longer required at this time. Recommending d/c home with  services and support from spouse as needed.       AM-PAC 6 CLICK MOBILITY  Turning over in bed (including adjusting bedclothes, sheets and blankets)?: 3  Sitting down on and standing up from a chair with arms (e.g., wheelchair, bedside commode, etc.): 4  Moving from lying on back to sitting on the side of the bed?: 3  Moving to and from a bed to a chair (including a wheelchair)?: 4  Need to walk in hospital room?: 4  Climbing 3-5 steps with a railing?: 3  Total Score: 21       Patient left HOB elevated with call button in reach and RN notified..    GOALS:    Physical Therapy Goals     Not on file          Multidisciplinary Problems (Resolved)        Problem: Physical Therapy Goal    Goal Priority Disciplines Outcome Goal Variances Interventions   Physical Therapy Goal   (Resolved)     PT/OT, PT Outcome(s) achieved     Description:  Goals to be met by: 1/26/18    Patient will increase functional independence with mobility by  performin. Supine to sit with Stand-by Assistance - not met  2. Sit to stand transfer with Supervision - met 18  3. Gait  x 220 feet with Supervision - not met   4. Ascend/descend 12 stair with right Handrails Contact Guard Assistance - met 18                       Time Tracking:     PT Received On: 18  PT Start Time: 934     PT Stop Time: 948  PT Total Time (min): 14 min     Billable Minutes: Therapeutic Activity 14    Treatment Type: Treatment  PT/PTA: PT       Jaja Garcia, SPT   18

## 2018-01-24 NOTE — PLAN OF CARE
Problem: Patient Care Overview  Goal: Plan of Care Review  Outcome: Outcome(s) achieved Date Met: 01/24/18  Pt verbalizes no complaints. Denies CP, SOB, or other discomforts. Pt being dc'd home today. Pt remains free of fall or injury. Pt verbalizes understanding of plan of care. Will continue to monitor.

## 2018-01-24 NOTE — PLAN OF CARE
Pt was accepted to [facility].    Zhane Brooks, [facility] x [phone]

## 2018-01-24 NOTE — PLAN OF CARE
Problem: Physical Therapy Goal  Goal: Physical Therapy Goal  Goals to be met by: 18    Patient will increase functional independence with mobility by performin. Supine to sit with Stand-by Assistance - not met  2. Sit to stand transfer with Supervision - met 18  3. Gait  x 220 feet with Supervision - not met   4. Ascend/descend 12 stair with right Handrails Contact Guard Assistance - met 18     Outcome: Outcome(s) achieved Date Met: 18    Pt has met the above goals to the satisfaction of this PT.    Jaja Garcia, SPT  18

## 2018-01-24 NOTE — PLAN OF CARE
SW spoke with the CM and the pt is agreeable to HH now.  The SW sent the referral to [facility] through [facility].  SW will f/u in a few hours.    Zhane Brooks, CLEMENT  [phone]

## 2018-01-24 NOTE — DISCHARGE SUMMARY
[facility]  Cardiothoracic Surgery  Discharge Summary      Patient Name: Alondra Carrera  MRN: [id number]  Admission Date: 1/18/2018  Hospital Length of Stay: 6 days  Discharge Date and Time:  01/24/2018 3:19 PM  Attending Physician: Randall Sorensen MD   Discharging Provider: Ava Alcantara NP  Primary Care Provider: Nita De La Cruz MD    HPI:   Alondra Carrera is a 47 y.o. female who present as a referral from Dr. Garner as a consult for surgical intervention for her severe MR with MV prolapse. Her PMHx includes severe MR with moderate mitral valve prolapse, pulmonary hypertension, white mater lesions, and anxiety. She is a current smoker. She endorses fatigue, SOB with ADLs, chest pain, palpitations, and orthopnea. She has known she had a murmur since childhood, but in the last 6 months she has noticed an increase in her symptoms.     Procedure(s) (LRB):  Mitral Valve Repair (N/A)      Indwelling Lines/Drains at time of discharge:   Lines/Drains/Airways     Central Venous Catheter Line                 Introducer 01/18/18 0720 right internal jugular 6 days          Epidural Line                 Perineural Analgesia/Anesthesia Assessment (using dermatomes) Epidural 01/18/18 0801 6 days          Line                 Pacer Wires 01/18/18 1322 6 days              Hospital Course: On 1/18/18, the patient was taken to the Operating Room for the above stated procedure. Please see the previously dictated operative report for complete details. Postoperatively, the patient was taken from the  Operating Room to the ICU where the vital signs were monitored and pain was kept under control. The patient was weaned from the drips and extubated in the ICU per protocol. Once hemodynamically stable, the patient was transferred to the Cardiac Step-Down floor for continued strengthening and ambulation. On postoperative day 6, the patient was ready for discharge to home. At the time of discharge, the patient was  ambulating unassisted. Pain was well controlled with oral analgesics and the patient was tolerating the diet.     MOBILITY AND ACTIVITY: As tolerated. Patient may shower. No heavy lifting of greater than 5 pounds and no driving.     DIET: An 1800-calorie ADA with a 1500 mL fluid restriction.     WOUND CARE INSTRUCTIONS: Check for redness, swelling and drainage around the  incision or wound. Patient is to call for any obvious bleeding, drainage, pus from the wound, unusual problems or difficulties or temperature of greater than 101   degrees.     FOLLOWUP: Follow up with Dr. Sorensen in approximately 3 weeks. Prior to this  appointment, the patient will have a chest x-ray and EKG.     Patient was not prescribed an ace inhibitor at time of discharge due to potential for hypotension.     DISCHARGE CONDITION: At the time of discharge, the patient was in sinus rhythm and afebrile with stable vital signs.      Consults         Status Ordering Provider     Consult Case Management/Social Work  Once     Provider:  (Not yet assigned)    Acknowledged VIKY BENNETT     Consult to Endocrinology  Once     Provider:  (Not yet assigned)    Completed VIKY BENNETT     Inpatient consult to Cardiac Rehab  Once     Provider:  (Not yet assigned)    Completed VIKY BENNETT     Inpatient consult to Endocrinology  Once     Provider:  (Not yet assigned)    Completed VIKY ORONA     Inpatient consult to Registered Dietitian/Nutritionist  Once     Provider:  (Not yet assigned)    Completed VIKY BENNETT          Significant Diagnostic Studies: Labs:   BMP:   Recent Labs  Lab 01/23/18  0838 01/24/18  0445   GLU 97 95    140   K 3.9 3.9    99   CO2 27 30*   BUN 12 12   CREATININE 0.8 0.9   CALCIUM 9.5 9.8   MG 2.1 2.2    and CBC   Recent Labs  Lab 01/23/18  0838 01/24/18  0445   WBC 8.72 10.24   HGB 8.9* 9.3*   HCT 27.9* 29.7*    329       Pending Diagnostic Studies:     None        Final Active  Diagnoses:    Diagnosis Date Noted POA    PRINCIPAL PROBLEM:  S/P MVR (mitral valve repair) [Z98.890] 01/19/2018 Not Applicable      Problems Resolved During this Admission:    Diagnosis Date Noted Date Resolved POA    Hypophosphatemia [E83.39] 01/20/2018 01/24/2018 Unknown    Acute blood loss anemia [D62] 01/20/2018 01/24/2018 Unknown    Stress hyperglycemia [R73.9] 01/19/2018 01/24/2018 No    Mitral valve insufficiency [I34.0] 01/19/2018 01/20/2018 Yes    Mitral valve insufficiency [I34.0] 12/12/2017 01/19/2018 Yes      Discharged Condition: good    Disposition: home with home health     Follow Up:  Follow-up Information     [facility].    Specialty:  Home Health Services  Why:  Home Health: The home health nurse will see the patient tomorrow.  Contact information:  [address]  [address]  [facility] [id number] [phone]             Randall Sorensen MD In 3 weeks.    Specialties:  Cardiothoracic Surgery, Transplant  Contact information:  [address] [facility] [address]  [phone]                 Patient Instructions:   No discharge procedures on file.  Medications:  Reconciled Home Medications:   Current Discharge Medication List      START taking these medications    Details   aspirin 325 MG tablet Take 1 tablet (325 mg total) by mouth once daily.  Refills: 0      docusate sodium (COLACE) 100 MG capsule Take 1 capsule (100 mg total) by mouth 2 (two) times daily.  Refills: 0      furosemide (LASIX) 20 MG tablet Take 1 tablet (20 mg total) by mouth 2 (two) times daily. Take one tablet by mouth twice daily for 7 days then decrease to once daily  Qty: 60 tablet, Refills: 0      metoprolol tartrate 75 mg Tab Take 75 mg by mouth 2 (two) times daily.  Qty: 60 tablet, Refills: 4      oxyCODONE (ROXICODONE) 5 MG immediate release tablet Take 1 tablet (5 mg total) by mouth every 4 (four) hours as needed.  Qty: 60 tablet, Refills: 0      polyethylene glycol (GLYCOLAX) 17 gram  PwPk Take 17 g by mouth once daily.  Qty: 10 packet, Refills: 0      potassium chloride SA (K-DUR,KLOR-CON) 20 MEQ tablet Take 1 tablet (20 mEq total) by mouth 2 (two) times daily.  Qty: 60 tablet, Refills: 0         CONTINUE these medications which have NOT CHANGED    Details   clonazepam (KLONOPIN) 0.5 MG tablet       duloxetine (CYMBALTA) 60 MG capsule Take 60 mg by mouth once daily.  Refills: 0      montelukast (SINGULAIR) 10 mg tablet       simvastatin (ZOCOR) 20 MG tablet Take 20 mg by mouth nightly.  Refills: 0      SPIRIVA WITH HANDIHALER 18 mcg inhalation capsule INHALE CONTENTS OF 1 CAPSULE ONCE DAILY.  Refills: 3      topiramate (TOPAMAX) 25 MG tablet       valacyclovir (VALTREX) 500 MG tablet Take 1 tablet (500 mg total) by mouth 2 (two) times daily.  Qty: 10 tablet, Refills: 11         STOP taking these medications       butalbital-acetaminophen-caffeine -40 mg (FIORICET, ESGIC) -40 mg per tablet Comments:   Reason for Stopping:         losartan (COZAAR) 50 MG tablet Comments:   Reason for Stopping:         metoprolol succinate (TOPROL-XL) 50 MG 24 hr tablet Comments:   Reason for Stopping:             Time spent on the discharge of patient: 35 minutes    Ava Alcantara NP  Cardiothoracic Surgery  [facility]

## 2018-01-25 PROBLEM — I34.0 NON-RHEUMATIC MITRAL REGURGITATION: Status: ACTIVE | Noted: 2018-01-25

## 2018-01-25 NOTE — PT/OT/SLP DISCHARGE
Occupational Therapy Discharge Summary    Alondra Carrera  MRN: [id number]   Principal Problem: S/P MVR (mitral valve repair)      Patient Discharged from acute Occupational Therapy on 1/24/18.  Please refer to prior OT note dated 1/22/18 for functional status.    Assessment:      Patient appropriate for care in another setting.    Objective:     GOALS:    Occupational Therapy Goals        Problem: Occupational Therapy Goal    Goal Priority Disciplines Outcome Interventions   Occupational Therapy Goal     OT, PT/OT Ongoing (interventions implemented as appropriate)    Description:  Goals to be met by: 1/29/18     Patient will increase functional independence with ADLs by performing:    Feeding with Morgan.  UE Dressing with Supervision.  LE Dressing with Supervision.  Grooming while standing at sink with Supervision.  Toileting from toilet with Supervision for hygiene and clothing management.   Toilet transfer to toilet with Supervision.                      Reasons for Discontinuation of Therapy Services  Transfer to alternate level of care.      Plan:     Patient Discharged to: Home with Home Health Service    ARIANA Campuzano  1/25/2018

## 2018-02-15 ENCOUNTER — HOSPITAL ENCOUNTER (OUTPATIENT)
Dept: CARDIOLOGY | Facility: CLINIC | Age: 48
Discharge: HOME | End: 2018-02-15
Payer: COMMERCIAL

## 2018-02-15 ENCOUNTER — OFFICE VISIT (OUTPATIENT)
Dept: CARDIOTHORACIC SURGERY | Facility: CLINIC | Age: 48
End: 2018-02-15
Payer: COMMERCIAL

## 2018-02-15 ENCOUNTER — HOSPITAL ENCOUNTER (OUTPATIENT)
Dept: RADIOLOGY | Facility: HOSPITAL | Age: 48
Discharge: HOME | End: 2018-02-15
Attending: THORACIC SURGERY (CARDIOTHORACIC VASCULAR SURGERY)
Payer: COMMERCIAL

## 2018-02-15 VITALS
BODY MASS INDEX: 28.56 KG/M2 | DIASTOLIC BLOOD PRESSURE: 59 MMHG | OXYGEN SATURATION: 100 % | WEIGHT: 182 LBS | HEART RATE: 68 BPM | SYSTOLIC BLOOD PRESSURE: 92 MMHG | HEIGHT: 67 IN | TEMPERATURE: 98 F

## 2018-02-15 DIAGNOSIS — Z98.890 S/P MVR (MITRAL VALVE REPAIR): Primary | ICD-10-CM

## 2018-02-15 DIAGNOSIS — Z98.890 S/P MVR (MITRAL VALVE REPAIR): ICD-10-CM

## 2018-02-15 PROCEDURE — 99999 PR PBB SHADOW E&M-EST. PATIENT-LVL III: CPT | Mod: PBBFAC,,, | Performed by: THORACIC SURGERY (CARDIOTHORACIC VASCULAR SURGERY)

## 2018-02-15 PROCEDURE — 99024 POSTOP FOLLOW-UP VISIT: CPT | Mod: S$GLB,,, | Performed by: THORACIC SURGERY (CARDIOTHORACIC VASCULAR SURGERY)

## 2018-02-15 PROCEDURE — 71046 X-RAY EXAM CHEST 2 VIEWS: CPT | Mod: TC,FY

## 2018-02-15 PROCEDURE — 93000 ELECTROCARDIOGRAM COMPLETE: CPT | Mod: S$GLB,,, | Performed by: INTERNAL MEDICINE

## 2018-02-15 PROCEDURE — 71046 X-RAY EXAM CHEST 2 VIEWS: CPT | Mod: 26,,, | Performed by: RADIOLOGY

## 2018-02-15 NOTE — LETTER
[facility]  [address]  [facility] [address]  Phone: [phone] February 15, 2018      Willis Garner MD  [address] [place], [place] [address]  [address]  [facility] [place] [address]    Patient: Alondra Carrera   MR Number: [id number]   YOB: 1970   Date of Visit: 2/15/2018     Dear Dr. Garner,     I had the pleasure of seeing your patient Mrs. Alondra Carrera in clinic today.  As you know, she is a very pleasant 47-year-old woman who initially presented with dyspnea on exertion, even to the point of compromising her ability to perform her activities of daily living.  After a thoughtful and thorough evaluation, she was found to have severe mitral regurgitation. On January 18, she underwent mitral valve repair. Her postoperative course was unremarkable, and she was ultimately discharged to home.    Today she returned for routine follow-up, and in clinic today Mrs. Carrera looked fabulous.  On physical examination, her sternum appeared to be healing nicely.  Her chest x-ray was clear.  Her EKG demonstrated a normal sinus rhythm.  I re-reviewed her predischarge echo. That study demonstrated no residual mitral regurgitation.  Overall, I'm very pleased with how well Mrs. Carrera has done.  As result, I did not schedule her to follow up with me.      Certainly, should you or she have any questions or concerns please do not hesitate to give me a call.  Of note, I have included a copy of her operative report and her predischarge echo for your records.  Thank you again for sending her to me    Sincerely,        Randall Sorensen MD  Chief, Division of Thoracic & Cardiovascular Surgery  [facility]    PEP/ecs      CC  Nita De La Cruz MD

## 2018-02-15 NOTE — PROGRESS NOTES
Patient seen and examined. Patient is progressively increasing activity. No significant complaints.     Sternum: stable, incision CDI  Chest xray: Acceptable post op chest  EKG: NSR     Assessment:  S/p Mitral valve repair with quadrangular resection of the P2  scallop of the posterior leaflet, sliding plasty and a 29 mm [employer] Simulus   band annuloplasty.     Plan:  Can begin driving   Can begin cardiac rehab 6 weeks after operation  We will refer to   to assume care   DC lasix, potassium, colace and pain meds  ECHO reviewed       No scheduled appointment, RTC prn      CTS Attending Note:    I have personally taken the history and examined this patient and agree with the NP's note as stated above.

## 2018-02-16 ENCOUNTER — DOCUMENTATION ONLY (OUTPATIENT)
Dept: CARDIOTHORACIC SURGERY | Facility: CLINIC | Age: 48
End: 2018-02-16

## 2018-02-16 DIAGNOSIS — Z98.890 S/P MVR (MITRAL VALVE REPAIR): Primary | ICD-10-CM

## 2018-02-20 RX ORDER — POTASSIUM CHLORIDE 20 MEQ/1
TABLET, EXTENDED RELEASE ORAL
Qty: 60 TABLET | Refills: 0 | OUTPATIENT
Start: 2018-02-20

## 2018-03-22 ENCOUNTER — RESEARCH ENCOUNTER (OUTPATIENT)
Dept: RESEARCH | Facility: HOSPITAL | Age: 48
End: 2018-03-22

## 2018-03-22 NOTE — PROGRESS NOTES
Telephone call to [facility] and spoke to Carrie Carrera was evaluated in the CR progrom on 3/20/2018 and will start next week.

## 2018-03-26 RX ORDER — FUROSEMIDE 20 MG/1
TABLET ORAL
Qty: 60 TABLET | Refills: 0 | OUTPATIENT
Start: 2018-03-26

## 2018-05-09 ENCOUNTER — TELEPHONE (OUTPATIENT)
Dept: ADMINISTRATIVE | Facility: CLINIC | Age: 48
End: 2018-05-09

## 2018-06-19 RX ORDER — METOPROLOL TARTRATE 75 MG/1
TABLET, FILM COATED ORAL
Qty: 60 TABLET | Refills: 4 | OUTPATIENT
Start: 2018-06-19

## 2018-12-13 RX ORDER — FAMOTIDINE 20 MG/1
TABLET, FILM COATED ORAL
Qty: 60 TABLET | Refills: 4 | OUTPATIENT
Start: 2018-12-13

## 2021-01-01 NOTE — ANESTHESIA POSTPROCEDURE EVALUATION
"Anesthesia Post Evaluation    Patient: Alondra Carrera    Procedure(s) Performed: Procedure(s) (LRB):  Mitral Valve Repair (N/A)    Final Anesthesia Type: general  Patient location during evaluation: ICU  Patient participation: Yes- Able to Participate  Level of consciousness: awake and alert  Post-procedure vital signs: reviewed and stable  Pain management: adequate  Airway patency: patent  PONV status at discharge: No PONV  Anesthetic complications: no      Cardiovascular status: stable  Respiratory status: unassisted and spontaneous ventilation  Hydration status: euvolemic  Follow-up not needed.        Visit Vitals  /63 (BP Location: Left arm, Patient Position: Lying)   Pulse (!) 115   Temp 37 °C (98.6 °F) (Oral)   Resp 19   Ht 5' 7" (1.702 m)   Wt 84.5 kg (186 lb 4.6 oz)   SpO2 (!) 93%   Breastfeeding? No   BMI 29.18 kg/m²       Pain/Nic Score: Pain Assessment Performed: Yes (1/22/2018  6:00 AM)  Presence of Pain: denies (1/22/2018  6:00 AM)  Pain Rating Prior to Med Admin: 3 (1/21/2018  8:48 PM)  Pain Rating Post Med Admin: 5 (1/21/2018  9:43 AM)      "
2021

## 2021-04-16 ENCOUNTER — PATIENT MESSAGE (OUTPATIENT)
Dept: RESEARCH | Facility: HOSPITAL | Age: 51
End: 2021-04-16

## 2021-04-25 NOTE — PT/OT/SLP PROGRESS
"Physical Therapy Treatment    Patient Name:  Alondra Carrera   MRN:  [id number]    Recommendations:     Discharge Recommendations:  home with home health   Discharge Equipment Recommendations: none   Barriers to discharge: Inaccessible home    Assessment:     Alondra Carrera is a 47 y.o. female admitted with a medical diagnosis of S/P MVR (mitral valve repair).  She presents with the following impairments/functional limitations:  impaired endurance, impaired self care skills, impaired functional mobilty, impaired balance, gait instability, impaired cardiopulmonary response to activity. Pt presents today with increased tolerance to activity. Pt performed x3 sit to stand transfers with close SBA. Pt amb total of 500 ft in hallway setting requiring SBA + chair follow with x2 seated rest breaks.     Rehab Prognosis:  Good; patient would benefit from acute skilled PT services to address these deficits and reach maximum level of function.      Recent Surgery: Procedure(s) (LRB):  Mitral Valve Repair (N/A) 5 Days Post-Op    Plan:     During this hospitalization, patient to be seen 5 x/week to address the above listed problems via gait training, therapeutic activities, therapeutic exercises, neuromuscular re-education  · Plan of Care Expires:  02/17/18   Plan of Care Reviewed with: patient    Subjective     Communicated with RN (Syed) prior to session.  Patient found UIC upon PT entry to room, agreeable to treatment.      Chief Complaint: none noted  Patient comments/goals: "I think its my sinus medicine that is making me so tired."  Pain/Comfort:  · Pain Rating 1: 0/10    Patients cultural, spiritual, Cheondoism conflicts given the current situation: none noted    Objective:     Patient found with: telemetry     General Precautions: Standard, fall, sternal   Orthopedic Precautions:N/A   Braces: N/A     Functional Mobility:  · Transfers:     · Sit<>Stand: x3 trials requiring close SBA- CGA for safety with descent into " chair; pt demo controlled descent with proper adherence to sternal precations  · Gait: SBA + chair follow for amb totaling 500 ft; pt with x2 seated rest breaks x1 min each after 200 ft and 300 ft.       Therapeutic Activities and Exercises:  PT arrived to pt room to find pt UIC; agreeable to treatment session. Pt performed mobility as above. Upon return to room pt with complaints of SOB. Educated pt on deep breathes with use of cardiac pillow/ hourly use of incentive spirometer in order to move past 500 jhon. All of pt questions/concerns addressed within PT scope of practice. RN and Nurse Practioner notified of pt mobility needs and of pt request to take sinus medication at night; pt stating she believes it is causing her to feel drowsy during the day. Plan to attempt stairs next treatment session.       AM-PAC 6 CLICK MOBILITY  Turning over in bed (including adjusting bedclothes, sheets and blankets)?: 3  Sitting down on and standing up from a chair with arms (e.g., wheelchair, bedside commode, etc.): 4  Moving from lying on back to sitting on the side of the bed?: 3  Moving to and from a bed to a chair (including a wheelchair)?: 3  Need to walk in hospital room?: 4  Climbing 3-5 steps with a railing?: 3  Total Score: 20       Patient left up in chair with call button in reach and RN notified..    GOALS:    Physical Therapy Goals        Problem: Physical Therapy Goal    Goal Priority Disciplines Outcome Goal Variances Interventions   Physical Therapy Goal     PT/OT, PT Ongoing (interventions implemented as appropriate)     Description:  Goals to be met by: 18    Patient will increase functional independence with mobility by performin. Supine to sit with Stand-by Assistance - not met  2. Sit to stand transfer with Supervision - not met  3. Gait  x 220 feet with Supervision - not met  4. Ascend/descend 12 stair with right Handrails Contact Guard Assistance - not met                      Time Tracking:      PT Received On: 01/23/18  PT Start Time: 0950     PT Stop Time: 1018  PT Total Time (min): 28 min     Billable Minutes: Gait Training 10 and Therapeutic Activity 15    Treatment Type: Treatment  PT/PTA: PT         Jaja Garcia, SPT   1/23/18     denies fever, chills, chest pain, SOB, abdominal pain, diarrhea, dysuria, syncope, bleeding, new rash,weakness, numbness, shoulder pain   ROS  otherwise negative as per HPI

## 2022-10-31 NOTE — PLAN OF CARE
Problem: Patient Care Overview  Goal: Plan of Care Review  Outcome: Ongoing (interventions implemented as appropriate)  Discussed Plan of Care with patient. VS stable. Ambulates well with stand-by assist; needs minimal help sit-to-stand. Fall precautions in place. Sternal precautions reviewed. Bleeding precautions reviewed and maintained. Pain denied. CVL removed from (R) IJ; new PIV placed and okay to extend length of PIV to (R) hand by 1 day, then assess need for 2 PIVs. Lasix IVP decreased to 40mg, BID. Lopressor increased to 50mg per dose. Patient remained free of falls/ injury. All questions and concerns were addressed. Will continue to monitor patient.        19:35-20:00. Chart reviewed; confirmed with RN to see the pt for PT. Pt ready for PT; received in bed with no complain of pain and in NAD. +hep lock R UE, +ace bandage R knee. Agreeable for PT evaluation.

## 2022-12-20 ENCOUNTER — HOSPITAL ENCOUNTER (EMERGENCY)
Facility: HOSPITAL | Age: 52
Discharge: HOME | End: 2022-12-20
Attending: EMERGENCY MEDICINE
Payer: COMMERCIAL

## 2022-12-20 VITALS
SYSTOLIC BLOOD PRESSURE: 129 MMHG | BODY MASS INDEX: 34.37 KG/M2 | OXYGEN SATURATION: 99 % | HEART RATE: 64 BPM | TEMPERATURE: 99 F | HEIGHT: 67 IN | DIASTOLIC BLOOD PRESSURE: 85 MMHG | RESPIRATION RATE: 20 BRPM | WEIGHT: 219 LBS

## 2022-12-20 DIAGNOSIS — R51.9 NONINTRACTABLE HEADACHE, UNSPECIFIED CHRONICITY PATTERN, UNSPECIFIED HEADACHE TYPE: Primary | ICD-10-CM
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
## 2022-12-20 PROCEDURE — 96372 THER/PROPH/DIAG INJ SC/IM: CPT | Performed by: PHYSICIAN ASSISTANT

## 2022-12-20 PROCEDURE — 99285 EMERGENCY DEPT VISIT HI MDM: CPT | Mod: 25

## 2022-12-20 PROCEDURE — 63600175 PHARM REV CODE 636 W HCPCS: Performed by: PHYSICIAN ASSISTANT

## 2022-12-20 RX ORDER — KETOROLAC TROMETHAMINE 30 MG/ML
30 INJECTION, SOLUTION INTRAMUSCULAR; INTRAVENOUS
Status: COMPLETED | OUTPATIENT
Start: 2022-12-20 | End: 2022-12-20

## 2022-12-20 RX ORDER — BUTALBITAL, ACETAMINOPHEN AND CAFFEINE 50; 325; 40 MG/1; MG/1; MG/1
1 TABLET ORAL EVERY 4 HOURS PRN
Qty: 30 TABLET | Refills: 0 | Status: SHIPPED | OUTPATIENT
Start: 2022-12-20 | End: 2023-01-19

## 2022-12-20 RX ADMIN — KETOROLAC TROMETHAMINE 30 MG: 30 INJECTION, SOLUTION INTRAMUSCULAR; INTRAVENOUS at 08:12

## 2022-12-20 NOTE — ED PROVIDER NOTES
Encounter Date: 12/20/2022    SCRIBE #1 NOTE: I, Cassi Chaudhry, am scribing for, and in the presence of,  Laurie Moore PA-C. Other sections scribed: HPI, ROS.     History     Chief Complaint   Patient presents with    Headache     Left sided headache that is getting worse. Reports going to urgent care after initial fall and hitting her head. Pt. Told to take tylenol but no improvement in s/s. Complaints of blurred vision, denies dizzness or any syncopal episodes.      CC: Headache    HPI: This is a 52 y.o. F who has a Hx of HTN, and Migraines who presents to the ED for emergent evaluation of acute headache to the left side of the head that began after she slipped and fell and hit the left side of her head 2 days ago. Pt states that the headache is worse in the morning and present throughout the entire day. She rates the headache a 9/10. She describes the headache as an aching pain. She states that the headache has worsened since the initial onset, which prompted today's ED visit. She took Excedrin 2 days ago with relief. She attempted taking Tylenol, and Ibuprofen without relief. Pt also reports left shoulder pain, back pain, and blurry vision since the fall. She states that her symptoms are worse with sitting for a long period of time. She was seen at an urgent care for similar symptoms yesterday, and was advised to continue to take Tylenol and to stop taking Excedrin and Ibuprofen. She takes an aspirin daily. She is not on blood thinners. She has not taken her daily medications as of yet today. She no longer has a menstrual period due to menopause. Pt denies neck pain, dizziness, or loss of consciousness.    The history is provided by the patient. No  was used.   Review of patient's allergies indicates:   Allergen Reactions    Sulfa (sulfonamide antibiotics)      Past Medical History:   Diagnosis Date    Genital herpes, unspecified     Hypertension     Mental disorder     Migraines       Past Surgical History:   Procedure Laterality Date    ECTOPIC PREGNANCY SURGERY      EXPLORATORY LAPAROTOMY W/ BOWEL RESECTION      MITRAL VALVE REPAIR      TUBAL LIGATION       Family History   Problem Relation Age of Onset    Breast cancer Neg Hx     Colon cancer Neg Hx     Ovarian cancer Neg Hx     COPD Neg Hx     Drug abuse Neg Hx     Hearing loss Neg Hx     Hyperlipidemia Neg Hx     Kidney disease Neg Hx      Social History     Tobacco Use    Smoking status: Every Day   Substance Use Topics    Alcohol use: Yes    Drug use: No     Review of Systems   Constitutional:  Negative for fever.   HENT:  Negative for sore throat.    Eyes:  Positive for visual disturbance (blurry vision).   Respiratory:  Negative for cough and shortness of breath.    Cardiovascular:  Negative for chest pain.   Gastrointestinal:  Negative for abdominal pain, diarrhea, nausea and vomiting.   Genitourinary:  Negative for dysuria.   Musculoskeletal:  Positive for arthralgias (left shoulder) and back pain. Negative for neck pain.   Skin:  Negative for rash.   Neurological:  Positive for headaches (left side of the head). Negative for dizziness, syncope and weakness.   Hematological:  Does not bruise/bleed easily.     Physical Exam     Initial Vitals [12/20/22 0645]   BP Pulse Resp Temp SpO2   129/85 64 20 98.6 °F (37 °C) 99 %      MAP       --         Physical Exam    Nursing note and vitals reviewed.  Constitutional: She appears well-developed and well-nourished. She is not diaphoretic. No distress.   HENT:   Head: Normocephalic and atraumatic.   There is mild tenderness to palpation of the left side of the head.  No bony deformity, ecchymosis or step off noted.   Eyes: EOM are normal.   Neck: Neck supple.   Normal range of motion.  Cardiovascular:  Normal rate and regular rhythm.           Pulmonary/Chest: Breath sounds normal. No respiratory distress. She has no wheezes. She has no rales.   Abdominal: Abdomen is soft. Bowel sounds are  normal. She exhibits no distension. There is no abdominal tenderness. There is no rebound and no guarding.   Musculoskeletal:         General: No edema. Normal range of motion.      Cervical back: Normal range of motion and neck supple.     Neurological: She is alert and oriented to person, place, and time. She has normal strength. She displays normal reflexes. No cranial nerve deficit or sensory deficit. GCS score is 15. GCS eye subscore is 4. GCS verbal subscore is 5. GCS motor subscore is 6.   Skin: Skin is warm. Capillary refill takes less than 2 seconds. No erythema.       ED Course   Procedures  Labs Reviewed - No data to display       Imaging Results              CT Head Without Contrast (Final result)  Result time 12/20/22 08:25:05      Final result by Alexander Garner III, MD (12/20/22 08:25:05)                   Impression:      No acute process seen.    Basal ganglia calcifications.  Over the age of 40 this is usually physiologic.  Other possibilities include toxic infectious metabolic inherited vascular disorders.      Electronically signed by: Alexander Garner MD  Date:    12/20/2022  Time:    08:25               Narrative:    EXAMINATION:  CT HEAD WITHOUT CONTRAST    CLINICAL HISTORY:  Head trauma, moderate-severe;    FINDINGS:  There are bilateral basal ganglia calcifications.  This is usually idiopathic.  No bleed, mass, or mass effect seen.  No acute process seen.  No skull lesion or skull fracture seen.  No acute infarcts are seen.  No trauma seen.  No blood products are seen.  No edema, mass, or mass effect seen.                                    X-Rays:   Independently Interpreted Readings:   Other Readings:  CT of the head reveals no acute intracranial abnormalities.  Medications   ketorolac injection 30 mg (has no administration in time range)     Medical Decision Making:   Initial Assessment:   This is a 52-year-old female presents to the emergency department with left-sided headache after fall  that occurred a few days ago.  She does take aspirin daily.  She went urgent care yesterday and was told to take over-the-counter medications.  She reports some mild blurry vision today.  Differential Diagnosis:   Acute intracranial hemorrhage, skull fracture, concussion  Clinical Tests:   Radiological Study: Ordered and Reviewed  ED Management:  This patient was given Toradol in the emergency department following CT scan that revealed no acute intracranial abnormality.  This is likely concussive in nature.  I will treat supportively.  She will need to follow up with her primary care doctor.  All labs and diagnostic study results were thoroughly discussed with her and she verbalized understanding and agreement.  She was stable at discharge.        Scribe Attestation:   Scribe #1: I performed the above scribed service and the documentation accurately describes the services I performed. I attest to the accuracy of the note.                 I, Laurie Moore PA-C, personally performed the services described in this documentation. All medical record entries made by the scribe were at my direction and in my presence. I have reviewed the chart and agree that the record reflects my personal performance and is accurate and complete.    Clinical Impression:   Final diagnoses:  [R51.9] Nonintractable headache, unspecified chronicity pattern, unspecified headache type (Primary)  [W19.XXXA] Fall, initial encounter        ED Disposition Condition    Discharge Stable          ED Prescriptions       Medication Sig Dispense Start Date End Date Auth. Provider    butalbital-acetaminophen-caffeine -40 mg (FIORICET, ESGIC) -40 mg per tablet Take 1 tablet by mouth every 4 (four) hours as needed for Pain. 30 tablet 12/20/2022 1/19/2023 Laurie Moore PA-C          Follow-up Information       Follow up With Specialties Details Why Contact Info    Nita De La Cruz MD General Practice   [address] [place] [address]  [id number]  [place] [id number]  [phone]               Laurie Moore PA-C  12/20/22 0880

## 2022-12-20 NOTE — DISCHARGE INSTRUCTIONS
Take medication as prescribed.  Do not drive while taking this medication as it could make a sleepy.  Please follow-up with your primary care doctor.  Return to the emergency department for any change or concerning symptoms.

## 2023-04-10 ENCOUNTER — OFFICE VISIT (OUTPATIENT)
Dept: FAMILY MEDICINE | Facility: CLINIC | Age: 53
End: 2023-04-10
Payer: COMMERCIAL

## 2023-04-10 VITALS
TEMPERATURE: 99 F | HEIGHT: 67 IN | HEART RATE: 66 BPM | BODY MASS INDEX: 33.01 KG/M2 | WEIGHT: 210.31 LBS | DIASTOLIC BLOOD PRESSURE: 74 MMHG | OXYGEN SATURATION: 98 % | SYSTOLIC BLOOD PRESSURE: 132 MMHG

## 2023-04-10 DIAGNOSIS — I10 BENIGN HYPERTENSION: Primary | Chronic | ICD-10-CM

## 2023-04-10 DIAGNOSIS — G43.709 CHRONIC MIGRAINE WITHOUT AURA WITHOUT STATUS MIGRAINOSUS, NOT INTRACTABLE: Chronic | ICD-10-CM

## 2023-04-10 DIAGNOSIS — E55.9 VITAMIN D DEFICIENCY: Chronic | ICD-10-CM

## 2023-04-10 DIAGNOSIS — E78.2 MIXED DYSLIPIDEMIA: Chronic | ICD-10-CM

## 2023-04-10 DIAGNOSIS — R73.9 HYPERGLYCEMIA: Chronic | ICD-10-CM

## 2023-04-10 DIAGNOSIS — Z11.4 SCREENING FOR HIV (HUMAN IMMUNODEFICIENCY VIRUS): ICD-10-CM

## 2023-04-10 DIAGNOSIS — Z11.59 NEED FOR HEPATITIS C SCREENING TEST: ICD-10-CM

## 2023-04-10 DIAGNOSIS — E66.09 CLASS 1 OBESITY DUE TO EXCESS CALORIES WITH SERIOUS COMORBIDITY AND BODY MASS INDEX (BMI) OF 32.0 TO 32.9 IN ADULT: Chronic | ICD-10-CM

## 2023-04-10 PROBLEM — K21.9 GERD WITHOUT ESOPHAGITIS: Status: ACTIVE | Noted: 2018-12-28

## 2023-04-10 PROBLEM — S96.911A SPRAIN AND STRAIN OF RIGHT ANKLE: Status: ACTIVE | Noted: 2022-12-16

## 2023-04-10 PROBLEM — G43.909 MIGRAINE: Status: ACTIVE | Noted: 2018-12-28

## 2023-04-10 PROBLEM — F41.1 GAD (GENERALIZED ANXIETY DISORDER): Status: ACTIVE | Noted: 2018-12-28

## 2023-04-10 PROBLEM — J30.9 ALLERGIC RHINITIS: Status: ACTIVE | Noted: 2018-12-28

## 2023-04-10 PROBLEM — G47.00 INSOMNIA: Status: ACTIVE | Noted: 2018-12-28

## 2023-04-10 PROBLEM — E66.811 CLASS 1 OBESITY DUE TO EXCESS CALORIES WITH SERIOUS COMORBIDITY AND BODY MASS INDEX (BMI) OF 33.0 TO 33.9 IN ADULT: Status: ACTIVE | Noted: 2021-09-01

## 2023-04-10 PROBLEM — Z86.79 HISTORY OF MITRAL VALVE DISEASE: Status: ACTIVE | Noted: 2018-12-28

## 2023-04-10 PROBLEM — I51.7 LVH (LEFT VENTRICULAR HYPERTROPHY): Status: ACTIVE | Noted: 2023-04-10

## 2023-04-10 PROBLEM — I11.9 HYPERTENSIVE HEART DISEASE: Status: ACTIVE | Noted: 2023-04-10

## 2023-04-10 PROBLEM — M79.672 FOOT PAIN, BILATERAL: Status: ACTIVE | Noted: 2022-12-16

## 2023-04-10 PROBLEM — M79.671 FOOT PAIN, BILATERAL: Status: ACTIVE | Noted: 2022-12-16

## 2023-04-10 PROBLEM — S93.401A SPRAIN AND STRAIN OF RIGHT ANKLE: Status: ACTIVE | Noted: 2022-12-16

## 2023-04-10 PROCEDURE — 1160F RVW MEDS BY RX/DR IN RCRD: CPT | Mod: CPTII,S$GLB,, | Performed by: FAMILY MEDICINE

## 2023-04-10 PROCEDURE — 3078F DIAST BP <80 MM HG: CPT | Mod: CPTII,S$GLB,, | Performed by: FAMILY MEDICINE

## 2023-04-10 PROCEDURE — 3008F BODY MASS INDEX DOCD: CPT | Mod: CPTII,S$GLB,, | Performed by: FAMILY MEDICINE

## 2023-04-10 PROCEDURE — 1159F MED LIST DOCD IN RCRD: CPT | Mod: CPTII,S$GLB,, | Performed by: FAMILY MEDICINE

## 2023-04-10 PROCEDURE — 1159F PR MEDICATION LIST DOCUMENTED IN MEDICAL RECORD: ICD-10-PCS | Mod: CPTII,S$GLB,, | Performed by: FAMILY MEDICINE

## 2023-04-10 PROCEDURE — 3078F PR MOST RECENT DIASTOLIC BLOOD PRESSURE < 80 MM HG: ICD-10-PCS | Mod: CPTII,S$GLB,, | Performed by: FAMILY MEDICINE

## 2023-04-10 PROCEDURE — 3044F PR MOST RECENT HEMOGLOBIN A1C LEVEL <7.0%: ICD-10-PCS | Mod: CPTII,S$GLB,, | Performed by: FAMILY MEDICINE

## 2023-04-10 PROCEDURE — 3075F PR MOST RECENT SYSTOLIC BLOOD PRESS GE 130-139MM HG: ICD-10-PCS | Mod: CPTII,S$GLB,, | Performed by: FAMILY MEDICINE

## 2023-04-10 PROCEDURE — 99204 OFFICE O/P NEW MOD 45 MIN: CPT | Mod: S$GLB,,, | Performed by: FAMILY MEDICINE

## 2023-04-10 PROCEDURE — 3044F HG A1C LEVEL LT 7.0%: CPT | Mod: CPTII,S$GLB,, | Performed by: FAMILY MEDICINE

## 2023-04-10 PROCEDURE — 3075F SYST BP GE 130 - 139MM HG: CPT | Mod: CPTII,S$GLB,, | Performed by: FAMILY MEDICINE

## 2023-04-10 PROCEDURE — 99999 PR PBB SHADOW E&M-EST. PATIENT-LVL IV: ICD-10-PCS | Mod: PBBFAC,,, | Performed by: FAMILY MEDICINE

## 2023-04-10 PROCEDURE — 1160F PR REVIEW ALL MEDS BY PRESCRIBER/CLIN PHARMACIST DOCUMENTED: ICD-10-PCS | Mod: CPTII,S$GLB,, | Performed by: FAMILY MEDICINE

## 2023-04-10 PROCEDURE — 3008F PR BODY MASS INDEX (BMI) DOCUMENTED: ICD-10-PCS | Mod: CPTII,S$GLB,, | Performed by: FAMILY MEDICINE

## 2023-04-10 PROCEDURE — 99999 PR PBB SHADOW E&M-EST. PATIENT-LVL IV: CPT | Mod: PBBFAC,,, | Performed by: FAMILY MEDICINE

## 2023-04-10 PROCEDURE — 99204 PR OFFICE/OUTPT VISIT, NEW, LEVL IV, 45-59 MIN: ICD-10-PCS | Mod: S$GLB,,, | Performed by: FAMILY MEDICINE

## 2023-04-10 RX ORDER — OMEPRAZOLE 40 MG/1
40 CAPSULE, DELAYED RELEASE ORAL
COMMUNITY
Start: 2022-12-20 | End: 2023-04-20 | Stop reason: DRUGHIGH

## 2023-04-10 RX ORDER — FENOFIBRATE 134 MG/1
134 CAPSULE ORAL
COMMUNITY
Start: 2023-04-09 | End: 2023-04-20

## 2023-04-10 RX ORDER — CETIRIZINE HYDROCHLORIDE 10 MG/1
10 TABLET ORAL
COMMUNITY
Start: 2023-01-03 | End: 2023-04-10

## 2023-04-10 RX ORDER — METOPROLOL TARTRATE 50 MG/1
50 TABLET ORAL 2 TIMES DAILY
COMMUNITY
End: 2023-04-20 | Stop reason: SDUPTHER

## 2023-04-10 RX ORDER — ROSUVASTATIN CALCIUM 40 MG/1
TABLET, COATED ORAL
COMMUNITY
Start: 2023-01-03 | End: 2023-04-10

## 2023-04-10 RX ORDER — NEOMYCIN SULFATE, POLYMYXIN B SULFATE AND DEXAMETHASONE 3.5; 10000; 1 MG/ML; [USP'U]/ML; MG/ML
SUSPENSION/ DROPS OPHTHALMIC
COMMUNITY
Start: 2023-01-03 | End: 2023-04-10

## 2023-04-10 RX ORDER — METHYLPREDNISOLONE 4 MG/1
TABLET ORAL
COMMUNITY
Start: 2022-12-19 | End: 2023-04-10

## 2023-04-10 RX ORDER — ERGOCALCIFEROL 1.25 MG/1
50000 CAPSULE ORAL
COMMUNITY
Start: 2023-03-27 | End: 2023-04-10

## 2023-04-10 RX ORDER — BUTALBITAL, ACETAMINOPHEN AND CAFFEINE 50; 325; 40 MG/1; MG/1; MG/1
1 TABLET ORAL EVERY 4 HOURS PRN
COMMUNITY
End: 2023-04-20 | Stop reason: SDUPTHER

## 2023-04-10 RX ORDER — HYDROCODONE BITARTRATE AND ACETAMINOPHEN 7.5; 325 MG/1; MG/1
TABLET ORAL
COMMUNITY
Start: 2023-01-03 | End: 2023-04-10

## 2023-04-10 RX ORDER — NIACIN 500 MG/1
TABLET, EXTENDED RELEASE ORAL
COMMUNITY
Start: 2023-01-03 | End: 2023-04-10

## 2023-04-10 RX ORDER — METHYLPREDNISOLONE 4 MG/1
TABLET ORAL
COMMUNITY
Start: 2023-01-03 | End: 2023-04-10

## 2023-04-10 RX ORDER — PHENTERMINE HYDROCHLORIDE 37.5 MG/1
TABLET ORAL
COMMUNITY
Start: 2023-01-03 | End: 2023-04-10

## 2023-04-10 RX ORDER — IBUPROFEN 600 MG/1
TABLET ORAL
COMMUNITY
Start: 2023-01-03

## 2023-04-10 RX ORDER — NIACIN 500 MG
500 CAPSULE, EXTENDED RELEASE ORAL
COMMUNITY
Start: 2022-07-01 | End: 2023-04-20

## 2023-04-10 RX ORDER — PHENTERMINE HYDROCHLORIDE 37.5 MG/1
CAPSULE ORAL
COMMUNITY
Start: 2023-01-03 | End: 2023-04-10

## 2023-04-10 RX ORDER — ERGOCALCIFEROL 1.25 MG/1
50000 CAPSULE ORAL
COMMUNITY
Start: 2022-07-01 | End: 2023-04-20

## 2023-04-10 RX ORDER — OMEPRAZOLE 40 MG/1
1 CAPSULE, DELAYED RELEASE ORAL DAILY
COMMUNITY
Start: 2022-11-07 | End: 2023-04-10

## 2023-04-10 RX ORDER — GENTAMICIN SULFATE 3 MG/ML
SOLUTION/ DROPS OPHTHALMIC
COMMUNITY
Start: 2023-01-03 | End: 2023-04-10

## 2023-04-10 RX ORDER — ASPIRIN 325 MG
325 TABLET, DELAYED RELEASE (ENTERIC COATED) ORAL
COMMUNITY

## 2023-04-10 RX ORDER — FENOFIBRATE 134 MG/1
134 CAPSULE ORAL
COMMUNITY
Start: 2022-10-13 | End: 2023-04-10

## 2023-04-10 NOTE — PROGRESS NOTES
"  Patient Name: Alondra Carrera    : 1970  MRN: [id number]      Subjective:     Patient ID: Alondra is a 52 y.o. female    Chief Complaint:  Hypertension (F/u, headaches started 22 after fall, wants referral to digital med)    52-year-old female with hypertension and hyperlipidemia comes in to establish care.  She sees Dr. Garner for cardiology.  She also reports a history of vitamin D deficiency, and chronic migraines.    She reports that she cycles of 3 times a week 30 meds at time a and recent restarted this.         Review of Systems   Constitutional:  Negative for unexpected weight change.   HENT:  Negative for ear pain and sore throat.    Eyes:  Negative for visual disturbance.   Respiratory:  Negative for shortness of breath.    Cardiovascular:  Negative for chest pain.   Gastrointestinal:  Negative for abdominal pain and blood in stool.   Endocrine: Negative for cold intolerance and heat intolerance.   Genitourinary:  Negative for dysuria and frequency.   Integumentary:  Negative for rash.   Neurological:  Positive for headaches. Negative for weakness and numbness.   Hematological:  Negative for adenopathy.   Psychiatric/Behavioral:  Negative for suicidal ideas.       Objective:   /74 (BP Location: Left arm, Patient Position: Sitting, BP Method: Large (Manual))   Pulse 66   Temp 99 °F (37.2 °C) (Oral)   Ht 5' 7" (1.702 m)   Wt 95.4 kg (210 lb 5.1 oz)   SpO2 98%   BMI 32.94 kg/m²     Physical Exam  Vitals reviewed.   Constitutional:       General: She is not in acute distress.  HENT:      Head: Normocephalic and atraumatic.      Right Ear: Ear canal and external ear normal.      Left Ear: Ear canal and external ear normal.      Nose: Nose normal.      Mouth/Throat:      Mouth: Mucous membranes are moist.      Pharynx: No oropharyngeal exudate or posterior oropharyngeal erythema.   Eyes:      Extraocular Movements: Extraocular movements intact.      Conjunctiva/sclera: Conjunctivae " normal.      Pupils: Pupils are equal, round, and reactive to light.   Cardiovascular:      Rate and Rhythm: Normal rate and regular rhythm.      Pulses: Normal pulses.      Heart sounds: Normal heart sounds.   Pulmonary:      Effort: Pulmonary effort is normal. No respiratory distress.      Breath sounds: No wheezing or rales.   Abdominal:      General: Abdomen is flat. Bowel sounds are normal. There is no distension.      Palpations: Abdomen is soft.      Tenderness: There is no abdominal tenderness. There is no guarding.   Musculoskeletal:      Cervical back: Normal range of motion. No rigidity or tenderness.   Lymphadenopathy:      Cervical: No cervical adenopathy.   Skin:     General: Skin is warm.      Capillary Refill: Capillary refill takes less than 2 seconds.   Neurological:      Mental Status: She is alert and oriented to person, place, and time.      Cranial Nerves: No cranial nerve deficit.      Sensory: No sensory deficit.   Psychiatric:         Mood and Affect: Mood normal.         Behavior: Behavior normal.      Assessment        ICD-10-CM ICD-9-CM   1. Benign hypertension  I10 401.1   2. Class 1 obesity due to excess calories with serious comorbidity and body mass index (BMI) of 32.0 to 32.9 in adult  E66.09 278.00    Z68.32 V85.32   3. Mixed dyslipidemia  E78.2 272.2   4. Chronic migraine without aura without status migrainosus, not intractable  G43.709 346.70   5. Vitamin D deficiency  E55.9 268.9   6. Hyperglycemia  R73.9 790.29   7. Screening for HIV (human immunodeficiency virus)  Z11.4 V73.89   8. Need for hepatitis C screening test  Z11.59 V73.89         Plan:     1. Benign hypertension  Assessment & Plan:  Labs ordered.  Continue current regimen.    Orders:  -     Comprehensive Metabolic Panel; Future; Expected date: 04/10/2023  -     Lipid Panel; Future; Expected date: 04/10/2023  -     CBC Auto Differential; Future; Expected date: 04/10/2023    2. Class 1 obesity due to excess calories  with serious comorbidity and body mass index (BMI) of 32.0 to 32.9 in adult  Assessment & Plan:  The patient's BMI has been recorded in the chart. The patient has been provided educational materials regarding the benefits of attaining and maintaining a normal weight. We will continue to address and follow this issue during follow up visits.      3. Mixed dyslipidemia  Assessment & Plan:  Lipid panel ordered.    Orders:  -     Lipid Panel; Future; Expected date: 04/10/2023  -     CBC Auto Differential; Future; Expected date: 04/10/2023  -     TSH; Future; Expected date: 04/10/2023    4. Chronic migraine without aura without status migrainosus, not intractable      5. Vitamin D deficiency  Assessment & Plan:  Check vitamin-D and parathyroid hormone level.    Orders:  -     Vitamin D; Future; Expected date: 04/10/2023  -     PTH, intact; Future; Expected date: 04/10/2023    6. Hyperglycemia  Assessment & Plan:  Check A1c.    Orders:  -     Hemoglobin A1C; Future; Expected date: 04/10/2023    7. Screening for HIV (human immunodeficiency virus)  Assessment & Plan:  Screen for HIV as per USPSTF guidelines     Orders:   -     HIV 1/2 Ag/Ab (4th Gen); Future; Expected date: 04/10/2023    8. Need for hepatitis C screening test  Assessment & Plan:  Screen for HCV as per USPSTF guidelines     Orders:   -     Hepatitis C Antibody; Future; Expected date: 04/10/2023           -Karson Jackson Jr., MD, AAHIVS      This office note has been dictated.  This dictation has been generated using M-Modal Fluency Direct dictation; some phonetic errors may occur.         There are no Patient Instructions on file for this visit.      Future Appointments   Date Time Provider Department Center   4/20/2023  1:00 PM Karson Jackson Jr., MD [facility]

## 2023-04-12 ENCOUNTER — LAB VISIT (OUTPATIENT)
Dept: LAB | Facility: HOSPITAL | Age: 53
End: 2023-04-12
Attending: FAMILY MEDICINE
Payer: COMMERCIAL

## 2023-04-12 ENCOUNTER — PATIENT MESSAGE (OUTPATIENT)
Dept: ADMINISTRATIVE | Facility: HOSPITAL | Age: 53
End: 2023-04-12
Payer: COMMERCIAL

## 2023-04-12 DIAGNOSIS — E78.2 MIXED DYSLIPIDEMIA: ICD-10-CM

## 2023-04-12 DIAGNOSIS — E55.9 VITAMIN D DEFICIENCY: ICD-10-CM

## 2023-04-12 DIAGNOSIS — I10 BENIGN HYPERTENSION: ICD-10-CM

## 2023-04-12 DIAGNOSIS — R73.9 HYPERGLYCEMIA: ICD-10-CM

## 2023-04-12 DIAGNOSIS — Z11.59 NEED FOR HEPATITIS C SCREENING TEST: ICD-10-CM

## 2023-04-12 DIAGNOSIS — Z11.4 SCREENING FOR HIV (HUMAN IMMUNODEFICIENCY VIRUS): ICD-10-CM

## 2023-04-12 LAB
25(OH)D3+25(OH)D2 SERPL-MCNC: 29 NG/ML (ref 30–96)
ALBUMIN SERPL BCP-MCNC: 3.7 G/DL (ref 3.5–5.2)
ALP SERPL-CCNC: 55 U/L (ref 55–135)
ALT SERPL W/O P-5'-P-CCNC: 8 U/L (ref 10–44)
ANION GAP SERPL CALC-SCNC: 7 MMOL/L (ref 8–16)
AST SERPL-CCNC: 15 U/L (ref 10–40)
BASOPHILS # BLD AUTO: 0.05 K/UL (ref 0–0.2)
BASOPHILS NFR BLD: 1.2 % (ref 0–1.9)
BILIRUB SERPL-MCNC: 0.4 MG/DL (ref 0.1–1)
BUN SERPL-MCNC: 11 MG/DL (ref 6–20)
CALCIUM SERPL-MCNC: 9.1 MG/DL (ref 8.7–10.5)
CHLORIDE SERPL-SCNC: 113 MMOL/L (ref 95–110)
CHOLEST SERPL-MCNC: 231 MG/DL (ref 120–199)
CHOLEST/HDLC SERPL: 5.8 {RATIO} (ref 2–5)
CO2 SERPL-SCNC: 22 MMOL/L (ref 23–29)
CREAT SERPL-MCNC: 1.1 MG/DL (ref 0.5–1.4)
DIFFERENTIAL METHOD: ABNORMAL
EOSINOPHIL # BLD AUTO: 0.1 K/UL (ref 0–0.5)
EOSINOPHIL NFR BLD: 2.1 % (ref 0–8)
ERYTHROCYTE [DISTWIDTH] IN BLOOD BY AUTOMATED COUNT: 13.5 % (ref 11.5–14.5)
EST. GFR  (NO RACE VARIABLE): >60 ML/MIN/1.73 M^2
ESTIMATED AVG GLUCOSE: 103 MG/DL (ref 68–131)
GLUCOSE SERPL-MCNC: 91 MG/DL (ref 70–110)
HBA1C MFR BLD: 5.2 % (ref 4–5.6)
HCT VFR BLD AUTO: 44.6 % (ref 37–48.5)
HCV AB SERPL QL IA: NORMAL
HDLC SERPL-MCNC: 40 MG/DL (ref 40–75)
HDLC SERPL: 17.3 % (ref 20–50)
HGB BLD-MCNC: 14.2 G/DL (ref 12–16)
IMM GRANULOCYTES # BLD AUTO: 0 K/UL (ref 0–0.04)
IMM GRANULOCYTES NFR BLD AUTO: 0 % (ref 0–0.5)
LDLC SERPL CALC-MCNC: 154 MG/DL (ref 63–159)
LYMPHOCYTES # BLD AUTO: 2 K/UL (ref 1–4.8)
LYMPHOCYTES NFR BLD: 46.2 % (ref 18–48)
MCH RBC QN AUTO: 26.5 PG (ref 27–31)
MCHC RBC AUTO-ENTMCNC: 31.8 G/DL (ref 32–36)
MCV RBC AUTO: 83 FL (ref 82–98)
MONOCYTES # BLD AUTO: 0.3 K/UL (ref 0.3–1)
MONOCYTES NFR BLD: 7.1 % (ref 4–15)
NEUTROPHILS # BLD AUTO: 1.8 K/UL (ref 1.8–7.7)
NEUTROPHILS NFR BLD: 43.4 % (ref 38–73)
NONHDLC SERPL-MCNC: 191 MG/DL
NRBC BLD-RTO: 0 /100 WBC
PLATELET # BLD AUTO: 257 K/UL (ref 150–450)
PMV BLD AUTO: 10.7 FL (ref 9.2–12.9)
POTASSIUM SERPL-SCNC: 3.9 MMOL/L (ref 3.5–5.1)
PROT SERPL-MCNC: 6.8 G/DL (ref 6–8.4)
PTH-INTACT SERPL-MCNC: 85.4 PG/ML (ref 9–77)
RBC # BLD AUTO: 5.36 M/UL (ref 4–5.4)
SODIUM SERPL-SCNC: 142 MMOL/L (ref 136–145)
TRIGL SERPL-MCNC: 185 MG/DL (ref 30–150)
TSH SERPL DL<=0.005 MIU/L-ACNC: 1.1 UIU/ML (ref 0.4–4)
WBC # BLD AUTO: 4.22 K/UL (ref 3.9–12.7)

## 2023-04-12 PROCEDURE — 82306 VITAMIN D 25 HYDROXY: CPT | Performed by: FAMILY MEDICINE

## 2023-04-12 PROCEDURE — 36415 COLL VENOUS BLD VENIPUNCTURE: CPT | Mod: PN | Performed by: FAMILY MEDICINE

## 2023-04-12 PROCEDURE — 80061 LIPID PANEL: CPT | Performed by: FAMILY MEDICINE

## 2023-04-12 PROCEDURE — 80053 COMPREHEN METABOLIC PANEL: CPT | Performed by: FAMILY MEDICINE

## 2023-04-12 PROCEDURE — 86803 HEPATITIS C AB TEST: CPT | Performed by: FAMILY MEDICINE

## 2023-04-12 PROCEDURE — 83970 ASSAY OF PARATHORMONE: CPT | Performed by: FAMILY MEDICINE

## 2023-04-12 PROCEDURE — 84443 ASSAY THYROID STIM HORMONE: CPT | Performed by: FAMILY MEDICINE

## 2023-04-12 PROCEDURE — 85025 COMPLETE CBC W/AUTO DIFF WBC: CPT | Performed by: FAMILY MEDICINE

## 2023-04-12 PROCEDURE — 87389 HIV-1 AG W/HIV-1&-2 AB AG IA: CPT | Performed by: FAMILY MEDICINE

## 2023-04-12 PROCEDURE — 83036 HEMOGLOBIN GLYCOSYLATED A1C: CPT | Performed by: FAMILY MEDICINE

## 2023-04-13 LAB — HIV 1+2 AB+HIV1 P24 AG SERPL QL IA: NORMAL

## 2023-04-14 ENCOUNTER — PATIENT MESSAGE (OUTPATIENT)
Dept: ADMINISTRATIVE | Facility: HOSPITAL | Age: 53
End: 2023-04-14
Payer: COMMERCIAL

## 2023-04-16 PROBLEM — E66.811 CLASS 1 OBESITY DUE TO EXCESS CALORIES WITH SERIOUS COMORBIDITY AND BODY MASS INDEX (BMI) OF 32.0 TO 32.9 IN ADULT: Chronic | Status: ACTIVE | Noted: 2021-09-01

## 2023-04-16 PROBLEM — E55.9 VITAMIN D DEFICIENCY: Status: ACTIVE | Noted: 2023-04-16

## 2023-04-16 PROBLEM — E66.09 CLASS 1 OBESITY DUE TO EXCESS CALORIES WITH SERIOUS COMORBIDITY AND BODY MASS INDEX (BMI) OF 32.0 TO 32.9 IN ADULT: Chronic | Status: ACTIVE | Noted: 2021-09-01

## 2023-04-16 PROBLEM — R73.9 HYPERGLYCEMIA: Chronic | Status: ACTIVE | Noted: 2018-01-19

## 2023-04-16 PROBLEM — I10 BENIGN HYPERTENSION: Chronic | Status: ACTIVE | Noted: 2023-04-10

## 2023-04-16 PROBLEM — E78.2 MIXED DYSLIPIDEMIA: Chronic | Status: ACTIVE | Noted: 2019-01-10

## 2023-04-16 PROBLEM — E55.9 VITAMIN D DEFICIENCY: Chronic | Status: ACTIVE | Noted: 2023-04-16

## 2023-04-17 DIAGNOSIS — Z12.11 SCREENING FOR COLON CANCER: ICD-10-CM

## 2023-04-20 ENCOUNTER — PATIENT MESSAGE (OUTPATIENT)
Dept: FAMILY MEDICINE | Facility: CLINIC | Age: 53
End: 2023-04-20

## 2023-04-20 ENCOUNTER — OFFICE VISIT (OUTPATIENT)
Dept: FAMILY MEDICINE | Facility: CLINIC | Age: 53
End: 2023-04-20
Payer: COMMERCIAL

## 2023-04-20 DIAGNOSIS — K21.9 GERD WITHOUT ESOPHAGITIS: Chronic | ICD-10-CM

## 2023-04-20 DIAGNOSIS — E78.2 MIXED DYSLIPIDEMIA: Chronic | ICD-10-CM

## 2023-04-20 DIAGNOSIS — M81.6 LOCALIZED OSTEOPOROSIS WITHOUT CURRENT PATHOLOGICAL FRACTURE: Chronic | ICD-10-CM

## 2023-04-20 DIAGNOSIS — Z78.0 POSTMENOPAUSAL: ICD-10-CM

## 2023-04-20 DIAGNOSIS — I10 BENIGN HYPERTENSION: Primary | Chronic | ICD-10-CM

## 2023-04-20 DIAGNOSIS — G43.709 CHRONIC MIGRAINE WITHOUT AURA WITHOUT STATUS MIGRAINOSUS, NOT INTRACTABLE: Chronic | ICD-10-CM

## 2023-04-20 PROCEDURE — 1160F PR REVIEW ALL MEDS BY PRESCRIBER/CLIN PHARMACIST DOCUMENTED: ICD-10-PCS | Mod: CPTII,95,, | Performed by: FAMILY MEDICINE

## 2023-04-20 PROCEDURE — 99214 OFFICE O/P EST MOD 30 MIN: CPT | Mod: 95,,, | Performed by: FAMILY MEDICINE

## 2023-04-20 PROCEDURE — 99214 PR OFFICE/OUTPT VISIT, EST, LEVL IV, 30-39 MIN: ICD-10-PCS | Mod: 95,,, | Performed by: FAMILY MEDICINE

## 2023-04-20 PROCEDURE — 1159F MED LIST DOCD IN RCRD: CPT | Mod: CPTII,95,, | Performed by: FAMILY MEDICINE

## 2023-04-20 PROCEDURE — 1160F RVW MEDS BY RX/DR IN RCRD: CPT | Mod: CPTII,95,, | Performed by: FAMILY MEDICINE

## 2023-04-20 PROCEDURE — 1159F PR MEDICATION LIST DOCUMENTED IN MEDICAL RECORD: ICD-10-PCS | Mod: CPTII,95,, | Performed by: FAMILY MEDICINE

## 2023-04-20 PROCEDURE — 3044F HG A1C LEVEL LT 7.0%: CPT | Mod: CPTII,95,, | Performed by: FAMILY MEDICINE

## 2023-04-20 PROCEDURE — 3044F PR MOST RECENT HEMOGLOBIN A1C LEVEL <7.0%: ICD-10-PCS | Mod: CPTII,95,, | Performed by: FAMILY MEDICINE

## 2023-04-20 RX ORDER — METOPROLOL TARTRATE 50 MG/1
50 TABLET ORAL 2 TIMES DAILY
Qty: 180 TABLET | Refills: 1 | Status: SHIPPED | OUTPATIENT
Start: 2023-04-20 | End: 2024-02-24

## 2023-04-20 RX ORDER — OMEPRAZOLE 20 MG/1
20 CAPSULE, DELAYED RELEASE ORAL DAILY
Qty: 90 CAPSULE | Refills: 0 | Status: SHIPPED | OUTPATIENT
Start: 2023-04-20

## 2023-04-20 RX ORDER — BUTALBITAL, ACETAMINOPHEN AND CAFFEINE 50; 325; 40 MG/1; MG/1; MG/1
1 TABLET ORAL EVERY 4 HOURS PRN
Qty: 30 TABLET | Refills: 0 | Status: SHIPPED | OUTPATIENT
Start: 2023-04-20 | End: 2023-04-26

## 2023-04-20 RX ORDER — ATORVASTATIN CALCIUM 20 MG/1
20 TABLET, FILM COATED ORAL DAILY
Qty: 90 TABLET | Refills: 3 | Status: SHIPPED | OUTPATIENT
Start: 2023-04-20 | End: 2024-03-21 | Stop reason: SDUPTHER

## 2023-04-20 RX ORDER — AMLODIPINE BESYLATE 5 MG/1
5 TABLET ORAL DAILY
Qty: 90 TABLET | Refills: 0 | Status: SHIPPED | OUTPATIENT
Start: 2023-04-20 | End: 2023-07-26

## 2023-04-20 NOTE — PROGRESS NOTES
Patient Name: Alondra Carrera    : 1970  MRN: [id number]    The patient location is: [place], [place]  The chief complaint leading to consultation is: HTN, results    Visit type: audiovisual    Face to Face time with patient: 38  45 minutes of total time spent on the encounter, which includes face to face time and non-face to face time preparing to see the patient (eg, review of tests), Obtaining and/or reviewing separately obtained history, Documenting clinical information in the electronic or other health record, Independently interpreting results (not separately reported) and communicating results to the patient/family/caregiver, or Care coordination (not separately reported).         Each patient to whom he or she provides medical services by telemedicine is:  (1) informed of the relationship between the physician and patient and the respective role of any other health care provider with respect to management of the patient; and (2) notified that he or she may decline to receive medical services by telemedicine and may withdraw from such care at any time.    Notes:     Subjective:     Patient ID: Alondra is a 52 y.o. female    Chief Complaint:  No chief complaint on file.    82-year-old female presents for follow-up on hypertension and to review lab results.    She reports that she is been having increased acid reflux symptoms daily recently since ran out the medications a month ago. Last EGD was in 2018    She reports that the last time she saw Neurology was around .  She states that she gets migraine episodes multiple times a month.    Hypertension  This is a recurrent problem. The current episode started more than 1 year ago. The problem has been gradually worsening since onset. The problem is resistant. Associated symptoms include anxiety, headaches and malaise/fatigue. Pertinent negatives include no blurred vision, chest pain, neck pain, orthopnea, palpitations, peripheral edema, PND,  shortness of breath or sweats. Agents associated with hypertension include anorectics and NSAIDs. Risk factors for coronary artery disease include dyslipidemia, obesity, smoking/tobacco exposure and stress. Past treatments include lifestyle changes. The current treatment provides mild improvement. Compliance problems include diet, exercise and medication side effects.       Review of Systems   Constitutional:  Positive for malaise/fatigue.   Eyes:  Negative for blurred vision.   Respiratory:  Negative for shortness of breath.    Cardiovascular:  Negative for chest pain, palpitations, orthopnea and PND.   Musculoskeletal:  Negative for neck pain.   Neurological:  Positive for headaches.      Objective:   There were no vitals taken for this visit.    Physical Exam  Pulmonary:      Effort: Pulmonary effort is normal.   Neurological:      Mental Status: She is alert.   Psychiatric:         Mood and Affect: Mood normal.         Behavior: Behavior normal.        Lab Visit on 04/12/2023   Component Date Value Ref Range Status    Sodium 04/12/2023 142  136 - 145 mmol/L Final    Potassium 04/12/2023 3.9  3.5 - 5.1 mmol/L Final    Chloride 04/12/2023 113 (H)  95 - 110 mmol/L Final    CO2 04/12/2023 22 (L)  23 - 29 mmol/L Final    Glucose 04/12/2023 91  70 - 110 mg/dL Final    BUN 04/12/2023 11  6 - 20 mg/dL Final    Creatinine 04/12/2023 1.1  0.5 - 1.4 mg/dL Final    Calcium 04/12/2023 9.1  8.7 - 10.5 mg/dL Final    Total Protein 04/12/2023 6.8  6.0 - 8.4 g/dL Final    Albumin 04/12/2023 3.7  3.5 - 5.2 g/dL Final    Total Bilirubin 04/12/2023 0.4  0.1 - 1.0 mg/dL Final    Comment: For infants and newborns, interpretation of results should be based  on gestational age, weight and in agreement with clinical  observations.    Premature Infant recommended reference ranges:  Up to 24 hours.............<8.0 mg/dL  Up to 48 hours............<12.0 mg/dL  3-5 days..................<15.0 mg/dL  6-29 days.................<15.0 mg/dL       Alkaline Phosphatase 04/12/2023 55  55 - 135 U/L Final    AST 04/12/2023 15  10 - 40 U/L Final    ALT 04/12/2023 8 (L)  10 - 44 U/L Final    Anion Gap 04/12/2023 7 (L)  8 - 16 mmol/L Final    eGFR 04/12/2023 >60  >60 mL/min/1.73 m^2 Final    Cholesterol 04/12/2023 231 (H)  120 - 199 mg/dL Final    Comment: The National Cholesterol Education Program (NCEP) has set the  following guidelines (reference ranges) for Cholesterol:  Optimal.....................<200 mg/dL  Borderline High.............200-239 mg/dL  High........................> or = 240 mg/dL      Triglycerides 04/12/2023 185 (H)  30 - 150 mg/dL Final    Comment: The National Cholesterol Education Program (NCEP) has set the  following guidelines (reference values) for triglycerides:  Normal......................<150 mg/dL  Borderline High.............150-199 mg/dL  High........................200-499 mg/dL      HDL 04/12/2023 40  40 - 75 mg/dL Final    Comment: The National Cholesterol Education Program (NCEP) has set the  following guidelines (reference values) for HDL Cholesterol:  Low...............<40 mg/dL  Optimal...........>60 mg/dL      LDL Cholesterol 04/12/2023 154.0  63.0 - 159.0 mg/dL Final    Comment: The National Cholesterol Education Program (NCEP) has set the  following guidelines (reference values) for LDL Cholesterol:  Optimal.......................<130 mg/dL  Borderline High...............130-159 mg/dL  High..........................160-189 mg/dL  Very High.....................>190 mg/dL      HDL/Cholesterol Ratio 04/12/2023 17.3 (L)  20.0 - 50.0 % Final    Total Cholesterol/HDL Ratio 04/12/2023 5.8 (H)  2.0 - 5.0 Final    Non-HDL Cholesterol 04/12/2023 191  mg/dL Final    Comment: Risk category and Non-HDL cholesterol goals:  Coronary heart disease (CHD)or equivalent (10-year risk of CHD >20%):  Non-HDL cholesterol goal     <130 mg/dL  Two or more CHD risk factors and 10-year risk of CHD <= 20%:  Non-HDL cholesterol goal     <160  mg/dL  0 to 1 CHD risk factor:  Non-HDL cholesterol goal     <190 mg/dL      WBC 04/12/2023 4.22  3.90 - 12.70 K/uL Final    RBC 04/12/2023 5.36  4.00 - 5.40 M/uL Final    Hemoglobin 04/12/2023 14.2  12.0 - 16.0 g/dL Final    Hematocrit 04/12/2023 44.6  37.0 - 48.5 % Final    MCV 04/12/2023 83  82 - 98 fL Final    MCH 04/12/2023 26.5 (L)  27.0 - 31.0 pg Final    MCHC 04/12/2023 31.8 (L)  32.0 - 36.0 g/dL Final    RDW 04/12/2023 13.5  11.5 - 14.5 % Final    Platelets 04/12/2023 257  150 - 450 K/uL Final    MPV 04/12/2023 10.7  9.2 - 12.9 fL Final    Immature Granulocytes 04/12/2023 0.0  0.0 - 0.5 % Final    Gran # (ANC) 04/12/2023 1.8  1.8 - 7.7 K/uL Final    Immature Grans (Abs) 04/12/2023 0.00  0.00 - 0.04 K/uL Final    Comment: Mild elevation in immature granulocytes is non specific and   can be seen in a variety of conditions including stress response,   acute inflammation, trauma and pregnancy. Correlation with other   laboratory and clinical findings is essential.      Lymph # 04/12/2023 2.0  1.0 - 4.8 K/uL Final    Mono # 04/12/2023 0.3  0.3 - 1.0 K/uL Final    Eos # 04/12/2023 0.1  0.0 - 0.5 K/uL Final    Baso # 04/12/2023 0.05  0.00 - 0.20 K/uL Final    nRBC 04/12/2023 0  0 /100 WBC Final    Gran % 04/12/2023 43.4  38.0 - 73.0 % Final    Lymph % 04/12/2023 46.2  18.0 - 48.0 % Final    Mono % 04/12/2023 7.1  4.0 - 15.0 % Final    Eosinophil % 04/12/2023 2.1  0.0 - 8.0 % Final    Basophil % 04/12/2023 1.2  0.0 - 1.9 % Final    Differential Method 04/12/2023 Automated   Final    Hemoglobin A1C 04/12/2023 5.2  4.0 - 5.6 % Final    Comment: ADA Screening Guidelines:  5.7-6.4%  Consistent with prediabetes  >or=6.5%  Consistent with diabetes    High levels of fetal hemoglobin interfere with the HbA1C  assay. Heterozygous hemoglobin variants (HbS, HgC, etc)do  not significantly interfere with this assay.   However, presence of multiple variants may affect accuracy.      Estimated Avg Glucose 04/12/2023 103  68 - 131  mg/dL Final    Vit D, 25-Hydroxy 04/12/2023 29 (L)  30 - 96 ng/mL Final    Comment: Vitamin D deficiency.........<10 ng/mL                              Vitamin D insufficiency......10-29 ng/mL       Vitamin D sufficiency........> or equal to 30 ng/mL  Vitamin D toxicity............>100 ng/mL      PTH, Intact 04/12/2023 85.4 (H)  9.0 - 77.0 pg/mL Final    TSH 04/12/2023 1.097  0.400 - 4.000 uIU/mL Final    HIV 1/2 Ag/Ab 04/12/2023 Non-reactive  Non-reactive Final    Hepatitis C Ab 04/12/2023 Non-reactive  Non-reactive Final        Assessment        ICD-10-CM ICD-9-CM   1. Benign hypertension  I10 401.1   2. Mixed dyslipidemia  E78.2 272.2   3. GERD without esophagitis  K21.9 530.81   4. Localized osteoporosis without current pathological fracture  M81.6 733.09   5. Postmenopausal  Z78.0 V49.81   6. Chronic migraine without aura without status migrainosus, not intractable  G43.709 346.70         Plan:     1. Benign hypertension  Assessment & Plan:  Tolerating regimen well.  Will continue Lopressor 50 milligrams twice a day, but will add Norvasc 5 milligrams daily to help improve blood pressure further.  Recheck blood pressure in the next four weeks.    Orders:  -     amLODIPine (NORVASC) 5 MG tablet; Take 1 tablet (5 mg total) by mouth once daily.  Dispense: 90 tablet; Refill: 0  -     metoprolol tartrate (LOPRESSOR) 50 MG tablet; Take 1 tablet (50 mg total) by mouth 2 (two) times daily.  Dispense: 180 tablet; Refill: 1    2. Mixed dyslipidemia  Assessment & Plan:  The 10-year ASCVD risk score (Vishnu GRUBBS, et al., 2019) is: 7.5%    Values used to calculate the score:      Age: 52 years      Sex: Female      Is Non- : Yes      Diabetic: No      Tobacco smoker: No      Systolic Blood Pressure: 132 mmHg      Is BP treated: Yes      HDL Cholesterol: 40 mg/dL      Total Cholesterol: 231 mg/dL     A stone ASCVD risk score will start on atorvastatin 40 milligrams daily and re-evaluate labs in four to  six weeks.    Orders:  -     atorvastatin (LIPITOR) 20 MG tablet; Take 1 tablet (20 mg total) by mouth once daily.  Dispense: 90 tablet; Refill: 3  -     Hepatic Function Panel; Future; Expected date: 05/20/2023  -     Lipid Panel; Future; Expected date: 05/20/2023    3. GERD without esophagitis  Assessment & Plan:  Continue Prilosec as needed.  Will get GI evaluation    Orders:  -     omeprazole (PRILOSEC) 20 MG capsule; Take 1 capsule (20 mg total) by mouth once daily.  Dispense: 90 capsule; Refill: 0  -     Ambulatory referral/consult to Gastroenterology; Future; Expected date: 04/27/2023    4. Localized osteoporosis without current pathological fracture  Overview:  1-14-21 DEXA ([facility])  This result has an attachment that is not available.   AP SPINE -3.47   LEFT HIP -1.62    Assessment & Plan:  Will get updated bone density.    Orders:  -     DXA Bone Density Axial Skeleton 1 or more sites; Future; Expected date: 04/20/2023    5. Postmenopausal  -     DXA Bone Density Axial Skeleton 1 or more sites; Future; Expected date: 04/20/2023    6. Chronic migraine without aura without status migrainosus, not intractable  Assessment & Plan:  But refill requested the uterus at one time.  Discussed implications of chronic use of Fioricet and this was discouraged.  Advised on Neurology evaluation to discuss other preventative options.    Orders:  -     Ambulatory referral/consult to Neurology; Future; Expected date: 04/27/2023  -     butalbital-acetaminophen-caffeine -40 mg (FIORICET, ESGIC) -40 mg per tablet; Take 1 tablet by mouth every 4 (four) hours as needed for Headaches.  Dispense: 30 tablet; Refill: 0           -Karson Jackson Jr., MD, A[facility]IV[facility]      This office note has been dictated.  This dictation has been generated using M-Modal Fluency Direct dictation; some phonetic errors may occur.         Patient Instructions   Please call referral team at [phone] to schedule  referrals            No follow-ups on file.

## 2023-04-23 PROBLEM — G43.909 MIGRAINE: Chronic | Status: ACTIVE | Noted: 2018-12-28

## 2023-04-23 PROBLEM — K21.9 GERD WITHOUT ESOPHAGITIS: Chronic | Status: ACTIVE | Noted: 2018-12-28

## 2023-04-23 PROBLEM — M81.6 LOCALIZED OSTEOPOROSIS WITHOUT CURRENT PATHOLOGICAL FRACTURE: Chronic | Status: ACTIVE | Noted: 2023-04-23

## 2023-04-24 NOTE — ASSESSMENT & PLAN NOTE
Tolerating regimen well.  Will continue Lopressor 50 milligrams twice a day, but will add Norvasc 5 milligrams daily to help improve blood pressure further.  Recheck blood pressure in the next four weeks.

## 2023-04-24 NOTE — ASSESSMENT & PLAN NOTE
But refill requested the uterus at one time.  Discussed implications of chronic use of Fioricet and this was discouraged.  Advised on Neurology evaluation to discuss other preventative options.

## 2023-04-24 NOTE — ASSESSMENT & PLAN NOTE
The 10-year ASCVD risk score (Vishnu GRUBBS, et al., 2019) is: 7.5%    Values used to calculate the score:      Age: 52 years      Sex: Female      Is Non- : Yes      Diabetic: No      Tobacco smoker: No      Systolic Blood Pressure: 132 mmHg      Is BP treated: Yes      HDL Cholesterol: 40 mg/dL      Total Cholesterol: 231 mg/dL     A stone ASCVD risk score will start on atorvastatin 40 milligrams daily and re-evaluate labs in four to six weeks.

## 2023-04-26 ENCOUNTER — OFFICE VISIT (OUTPATIENT)
Dept: NEUROLOGY | Facility: CLINIC | Age: 53
End: 2023-04-26
Payer: COMMERCIAL

## 2023-04-26 VITALS
WEIGHT: 206.81 LBS | HEART RATE: 77 BPM | HEIGHT: 67 IN | DIASTOLIC BLOOD PRESSURE: 72 MMHG | BODY MASS INDEX: 32.46 KG/M2 | SYSTOLIC BLOOD PRESSURE: 116 MMHG

## 2023-04-26 DIAGNOSIS — R51.9 HEADACHE, UNSPECIFIED HEADACHE TYPE: Primary | ICD-10-CM

## 2023-04-26 DIAGNOSIS — R93.0 ABNORMAL HEAD CT: ICD-10-CM

## 2023-04-26 DIAGNOSIS — G43.709 CHRONIC MIGRAINE WITHOUT AURA WITHOUT STATUS MIGRAINOSUS, NOT INTRACTABLE: Chronic | ICD-10-CM

## 2023-04-26 PROCEDURE — 1159F MED LIST DOCD IN RCRD: CPT | Mod: CPTII,S$GLB,, | Performed by: STUDENT IN AN ORGANIZED HEALTH CARE EDUCATION/TRAINING PROGRAM

## 2023-04-26 PROCEDURE — 3044F PR MOST RECENT HEMOGLOBIN A1C LEVEL <7.0%: ICD-10-PCS | Mod: CPTII,S$GLB,, | Performed by: STUDENT IN AN ORGANIZED HEALTH CARE EDUCATION/TRAINING PROGRAM

## 2023-04-26 PROCEDURE — 3008F BODY MASS INDEX DOCD: CPT | Mod: CPTII,S$GLB,, | Performed by: STUDENT IN AN ORGANIZED HEALTH CARE EDUCATION/TRAINING PROGRAM

## 2023-04-26 PROCEDURE — 3074F PR MOST RECENT SYSTOLIC BLOOD PRESSURE < 130 MM HG: ICD-10-PCS | Mod: CPTII,S$GLB,, | Performed by: STUDENT IN AN ORGANIZED HEALTH CARE EDUCATION/TRAINING PROGRAM

## 2023-04-26 PROCEDURE — 1159F PR MEDICATION LIST DOCUMENTED IN MEDICAL RECORD: ICD-10-PCS | Mod: CPTII,S$GLB,, | Performed by: STUDENT IN AN ORGANIZED HEALTH CARE EDUCATION/TRAINING PROGRAM

## 2023-04-26 PROCEDURE — 99999 PR PBB SHADOW E&M-EST. PATIENT-LVL III: CPT | Mod: PBBFAC,,, | Performed by: STUDENT IN AN ORGANIZED HEALTH CARE EDUCATION/TRAINING PROGRAM

## 2023-04-26 PROCEDURE — 3078F PR MOST RECENT DIASTOLIC BLOOD PRESSURE < 80 MM HG: ICD-10-PCS | Mod: CPTII,S$GLB,, | Performed by: STUDENT IN AN ORGANIZED HEALTH CARE EDUCATION/TRAINING PROGRAM

## 2023-04-26 PROCEDURE — 3074F SYST BP LT 130 MM HG: CPT | Mod: CPTII,S$GLB,, | Performed by: STUDENT IN AN ORGANIZED HEALTH CARE EDUCATION/TRAINING PROGRAM

## 2023-04-26 PROCEDURE — 99204 OFFICE O/P NEW MOD 45 MIN: CPT | Mod: S$GLB,,, | Performed by: STUDENT IN AN ORGANIZED HEALTH CARE EDUCATION/TRAINING PROGRAM

## 2023-04-26 PROCEDURE — 3078F DIAST BP <80 MM HG: CPT | Mod: CPTII,S$GLB,, | Performed by: STUDENT IN AN ORGANIZED HEALTH CARE EDUCATION/TRAINING PROGRAM

## 2023-04-26 PROCEDURE — 3044F HG A1C LEVEL LT 7.0%: CPT | Mod: CPTII,S$GLB,, | Performed by: STUDENT IN AN ORGANIZED HEALTH CARE EDUCATION/TRAINING PROGRAM

## 2023-04-26 PROCEDURE — 99204 PR OFFICE/OUTPT VISIT, NEW, LEVL IV, 45-59 MIN: ICD-10-PCS | Mod: S$GLB,,, | Performed by: STUDENT IN AN ORGANIZED HEALTH CARE EDUCATION/TRAINING PROGRAM

## 2023-04-26 PROCEDURE — 99999 PR PBB SHADOW E&M-EST. PATIENT-LVL III: ICD-10-PCS | Mod: PBBFAC,,, | Performed by: STUDENT IN AN ORGANIZED HEALTH CARE EDUCATION/TRAINING PROGRAM

## 2023-04-26 PROCEDURE — 3008F PR BODY MASS INDEX (BMI) DOCUMENTED: ICD-10-PCS | Mod: CPTII,S$GLB,, | Performed by: STUDENT IN AN ORGANIZED HEALTH CARE EDUCATION/TRAINING PROGRAM

## 2023-04-26 RX ORDER — LANOLIN ALCOHOL/MO/W.PET/CERES
400 CREAM (GRAM) TOPICAL DAILY
Qty: 30 TABLET | Refills: 5 | Status: SHIPPED | OUTPATIENT
Start: 2023-04-26 | End: 2023-10-23

## 2023-04-26 NOTE — PROGRESS NOTES
Chief Complaint and Duration     Headache in December 2022    History of Present Illness     Alondra Carrera is a 52 y.o. female with a history of multiple medical diagnoses as listed below that presents for evaluation of headaches that started in December 2022.    Has hx of migraines in which she saw Dr Melgar at [facility] for prior - sensitive to noise, light. Last saw him prior to beginning of the pandemic.    States she had a mechanical fall back in December, hit her head. Did not LOC. Head CT was done which showed no bleed or acute intracranial process. Did show bilatearl basal ganglia calcifications.    Denies migraines at this time, intermittent headache that may be positional related / tension / poor sleep / related to food intake. Has them intermittently. Was given prescription fioricet prior in ER.    Otherwise no other illnesses.  Takes aspirin, statin.     Review of patient's allergies indicates:   Allergen Reactions    Sulfa (sulfonamide antibiotics) Swelling     Swelling of Eyes and Lips     Current Outpatient Medications   Medication Sig Dispense Refill    amLODIPine (NORVASC) 5 MG tablet Take 1 tablet (5 mg total) by mouth once daily. 90 tablet 0    aspirin (ECOTRIN) 325 MG EC tablet Take 325 mg by mouth.      atorvastatin (LIPITOR) 20 MG tablet Take 1 tablet (20 mg total) by mouth once daily. 90 tablet 3    ibuprofen (ADVIL,MOTRIN) 600 MG tablet PLEASE SEE ATTACHED FOR DETAILED DIRECTIONS      magnesium oxide (MAG-OX) 400 mg (241.3 mg magnesium) tablet Take 1 tablet (400 mg total) by mouth once daily. 30 tablet 5    metoprolol tartrate (LOPRESSOR) 50 MG tablet Take 1 tablet (50 mg total) by mouth 2 (two) times daily. 180 tablet 1    montelukast (SINGULAIR) 10 mg tablet Take 10 mg by mouth every evening.      omeprazole (PRILOSEC) 20 MG capsule Take 1 capsule (20 mg total) by mouth once daily. 90 capsule 0    semaglutide (OZEMPIC) 0.25 mg or 0.5 mg(2 mg/1.5 mL) pen injector Inject 0.25 mg into the  skin once a week. [facility], Dr. Quinn Casas       No current facility-administered medications for this visit.       Medical History     Past Medical History:   Diagnosis Date    Genital herpes, unspecified     Hypertension     Mental disorder     Migraines      Past Surgical History:   Procedure Laterality Date    ECTOPIC PREGNANCY SURGERY      EXPLORATORY LAPAROTOMY W/ BOWEL RESECTION      during ectopic pregnancy, bowel was perforated and needed partial resection    MITRAL VALVE REPAIR      NASAL TURBINATE REDUCTION      TUBAL LIGATION       Family History   Problem Relation Age of Onset    Breast cancer Neg Hx     Colon cancer Neg Hx     Ovarian cancer Neg Hx     COPD Neg Hx     Drug abuse Neg Hx     Hearing loss Neg Hx     Hyperlipidemia Neg Hx     Kidney disease Neg Hx      Social History     Socioeconomic History    Marital status:    Tobacco Use    Smoking status: Former     Types: Cigarettes     Start date:      Quit date:      Years since quittin.3   Substance and Sexual Activity    Alcohol use: Yes    Drug use: No    Sexual activity: Yes     Partners: Male       Exam     Vitals:    23 0716   BP: 116/72   Pulse: 77      Physical Exam:  General: She is not in acute distress. She is not ill-appearing.   HENT: Normocephalic and atraumatic. Moist mucous membranes.  Eyes: Conjunctivae normal.   Pulmonary: Pulmonary effort is normal.   Abdominal: Abdomen is soft and flat.   Skin: Skin is warm and dry. No rashes.   Psychiatric: Mood normal.        Neurologic Exam   Mental status: oriented to person, place, and time  Attention: Normal. Concentration: normal.  Speech: speech is normal.  Cranial Nerves: PERRL, EOMI intact, V1-V3 Facial sensation intact. Symmetric facies. Hearing grossly intact. Palate and uvula midline, symmetric. No tongue deviation. Trapezius strength intact.     Motor exam: bulk and tone normal. Strength 5/5 in bilateral upper extremities: deltoids,  biceps, triceps, wrist flexion/extension, finger abduction/adduction. Strength 5/5 in bilateral lower extremities: hip flexion/extension, thigh adduction/abduction, knee flexion/extension, dorsiflexion/plantarflexion, foot eversion/inversion.    Reflexes: 2+ in bilateral upper extremities: biceps and brachiaradialis, 2+ in bilateral lower extremities: patellar and achilles  Plantar reflex: normal  Temple's/Clonus: negative    Sensory exam: light touch intact    Gait exam: normal  Romberg: negative  Coordination: normal     Tremor: none    Labs and Imaging     Head CT reviewed - no acute intracranial processes. Does show bilateral basal ganglia calcifications    Assessment and Plan     Problem List Items Addressed This Visit          Neuro    Migraine (Chronic)    Headache, unspecified headache type - Primary    Relevant Medications    magnesium oxide (MAG-OX) 400 mg (241.3 mg magnesium) tablet       Other    Abnormal head CT     52 year old female, hx of chronic migraines before, not on migraine meds and controlled. Has intermittent headaches after she had fall back in December 2022 - can take NSAIDs PRN. Can also try daily magnesium. Nonfocal exam today, head CT did show bilateral basal ganglia calcifications which can be idiopathic.     Follow-up: Follow up if symptoms worsen or fail to improve.

## 2023-05-05 LAB — PAP RECOMMENDATION EXT: NORMAL

## 2023-05-13 LAB — HEMOCCULT STL QL IA: NEGATIVE

## 2023-05-16 ENCOUNTER — OFFICE VISIT (OUTPATIENT)
Dept: NEUROLOGY | Facility: CLINIC | Age: 53
End: 2023-05-16
Payer: COMMERCIAL

## 2023-05-16 DIAGNOSIS — R93.0 ABNORMAL HEAD CT: ICD-10-CM

## 2023-05-16 DIAGNOSIS — G43.709 CHRONIC MIGRAINE WITHOUT AURA WITHOUT STATUS MIGRAINOSUS, NOT INTRACTABLE: Primary | ICD-10-CM

## 2023-05-16 PROCEDURE — 3044F HG A1C LEVEL LT 7.0%: CPT | Mod: CPTII,95,, | Performed by: STUDENT IN AN ORGANIZED HEALTH CARE EDUCATION/TRAINING PROGRAM

## 2023-05-16 PROCEDURE — 99214 PR OFFICE/OUTPT VISIT, EST, LEVL IV, 30-39 MIN: ICD-10-PCS | Mod: 95,,, | Performed by: STUDENT IN AN ORGANIZED HEALTH CARE EDUCATION/TRAINING PROGRAM

## 2023-05-16 PROCEDURE — 99214 OFFICE O/P EST MOD 30 MIN: CPT | Mod: 95,,, | Performed by: STUDENT IN AN ORGANIZED HEALTH CARE EDUCATION/TRAINING PROGRAM

## 2023-05-16 PROCEDURE — 3044F PR MOST RECENT HEMOGLOBIN A1C LEVEL <7.0%: ICD-10-PCS | Mod: CPTII,95,, | Performed by: STUDENT IN AN ORGANIZED HEALTH CARE EDUCATION/TRAINING PROGRAM

## 2023-05-16 RX ORDER — UBROGEPANT 100 MG/1
100 TABLET ORAL ONCE AS NEEDED
Qty: 16 TABLET | Refills: 3 | Status: SHIPPED | OUTPATIENT
Start: 2023-05-16 | End: 2023-05-16

## 2023-05-16 RX ORDER — TOPIRAMATE 100 MG/1
100 TABLET, FILM COATED ORAL 2 TIMES DAILY
Qty: 60 TABLET | Refills: 3 | Status: SHIPPED | OUTPATIENT
Start: 2023-05-16 | End: 2024-02-20 | Stop reason: SDUPTHER

## 2023-05-16 NOTE — PROGRESS NOTES
Chief Complaint and Duration     Headache started in December 2022    History of Present Illness     Alondra Carrera is a 52 y.o. female with a history of multiple medical diagnoses as listed below that presents for evaluation of headaches that started in December 2022.    Has hx of migraines in which she saw Dr Melgar at [facility] for prior - sensitive to noise, light. Last saw him prior to beginning of the pandemic.    States she had a mechanical fall back in December, hit her head. Did not LOC. Head CT was done which showed no bleed or acute intracranial process. Did show bilatearl basal ganglia calcifications.    Denies migraines at this time, intermittent headache that may be positional related / tension / poor sleep / related to food intake. Has them intermittently. Was given prescription fioricet prior in ER.    Otherwise no other illnesses.  Takes aspirin, statin.     Interim period:  5/16/23 states continues to have headaches, migraines up to 4x/month (1x/week). Prior neurologist had patient on preventative of topiramate 50mg in AM, 100mg in PM.  Triggers include gym, infrared sauna.  Fam hx of strokes.     Review of patient's allergies indicates:   Allergen Reactions    Sulfa (sulfonamide antibiotics) Swelling     Swelling of Eyes and Lips     Current Outpatient Medications   Medication Sig Dispense Refill    amLODIPine (NORVASC) 5 MG tablet Take 1 tablet (5 mg total) by mouth once daily. 90 tablet 0    aspirin (ECOTRIN) 325 MG EC tablet Take 325 mg by mouth.      atorvastatin (LIPITOR) 20 MG tablet Take 1 tablet (20 mg total) by mouth once daily. 90 tablet 3    ibuprofen (ADVIL,MOTRIN) 600 MG tablet PLEASE SEE ATTACHED FOR DETAILED DIRECTIONS      magnesium oxide (MAG-OX) 400 mg (241.3 mg magnesium) tablet Take 1 tablet (400 mg total) by mouth once daily. 30 tablet 5    metoprolol tartrate (LOPRESSOR) 50 MG tablet Take 1 tablet (50 mg total) by mouth 2 (two) times daily. 180 tablet 1    montelukast  (SINGULAIR) 10 mg tablet Take 10 mg by mouth every evening.      omeprazole (PRILOSEC) 20 MG capsule Take 1 capsule (20 mg total) by mouth once daily. 90 capsule 0    semaglutide (OZEMPIC) 0.25 mg or 0.5 mg(2 mg/1.5 mL) pen injector Inject 0.25 mg into the skin once a week. [facility], Dr. Quinn Casas      topiramate (TOPAMAX) 100 MG tablet Take 1 tablet (100 mg total) by mouth 2 (two) times daily. 60 tablet 3    ubrogepant (UBRELVY) 100 mg tablet Take 1 tablet (100 mg total) by mouth once as needed for Migraine. Take another tablet (1 tablet 100mg) by mouth in 2 hrs if mild relief, no more than 2x/each time. 16 tablet 3     No current facility-administered medications for this visit.       Medical History     Past Medical History:   Diagnosis Date    Genital herpes, unspecified     Hypertension     Mental disorder     Migraines      Past Surgical History:   Procedure Laterality Date    ECTOPIC PREGNANCY SURGERY      EXPLORATORY LAPAROTOMY W/ BOWEL RESECTION      during ectopic pregnancy, bowel was perforated and needed partial resection    MITRAL VALVE REPAIR      NASAL TURBINATE REDUCTION  2007    TUBAL LIGATION       Family History   Problem Relation Age of Onset    Breast cancer Neg Hx     Colon cancer Neg Hx     Ovarian cancer Neg Hx     COPD Neg Hx     Drug abuse Neg Hx     Hearing loss Neg Hx     Hyperlipidemia Neg Hx     Kidney disease Neg Hx      Social History     Socioeconomic History    Marital status:    Tobacco Use    Smoking status: Former     Types: Cigarettes     Start date:      Quit date:      Years since quittin.3   Substance and Sexual Activity    Alcohol use: Yes    Drug use: No    Sexual activity: Yes     Partners: Male       Exam     There were no vitals filed for this visit.     Virtual visit    Physical Exam:  General: She is not in acute distress. She is not ill-appearing.   HENT: Normocephalic and atraumatic. Psychiatric: Mood normal.        Neurologic  Exam   Mental status: oriented to person, place, and time  Attention: Normal. Concentration: normal.  Speech: speech is normal.  Cranial Nerves: EOMI, face symmetric  Moving extremities symmetrically    Labs and Imaging     Head CT reviewed - no acute intracranial processes. Does show bilateral basal ganglia calcifications    Assessment and Plan     Problem List Items Addressed This Visit          Neuro    Migraine - Primary (Chronic)    Relevant Medications    ubrogepant (UBRELVY) 100 mg tablet    topiramate (TOPAMAX) 100 MG tablet       Other    Abnormal head CT       52 year old female, hx of chronic migraines before. Has intermittent headaches after she had fall back in December 2022 - can take NSAIDs PRN. Can also try daily magnesium. Nonfocal exam today, head CT did show bilateral basal ganglia calcifications which can be idiopathic.     4x/month. Fam hx of strokes.  Preventative - topamax 100mg BID, magnesium 400mg daily.  Abortive - ubrelvy PRN    Follow-up: 3-4 months

## 2023-05-25 ENCOUNTER — LAB VISIT (OUTPATIENT)
Dept: LAB | Facility: HOSPITAL | Age: 53
End: 2023-05-25
Attending: FAMILY MEDICINE
Payer: COMMERCIAL

## 2023-05-25 DIAGNOSIS — E78.2 MIXED DYSLIPIDEMIA: Chronic | ICD-10-CM

## 2023-05-25 LAB
ALBUMIN SERPL BCP-MCNC: 3.8 G/DL (ref 3.5–5.2)
ALP SERPL-CCNC: 57 U/L (ref 55–135)
ALT SERPL W/O P-5'-P-CCNC: 8 U/L (ref 10–44)
AST SERPL-CCNC: 15 U/L (ref 10–40)
BILIRUB DIRECT SERPL-MCNC: 0.2 MG/DL (ref 0.1–0.3)
BILIRUB SERPL-MCNC: 0.5 MG/DL (ref 0.1–1)
CHOLEST SERPL-MCNC: 133 MG/DL (ref 120–199)
CHOLEST/HDLC SERPL: 3.7 {RATIO} (ref 2–5)
HDLC SERPL-MCNC: 36 MG/DL (ref 40–75)
HDLC SERPL: 27.1 % (ref 20–50)
LDLC SERPL CALC-MCNC: 69.4 MG/DL (ref 63–159)
NONHDLC SERPL-MCNC: 97 MG/DL
PROT SERPL-MCNC: 6.9 G/DL (ref 6–8.4)
TRIGL SERPL-MCNC: 138 MG/DL (ref 30–150)

## 2023-05-25 PROCEDURE — 36415 COLL VENOUS BLD VENIPUNCTURE: CPT | Mod: PN | Performed by: FAMILY MEDICINE

## 2023-05-25 PROCEDURE — 80061 LIPID PANEL: CPT | Performed by: FAMILY MEDICINE

## 2023-05-25 PROCEDURE — 80076 HEPATIC FUNCTION PANEL: CPT | Performed by: FAMILY MEDICINE

## 2023-05-28 DIAGNOSIS — I10 BENIGN HYPERTENSION: Chronic | ICD-10-CM

## 2023-05-28 DIAGNOSIS — E78.2 MIXED DYSLIPIDEMIA: Primary | Chronic | ICD-10-CM

## 2023-05-28 DIAGNOSIS — E21.3 HYPERPARATHYROIDISM: ICD-10-CM

## 2023-05-29 ENCOUNTER — TELEPHONE (OUTPATIENT)
Dept: FAMILY MEDICINE | Facility: CLINIC | Age: 53
End: 2023-05-29
Payer: COMMERCIAL

## 2023-05-29 NOTE — TELEPHONE ENCOUNTER
----- Message from Karson Jackson Jr., MD sent at 5/28/2023  9:43 PM CDT -----  Please schedule 6 month follow up for HTN/HLD with fasting labs one week prior.

## 2023-07-20 NOTE — TELEPHONE ENCOUNTER
ergocalciferol (ERGOCALCIFEROL) 50,000 unit Cap   discontinued 4/20/2023    LOV 4/20/2023  NOV 11/14/2023

## 2023-07-20 NOTE — TELEPHONE ENCOUNTER
----- Message from Ava Beverly sent at 7/20/2023  8:29 AM CDT -----  Regarding: Refill request  .Type: RX Refill Request    Who Called:self    Have you contacted your pharmacy:no     Refill or New Rx: Refill    RX Name and Strength:Vitamin D and  montelukast (SINGULAIR) 10 mg tablet    Preferred Pharmacy with phone number:.  [facility]/pharmacy #[id number] - [place] - [address]  [address] [address]  Phone: [phone] Fax: [phone]      Local or Mail Order:local     Ordering Provider:    Would the patient rather a call back or a response via My [facility]? Call     Best Call Back Number:.[phone]      Additional Information:

## 2023-07-20 NOTE — TELEPHONE ENCOUNTER
No care due was identified.  [facility] Embedded Care Due Messages. Reference number: [id number].   7/20/2023 8:49:23 AM CDT

## 2023-07-21 RX ORDER — MONTELUKAST SODIUM 10 MG/1
10 TABLET ORAL NIGHTLY
OUTPATIENT
Start: 2023-07-21

## 2023-07-21 NOTE — TELEPHONE ENCOUNTER
I never discussed with her continued montelukast, nor never sent. As it would be a new medication from me she would need to make a virtual visit

## 2023-07-22 DIAGNOSIS — I10 BENIGN HYPERTENSION: Chronic | ICD-10-CM

## 2023-07-22 NOTE — TELEPHONE ENCOUNTER
Refill Routing Note   Medication(s) are not appropriate for processing by [facility] for the following reason(s):      New or recently adjusted medication    [facility] action(s):  Defer Care Due:  None identified            Appointments  past 12m or future 3m with PCP    Date Provider   Last Visit   4/20/2023 Karson Jackson Jr., MD   Next Visit   11/14/2023 Karson Jackson Jr., MD   ED visits in past 90 days: 0        Note composed:3:58 PM 07/22/2023

## 2023-07-24 ENCOUNTER — TELEPHONE (OUTPATIENT)
Dept: FAMILY MEDICINE | Facility: CLINIC | Age: 53
End: 2023-07-24
Payer: COMMERCIAL

## 2023-07-24 ENCOUNTER — OFFICE VISIT (OUTPATIENT)
Dept: FAMILY MEDICINE | Facility: CLINIC | Age: 53
End: 2023-07-24
Payer: COMMERCIAL

## 2023-07-24 DIAGNOSIS — E78.2 MIXED DYSLIPIDEMIA: Chronic | ICD-10-CM

## 2023-07-24 DIAGNOSIS — J31.0 PERENNIAL NON-ALLERGIC RHINITIS: Primary | ICD-10-CM

## 2023-07-24 DIAGNOSIS — I10 BENIGN HYPERTENSION: Chronic | ICD-10-CM

## 2023-07-24 PROCEDURE — 1160F PR REVIEW ALL MEDS BY PRESCRIBER/CLIN PHARMACIST DOCUMENTED: ICD-10-PCS | Mod: CPTII,95,, | Performed by: FAMILY MEDICINE

## 2023-07-24 PROCEDURE — 99214 OFFICE O/P EST MOD 30 MIN: CPT | Mod: 95,,, | Performed by: FAMILY MEDICINE

## 2023-07-24 PROCEDURE — 99214 PR OFFICE/OUTPT VISIT, EST, LEVL IV, 30-39 MIN: ICD-10-PCS | Mod: 95,,, | Performed by: FAMILY MEDICINE

## 2023-07-24 PROCEDURE — 1160F RVW MEDS BY RX/DR IN RCRD: CPT | Mod: CPTII,95,, | Performed by: FAMILY MEDICINE

## 2023-07-24 PROCEDURE — 3044F HG A1C LEVEL LT 7.0%: CPT | Mod: CPTII,95,, | Performed by: FAMILY MEDICINE

## 2023-07-24 PROCEDURE — 3044F PR MOST RECENT HEMOGLOBIN A1C LEVEL <7.0%: ICD-10-PCS | Mod: CPTII,95,, | Performed by: FAMILY MEDICINE

## 2023-07-24 PROCEDURE — 1159F PR MEDICATION LIST DOCUMENTED IN MEDICAL RECORD: ICD-10-PCS | Mod: CPTII,95,, | Performed by: FAMILY MEDICINE

## 2023-07-24 PROCEDURE — 1159F MED LIST DOCD IN RCRD: CPT | Mod: CPTII,95,, | Performed by: FAMILY MEDICINE

## 2023-07-24 RX ORDER — AZELASTINE 1 MG/ML
2 SPRAY, METERED NASAL 2 TIMES DAILY
Qty: 30 ML | Refills: 11 | Status: SHIPPED | OUTPATIENT
Start: 2023-07-24 | End: 2023-12-01

## 2023-07-24 RX ORDER — FLUTICASONE PROPIONATE 50 MCG
2 SPRAY, SUSPENSION (ML) NASAL DAILY
Qty: 16 ML | Refills: 11 | Status: SHIPPED | OUTPATIENT
Start: 2023-07-24 | End: 2023-12-01

## 2023-07-24 RX ORDER — LEVOCETIRIZINE DIHYDROCHLORIDE 5 MG/1
5 TABLET, FILM COATED ORAL NIGHTLY
Qty: 90 TABLET | Refills: 3 | Status: SHIPPED | OUTPATIENT
Start: 2023-07-24 | End: 2023-12-01

## 2023-07-24 NOTE — PROGRESS NOTES
Patient Name: Alondra Carrera    : 1970  MRN: [id number]    The patient location is: [place], [place]  The chief complaint leading to consultation is: Allergies    Visit type: audiovisual    Face to Face time with patient: 30 min  35 minutes of total time spent on the encounter, which includes face to face time and non-face to face time preparing to see the patient (eg, review of tests), Obtaining and/or reviewing separately obtained history, Documenting clinical information in the electronic or other health record, Independently interpreting results (not separately reported) and communicating results to the patient/family/caregiver, or Care coordination (not separately reported).         Each patient to whom he or she provides medical services by telemedicine is:  (1) informed of the relationship between the physician and patient and the respective role of any other health care provider with respect to management of the patient; and (2) notified that he or she may decline to receive medical services by telemedicine and may withdraw from such care at any time.    Notes:     Subjective:     Patient ID: Alondra is a 52 y.o. female    Chief Complaint:  Medication Refill    52-year-old female makes virtual visit to discuss refill for montelukast she had been prescribed by another provider previously.  She uses it for year long allergies.  She reports that it does help.       Review of Systems   Constitutional:  Negative for activity change and unexpected weight change.   HENT:  Positive for rhinorrhea. Negative for hearing loss and trouble swallowing.    Eyes:  Negative for discharge and visual disturbance.   Respiratory:  Negative for chest tightness and wheezing.    Cardiovascular:  Negative for chest pain and palpitations.   Gastrointestinal:  Positive for constipation. Negative for blood in stool, diarrhea and vomiting.   Endocrine: Negative for polydipsia and polyuria.   Genitourinary:  Negative for difficulty  urinating, dysuria, hematuria and menstrual problem.   Musculoskeletal:  Negative for arthralgias, joint swelling and neck pain.   Neurological:  Positive for headaches. Negative for weakness.   Psychiatric/Behavioral:  Negative for confusion and dysphoric mood.         Objective:   There were no vitals taken for this visit.    Physical Exam  Pulmonary:      Effort: Pulmonary effort is normal.   Neurological:      Mental Status: She is alert.   Psychiatric:         Mood and Affect: Mood normal.         Behavior: Behavior normal.        Assessment        ICD-10-CM ICD-9-CM   1. Perennial non-allergic rhinitis  J31.0 472.0   2. Benign hypertension  I10 401.1   3. Mixed dyslipidemia  E78.2 272.2         Plan:     1. Perennial non-allergic rhinitis  Assessment & Plan:  Discussed with patient black-box warning on montelukast.  Discussed options of continuing montelukast versus alternative treatment with Xyzal and dual nasal sprays.  Patient Lexapro proceed with Xyzal and dual nasal spray and will let me know if symptoms are well controlled with this or she would like to return to use of montelukast.    In Morning  Flonase 2 sprays per nostril wait 1 minute then   Astelin 2 sprays per nostril  In the evening  Astelin 2 sprays per nostril    Do above for a month then you can stop Astelin but continue Flonase to prevent symptoms from returning. If symptoms return, restart Astelin     Orders:  -     levocetirizine (XYZAL) 5 MG tablet; Take 1 tablet (5 mg total) by mouth every evening.  Dispense: 90 tablet; Refill: 3  -     fluticasone propionate (FLONASE) 50 mcg/actuation nasal spray; 2 sprays (100 mcg total) by Each Nostril route once daily.  Dispense: 16 mL; Refill: 11  -     azelastine (ASTELIN) 137 mcg (0.1 %) nasal spray; 2 sprays (274 mcg total) by Nasal route 2 (two) times daily.  Dispense: 30 mL; Refill: 11    2. Benign hypertension  Overview:  BP Readings from Last 3 Encounters:   04/26/23 116/72   04/10/23 132/74    12/20/22 129/85       Assessment & Plan:  Continue current blood pressure regimen      3. Mixed dyslipidemia  Overview:  Lab Results   Component Value Date    CHOL 133 05/25/2023    CHOL 231 (H) 04/12/2023     Lab Results   Component Value Date    HDL 36 (L) 05/25/2023    HDL 40 04/12/2023     Lab Results   Component Value Date    LDLCALC 69.4 05/25/2023    LDLCALC 154.0 04/12/2023     Lab Results   Component Value Date    TRIG 138 05/25/2023    TRIG 185 (H) 04/12/2023     Lab Results   Component Value Date    CHOLHDL 27.1 05/25/2023    CHOLHDL 17.3 (L) 04/12/2023        The 10-year ASCVD risk score (Vishnu DK, et al., 2019) is: 3.5%    Values used to calculate the score:      Age: 52 years      Sex: Female      Is Non- : Yes      Diabetic: No      Tobacco smoker: No      Systolic Blood Pressure: 119 mmHg      Is BP treated: Yes      HDL Cholesterol: 36 mg/dL      Total Cholesterol: 133 mg/dL      Assessment & Plan:  Continue atorvastatin 20 milligrams daily             -Karson Jackson Jr., MD, AAHIVS      This office note has been dictated.  This dictation has been generated using M-Modal Fluency Direct dictation; some phonetic errors may occur.         There are no Patient Instructions on file for this visit.          No follow-ups on file.

## 2023-07-25 ENCOUNTER — PATIENT MESSAGE (OUTPATIENT)
Dept: FAMILY MEDICINE | Facility: CLINIC | Age: 53
End: 2023-07-25
Payer: COMMERCIAL

## 2023-07-26 RX ORDER — AMLODIPINE BESYLATE 5 MG/1
TABLET ORAL
Qty: 30 TABLET | Refills: 0 | Status: SHIPPED | OUTPATIENT
Start: 2023-07-26 | End: 2023-08-25

## 2023-07-27 ENCOUNTER — HOSPITAL ENCOUNTER (OUTPATIENT)
Dept: RADIOLOGY | Facility: CLINIC | Age: 53
Discharge: HOME | End: 2023-07-27
Attending: FAMILY MEDICINE
Payer: COMMERCIAL

## 2023-07-27 DIAGNOSIS — M81.6 LOCALIZED OSTEOPOROSIS WITHOUT CURRENT PATHOLOGICAL FRACTURE: Chronic | ICD-10-CM

## 2023-07-27 DIAGNOSIS — Z78.0 POSTMENOPAUSAL: ICD-10-CM

## 2023-07-27 PROCEDURE — 77080 DXA BONE DENSITY AXIAL: CPT | Mod: TC,PO

## 2023-07-27 PROCEDURE — 77080 DXA BONE DENSITY AXIAL SKELETON 1 OR MORE SITES: ICD-10-PCS | Mod: 26,,, | Performed by: INTERNAL MEDICINE

## 2023-07-27 PROCEDURE — 77080 DXA BONE DENSITY AXIAL: CPT | Mod: 26,,, | Performed by: INTERNAL MEDICINE

## 2023-07-29 PROBLEM — J31.0 PERENNIAL NON-ALLERGIC RHINITIS: Chronic | Status: ACTIVE | Noted: 2018-12-28

## 2023-07-29 NOTE — ASSESSMENT & PLAN NOTE
Discussed with patient black-box warning on montelukast.  Discussed options of continuing montelukast versus alternative treatment with Xyzal and dual nasal sprays.  Patient Lexapro proceed with Xyzal and dual nasal spray and will let me know if symptoms are well controlled with this or she would like to return to use of montelukast.    In Morning  Flonase 2 sprays per nostril wait 1 minute then   Astelin 2 sprays per nostril  In the evening  Astelin 2 sprays per nostril    Do above for a month then you can stop Astelin but continue Flonase to prevent symptoms from returning. If symptoms return, restart Astelin

## 2023-08-25 DIAGNOSIS — I10 BENIGN HYPERTENSION: Chronic | ICD-10-CM

## 2023-08-25 RX ORDER — AMLODIPINE BESYLATE 5 MG/1
5 TABLET ORAL DAILY
Qty: 90 TABLET | Refills: 2 | Status: SHIPPED | OUTPATIENT
Start: 2023-08-25 | End: 2024-03-21 | Stop reason: SDUPTHER

## 2023-08-25 NOTE — TELEPHONE ENCOUNTER
No care due was identified.  [facility] Embedded Care Due Messages. Reference number: [id number].   8/25/2023 12:11:13 AM CDT

## 2023-08-25 NOTE — TELEPHONE ENCOUNTER
Refill Decision Note   Alondra Deandre  is requesting a refill authorization.  Brief Assessment and Rationale for Refill:  Approve     Medication Therapy Plan:       Medication Reconciliation Completed: No    Comments:     No Care Gaps recommended.     Note composed:12:18 PM 08/25/2023

## 2023-10-23 ENCOUNTER — TELEPHONE (OUTPATIENT)
Dept: FAMILY MEDICINE | Facility: CLINIC | Age: 53
End: 2023-10-23
Payer: COMMERCIAL

## 2023-11-07 ENCOUNTER — LAB VISIT (OUTPATIENT)
Dept: LAB | Facility: HOSPITAL | Age: 53
End: 2023-11-07
Attending: FAMILY MEDICINE
Payer: COMMERCIAL

## 2023-11-07 DIAGNOSIS — E78.2 MIXED DYSLIPIDEMIA: Chronic | ICD-10-CM

## 2023-11-07 DIAGNOSIS — I10 BENIGN HYPERTENSION: Chronic | ICD-10-CM

## 2023-11-07 DIAGNOSIS — E21.3 HYPERPARATHYROIDISM: ICD-10-CM

## 2023-11-07 LAB
25(OH)D3+25(OH)D2 SERPL-MCNC: 19 NG/ML (ref 30–96)
ALBUMIN SERPL BCP-MCNC: 3.6 G/DL (ref 3.5–5.2)
ALP SERPL-CCNC: 68 U/L (ref 55–135)
ALT SERPL W/O P-5'-P-CCNC: 9 U/L (ref 10–44)
ANION GAP SERPL CALC-SCNC: 6 MMOL/L (ref 8–16)
AST SERPL-CCNC: 17 U/L (ref 10–40)
BILIRUB SERPL-MCNC: 0.5 MG/DL (ref 0.1–1)
BUN SERPL-MCNC: 10 MG/DL (ref 6–20)
CALCIUM SERPL-MCNC: 9.4 MG/DL (ref 8.7–10.5)
CHLORIDE SERPL-SCNC: 115 MMOL/L (ref 95–110)
CHOLEST SERPL-MCNC: 172 MG/DL (ref 120–199)
CHOLEST/HDLC SERPL: 4.3 {RATIO} (ref 2–5)
CO2 SERPL-SCNC: 25 MMOL/L (ref 23–29)
CREAT SERPL-MCNC: 1.1 MG/DL (ref 0.5–1.4)
EST. GFR  (NO RACE VARIABLE): >60 ML/MIN/1.73 M^2
GLUCOSE SERPL-MCNC: 90 MG/DL (ref 70–110)
HDLC SERPL-MCNC: 40 MG/DL (ref 40–75)
HDLC SERPL: 23.3 % (ref 20–50)
LDLC SERPL CALC-MCNC: 114 MG/DL (ref 63–159)
NONHDLC SERPL-MCNC: 132 MG/DL
POTASSIUM SERPL-SCNC: 4 MMOL/L (ref 3.5–5.1)
PROT SERPL-MCNC: 6.8 G/DL (ref 6–8.4)
PTH-INTACT SERPL-MCNC: 88.4 PG/ML (ref 9–77)
SODIUM SERPL-SCNC: 146 MMOL/L (ref 136–145)
TRIGL SERPL-MCNC: 90 MG/DL (ref 30–150)

## 2023-11-07 PROCEDURE — 82306 VITAMIN D 25 HYDROXY: CPT | Performed by: FAMILY MEDICINE

## 2023-11-07 PROCEDURE — 80053 COMPREHEN METABOLIC PANEL: CPT | Performed by: FAMILY MEDICINE

## 2023-11-07 PROCEDURE — 80061 LIPID PANEL: CPT | Performed by: FAMILY MEDICINE

## 2023-11-07 PROCEDURE — 36415 COLL VENOUS BLD VENIPUNCTURE: CPT | Mod: PN | Performed by: FAMILY MEDICINE

## 2023-11-07 PROCEDURE — 83970 ASSAY OF PARATHORMONE: CPT | Performed by: FAMILY MEDICINE

## 2023-11-15 ENCOUNTER — OFFICE VISIT (OUTPATIENT)
Dept: FAMILY MEDICINE | Facility: CLINIC | Age: 53
End: 2023-11-15
Payer: COMMERCIAL

## 2023-11-15 VITALS
HEIGHT: 67 IN | DIASTOLIC BLOOD PRESSURE: 78 MMHG | BODY MASS INDEX: 29.9 KG/M2 | OXYGEN SATURATION: 96 % | TEMPERATURE: 98 F | SYSTOLIC BLOOD PRESSURE: 120 MMHG | HEART RATE: 57 BPM | WEIGHT: 190.5 LBS

## 2023-11-15 DIAGNOSIS — E78.2 MIXED DYSLIPIDEMIA: Primary | ICD-10-CM

## 2023-11-15 DIAGNOSIS — Z12.31 BREAST CANCER SCREENING BY MAMMOGRAM: ICD-10-CM

## 2023-11-15 DIAGNOSIS — B37.2 YEAST DERMATITIS: ICD-10-CM

## 2023-11-15 DIAGNOSIS — I10 BENIGN HYPERTENSION: ICD-10-CM

## 2023-11-15 PROCEDURE — 3074F PR MOST RECENT SYSTOLIC BLOOD PRESSURE < 130 MM HG: ICD-10-PCS | Mod: CPTII,S$GLB,, | Performed by: NURSE PRACTITIONER

## 2023-11-15 PROCEDURE — 3008F PR BODY MASS INDEX (BMI) DOCUMENTED: ICD-10-PCS | Mod: CPTII,S$GLB,, | Performed by: NURSE PRACTITIONER

## 2023-11-15 PROCEDURE — 3078F PR MOST RECENT DIASTOLIC BLOOD PRESSURE < 80 MM HG: ICD-10-PCS | Mod: CPTII,S$GLB,, | Performed by: NURSE PRACTITIONER

## 2023-11-15 PROCEDURE — 1160F PR REVIEW ALL MEDS BY PRESCRIBER/CLIN PHARMACIST DOCUMENTED: ICD-10-PCS | Mod: CPTII,S$GLB,, | Performed by: NURSE PRACTITIONER

## 2023-11-15 PROCEDURE — 99214 OFFICE O/P EST MOD 30 MIN: CPT | Mod: S$GLB,,, | Performed by: NURSE PRACTITIONER

## 2023-11-15 PROCEDURE — 3044F PR MOST RECENT HEMOGLOBIN A1C LEVEL <7.0%: ICD-10-PCS | Mod: CPTII,S$GLB,, | Performed by: NURSE PRACTITIONER

## 2023-11-15 PROCEDURE — 1159F MED LIST DOCD IN RCRD: CPT | Mod: CPTII,S$GLB,, | Performed by: NURSE PRACTITIONER

## 2023-11-15 PROCEDURE — 3078F DIAST BP <80 MM HG: CPT | Mod: CPTII,S$GLB,, | Performed by: NURSE PRACTITIONER

## 2023-11-15 PROCEDURE — 99214 PR OFFICE/OUTPT VISIT, EST, LEVL IV, 30-39 MIN: ICD-10-PCS | Mod: S$GLB,,, | Performed by: NURSE PRACTITIONER

## 2023-11-15 PROCEDURE — 3044F HG A1C LEVEL LT 7.0%: CPT | Mod: CPTII,S$GLB,, | Performed by: NURSE PRACTITIONER

## 2023-11-15 PROCEDURE — 1159F PR MEDICATION LIST DOCUMENTED IN MEDICAL RECORD: ICD-10-PCS | Mod: CPTII,S$GLB,, | Performed by: NURSE PRACTITIONER

## 2023-11-15 PROCEDURE — 99999 PR PBB SHADOW E&M-EST. PATIENT-LVL V: ICD-10-PCS | Mod: PBBFAC,,, | Performed by: NURSE PRACTITIONER

## 2023-11-15 PROCEDURE — 3074F SYST BP LT 130 MM HG: CPT | Mod: CPTII,S$GLB,, | Performed by: NURSE PRACTITIONER

## 2023-11-15 PROCEDURE — 99999 PR PBB SHADOW E&M-EST. PATIENT-LVL V: CPT | Mod: PBBFAC,,, | Performed by: NURSE PRACTITIONER

## 2023-11-15 PROCEDURE — 3008F BODY MASS INDEX DOCD: CPT | Mod: CPTII,S$GLB,, | Performed by: NURSE PRACTITIONER

## 2023-11-15 PROCEDURE — 1160F RVW MEDS BY RX/DR IN RCRD: CPT | Mod: CPTII,S$GLB,, | Performed by: NURSE PRACTITIONER

## 2023-11-15 RX ORDER — FLUCONAZOLE 150 MG/1
150 TABLET ORAL ONCE
Qty: 1 TABLET | Refills: 0 | Status: SHIPPED | OUTPATIENT
Start: 2023-11-15 | End: 2023-11-15

## 2023-11-15 RX ORDER — NYSTATIN 100000 U/G
OINTMENT TOPICAL 2 TIMES DAILY
Qty: 15 G | Refills: 0 | Status: SHIPPED | OUTPATIENT
Start: 2023-11-15 | End: 2023-12-01

## 2023-11-15 NOTE — PROGRESS NOTES
"Chief Complaint  Chief Complaint   Patient presents with    Hypertension     6mth f/u        HPI  Ms. Alondra Carrera is a 53 y.o. female with medical problems as listed below. The patient presents to clinic for 6 month follow up for hypertension. She has been doing well. With diet and has been taking medication as prescribed. She has been also trying to lower her stress as much as possible. Had blood work prior to arrival. Vitamin D 19. She has not been taking her supplements recently.    Hypertension  Pertinent negatives include no chest pain, headaches, palpitations or shortness of breath.        She has also been having as wound under her RIGHT breast that does not seem to be healing. She has had the wound for about 6 months. She has been placing neosporin on it. She states that every time it starts to heal it gets "wet" and reopens.          PAST MEDICAL HISTORY:  Past Medical History:   Diagnosis Date    Genital herpes, unspecified     Hypertension     Mental disorder     Migraines        PAST SURGICAL HISTORY:  Past Surgical History:   Procedure Laterality Date    CARDIAC VALVE REPLACEMENT  2018    Repair    ECTOPIC PREGNANCY SURGERY      EXPLORATORY LAPAROTOMY W/ BOWEL RESECTION      during ectopic pregnancy, bowel was perforated and needed partial resection    MITRAL VALVE REPAIR      NASAL TURBINATE REDUCTION  2007    TUBAL LIGATION         SOCIAL HISTORY:  Social History     Socioeconomic History    Marital status:    Tobacco Use    Smoking status: Former     Current packs/day: 0.00     Average packs/day: 0.1 packs/day for 39.1 years (2.5 ttl pk-yrs)     Types: Cigarettes     Start date:      Quit date: 2/15/2018     Years since quittin.7   Substance and Sexual Activity    Alcohol use: Not Currently    Drug use: No    Sexual activity: Not Currently     Partners: Male     Birth control/protection: Post-menopausal       FAMILY HISTORY:  Family History   Problem Relation Age of " Onset    Hearing loss Mother     Hypertension Mother     Stroke Mother     Hearing loss Father     Breast cancer Neg Hx     Colon cancer Neg Hx     Ovarian cancer Neg Hx     COPD Neg Hx     Drug abuse Neg Hx     Hyperlipidemia Neg Hx     Kidney disease Neg Hx        ALLERGIES AND MEDICATIONS: updated and reviewed.  Review of patient's allergies indicates:   Allergen Reactions    Sulfa (sulfonamide antibiotics) Swelling     Swelling of Eyes and Lips     Current Outpatient Medications   Medication Sig Dispense Refill    amLODIPine (NORVASC) 5 MG tablet Take 1 tablet (5 mg total) by mouth once daily. 90 tablet 2    aspirin (ECOTRIN) 325 MG EC tablet Take 325 mg by mouth.      atorvastatin (LIPITOR) 20 MG tablet Take 1 tablet (20 mg total) by mouth once daily. 90 tablet 3    azelastine (ASTELIN) 137 mcg (0.1 %) nasal spray 2 sprays (274 mcg total) by Nasal route 2 (two) times daily. 30 mL 11    fluticasone propionate (FLONASE) 50 mcg/actuation nasal spray 2 sprays (100 mcg total) by Each Nostril route once daily. 16 mL 11    ibuprofen (ADVIL,MOTRIN) 600 MG tablet PLEASE SEE ATTACHED FOR DETAILED DIRECTIONS      levocetirizine (XYZAL) 5 MG tablet Take 1 tablet (5 mg total) by mouth every evening. 90 tablet 3    metoprolol tartrate (LOPRESSOR) 50 MG tablet Take 1 tablet (50 mg total) by mouth 2 (two) times daily. 180 tablet 1    montelukast (SINGULAIR) 10 mg tablet Take 10 mg by mouth every evening.      omeprazole (PRILOSEC) 20 MG capsule Take 1 capsule (20 mg total) by mouth once daily. 90 capsule 0    semaglutide (OZEMPIC) 0.25 mg or 0.5 mg(2 mg/1.5 mL) pen injector Inject 0.25 mg into the skin once a week. [facility], Dr. Quinn Casas      fluconazole (DIFLUCAN) 150 MG Tab Take 1 tablet (150 mg total) by mouth once. for 1 dose 1 tablet 0    nystatin (MYCOSTATIN) ointment Apply topically 2 (two) times daily. 15 g 0    topiramate (TOPAMAX) 100 MG tablet Take 1 tablet (100 mg total) by mouth 2 (two) times daily. 60  "tablet 3     No current facility-administered medications for this visit.       Patient Care Team:  Karson Jackson Jr., MD as PCP - General  Willis Garner MD as Referring Physician (Cardiology)  Estelle Giron MD as Obstetrician (Obstetrics and Gynecology)  Emil Melgar MD as Consulting Physician (Neurology)  Brianna Ellison MD (Psychiatry)  ConnorBaylee peace as Digital Medicine Health   Ava Veliz PharmD as Hypertension Digital Medicine Clinician (Pharmacist)  Karson Jackson Jr., MD as Hypertension Digital Medicine Responsible Provider (Family Medicine)  [employer] as Hypertension Digital Medicine Contract    ROS  Review of Systems   Constitutional:  Negative for chills, fatigue, fever and unexpected weight change.   HENT:  Negative for congestion, ear discharge, ear pain and postnasal drip.    Eyes:  Negative for photophobia, pain and visual disturbance.   Respiratory:  Negative for cough, shortness of breath and wheezing.    Cardiovascular:  Negative for chest pain, palpitations and leg swelling.   Gastrointestinal:  Negative for abdominal pain, constipation, diarrhea, nausea and vomiting.   Genitourinary:  Negative for dysuria, frequency, urgency and vaginal discharge.   Musculoskeletal:  Negative for back pain, joint swelling and neck stiffness.   Skin:  Negative for rash.   Neurological:  Negative for weakness and headaches.   Psychiatric/Behavioral:  Negative for dysphoric mood and sleep disturbance. The patient is not nervous/anxious.            Physical Exam  Vitals:    11/15/23 0811   BP: 120/78   BP Location: Left arm   Patient Position: Sitting   BP Method: Medium (Manual)   Pulse: (!) 57   Temp: 98.3 °F (36.8 °C)   TempSrc: Oral   SpO2: 96%   Weight: 86.4 kg (190 lb 7.6 oz)   Height: 5' 7" (1.702 m)    Body mass index is 29.83 kg/m².  Weight: 86.4 kg (190 lb 7.6 oz)   Height: 5' 7" (170.2 cm)     Physical Exam  Constitutional:       Appearance: She is well-developed.   HENT:      " Head: Normocephalic and atraumatic.      Right Ear: External ear normal.      Left Ear: External ear normal.      Nose: Nose normal.   Eyes:      Extraocular Movements: Extraocular movements intact.   Cardiovascular:      Rate and Rhythm: Normal rate and regular rhythm.   Pulmonary:      Effort: Pulmonary effort is normal.      Breath sounds: Normal breath sounds.   Genitourinary:     Vagina: Normal.   Musculoskeletal:         General: Normal range of motion.      Cervical back: Normal range of motion.   Skin:     General: Skin is warm and dry.   Neurological:      Mental Status: She is alert and oriented to person, place, and time.   Psychiatric:         Behavior: Behavior normal.             Health Maintenance         Date Due Completion Date    Cervical Cancer Screening 01/06/2021 1/6/2020    Mammogram 05/06/2023 5/6/2022    Influenza Vaccine (1) Never done ---    COVID-19 Vaccine (3 - 2023-24 season) 09/01/2023 10/22/2021    Colorectal Cancer Screening 05/03/2024 5/3/2023    DEXA Scan 07/27/2025 7/27/2023    Hemoglobin A1c (Diabetic Prevention Screening) 04/12/2026 4/12/2023    Lipid Panel 11/07/2028 11/7/2023    TETANUS VACCINE 02/03/2031 2/3/2021          Health maintenance reviewed at this time.     Assessment & Plan  Mixed dyslipidemia  The current medical regimen is effective;  continue present plan and medications.  The patient is asked to make an attempt to improve diet and exercise patterns to aid in medical management of this problem.    Benign hypertension  The current medical regimen is effective;  continue present plan and medications.  The patient is asked to make an attempt to improve diet and exercise patterns to aid in medical management of this problem.    Breast cancer screening by mammogram  -     Mammo Digital Screening Bilat w/ [facility]; Future; Expected date: 11/15/2023    Yeast dermatitis  -     fluconazole (DIFLUCAN) 150 MG Tab; Take 1 tablet (150 mg total) by mouth once. for 1 dose   Dispense: 1 tablet; Refill: 0  -     nystatin (MYCOSTATIN) ointment; Apply topically 2 (two) times daily.  Dispense: 15 g; Refill: 0           Follow-up: Follow up in about 6 months (around 5/15/2024).

## 2023-12-01 ENCOUNTER — OFFICE VISIT (OUTPATIENT)
Dept: FAMILY MEDICINE | Facility: CLINIC | Age: 53
End: 2023-12-01
Payer: COMMERCIAL

## 2023-12-01 ENCOUNTER — PATIENT MESSAGE (OUTPATIENT)
Dept: FAMILY MEDICINE | Facility: CLINIC | Age: 53
End: 2023-12-01

## 2023-12-01 VITALS
BODY MASS INDEX: 30.07 KG/M2 | TEMPERATURE: 98 F | WEIGHT: 192 LBS | DIASTOLIC BLOOD PRESSURE: 74 MMHG | OXYGEN SATURATION: 93 % | HEART RATE: 51 BPM | SYSTOLIC BLOOD PRESSURE: 126 MMHG

## 2023-12-01 DIAGNOSIS — B37.2 CANDIDA INFECTION OF FLEXURAL SKIN: Primary | ICD-10-CM

## 2023-12-01 PROCEDURE — 3078F PR MOST RECENT DIASTOLIC BLOOD PRESSURE < 80 MM HG: ICD-10-PCS | Mod: CPTII,S$GLB,, | Performed by: INTERNAL MEDICINE

## 2023-12-01 PROCEDURE — 3008F PR BODY MASS INDEX (BMI) DOCUMENTED: ICD-10-PCS | Mod: CPTII,S$GLB,, | Performed by: INTERNAL MEDICINE

## 2023-12-01 PROCEDURE — 1159F PR MEDICATION LIST DOCUMENTED IN MEDICAL RECORD: ICD-10-PCS | Mod: CPTII,S$GLB,, | Performed by: INTERNAL MEDICINE

## 2023-12-01 PROCEDURE — 3044F PR MOST RECENT HEMOGLOBIN A1C LEVEL <7.0%: ICD-10-PCS | Mod: CPTII,S$GLB,, | Performed by: INTERNAL MEDICINE

## 2023-12-01 PROCEDURE — 1159F MED LIST DOCD IN RCRD: CPT | Mod: CPTII,S$GLB,, | Performed by: INTERNAL MEDICINE

## 2023-12-01 PROCEDURE — 3008F BODY MASS INDEX DOCD: CPT | Mod: CPTII,S$GLB,, | Performed by: INTERNAL MEDICINE

## 2023-12-01 PROCEDURE — 99213 OFFICE O/P EST LOW 20 MIN: CPT | Mod: S$GLB,,, | Performed by: INTERNAL MEDICINE

## 2023-12-01 PROCEDURE — 99999 PR PBB SHADOW E&M-EST. PATIENT-LVL III: CPT | Mod: PBBFAC,,, | Performed by: INTERNAL MEDICINE

## 2023-12-01 PROCEDURE — 99213 PR OFFICE/OUTPT VISIT, EST, LEVL III, 20-29 MIN: ICD-10-PCS | Mod: S$GLB,,, | Performed by: INTERNAL MEDICINE

## 2023-12-01 PROCEDURE — 3078F DIAST BP <80 MM HG: CPT | Mod: CPTII,S$GLB,, | Performed by: INTERNAL MEDICINE

## 2023-12-01 PROCEDURE — 3044F HG A1C LEVEL LT 7.0%: CPT | Mod: CPTII,S$GLB,, | Performed by: INTERNAL MEDICINE

## 2023-12-01 PROCEDURE — 3074F SYST BP LT 130 MM HG: CPT | Mod: CPTII,S$GLB,, | Performed by: INTERNAL MEDICINE

## 2023-12-01 PROCEDURE — 3074F PR MOST RECENT SYSTOLIC BLOOD PRESSURE < 130 MM HG: ICD-10-PCS | Mod: CPTII,S$GLB,, | Performed by: INTERNAL MEDICINE

## 2023-12-01 PROCEDURE — 99999 PR PBB SHADOW E&M-EST. PATIENT-LVL III: ICD-10-PCS | Mod: PBBFAC,,, | Performed by: INTERNAL MEDICINE

## 2023-12-01 RX ORDER — NYSTATIN 100000 [USP'U]/G
POWDER TOPICAL 2 TIMES DAILY
Qty: 60 G | Refills: 0 | Status: SHIPPED | OUTPATIENT
Start: 2023-12-01

## 2023-12-01 RX ORDER — KETOCONAZOLE 20 MG/G
CREAM TOPICAL DAILY
Qty: 60 G | Refills: 0 | Status: SHIPPED | OUTPATIENT
Start: 2023-12-01

## 2023-12-01 NOTE — ASSESSMENT & PLAN NOTE
Chronic, unresolved. Likely 2/2 fungal infection. Refer to HPI     - advised patient to try to keep the area dry and clean. She can use towels underneath her breasts to prevent skin to skin contact  - will switch to nyastatin powder BID and ketoconazole cream. Follow up in two weeks.

## 2023-12-01 NOTE — PROGRESS NOTES
Health Maintenance Due   Topic     Cervical Cancer Screening  Consult pcp    Mammogram  Pending order, pt already has appt scheduled    Influenza Vaccine (1) Pt decline    COVID-19 Vaccine (3 - 2023-24 season) Not offered at this office

## 2023-12-01 NOTE — PROGRESS NOTES
Chief Complaint: Breast Problem (Cut under right breast, pt states that the cut has been there for a few months /Pt was giving anti fungal cream in the past, says it didn't help)      Alondra Carrera  is a 53 y.o. year old  has a past medical history of Genital herpes, unspecified, Hypertension, Mental disorder, and Migraines. who presents today for a fungal infection     Patient has had a cut underneath her right breast for 6 months. She reports that she get frequent fungal infections underneath her breast. She has tried neosporin and nyastatin ointment with no improvement. She has switched to wearing a sports bra for the past two weeks as well.     Past Surgical History:   Procedure Laterality Date    CARDIAC VALVE REPLACEMENT  2018    Repair    ECTOPIC PREGNANCY SURGERY      EXPLORATORY LAPAROTOMY W/ BOWEL RESECTION      during ectopic pregnancy, bowel was perforated and needed partial resection    MITRAL VALVE REPAIR      NASAL TURBINATE REDUCTION  2007    TUBAL LIGATION          Family History   Problem Relation Age of Onset    Hearing loss Mother     Hypertension Mother     Stroke Mother     Hearing loss Father     Breast cancer Neg Hx     Colon cancer Neg Hx     Ovarian cancer Neg Hx     COPD Neg Hx     Drug abuse Neg Hx     Hyperlipidemia Neg Hx     Kidney disease Neg Hx         Social History     Socioeconomic History    Marital status:    Tobacco Use    Smoking status: Former     Current packs/day: 0.00     Average packs/day: 0.1 packs/day for 39.1 years (2.5 ttl pk-yrs)     Types: Cigarettes     Start date:      Quit date: 2/15/2018     Years since quittin.7   Substance and Sexual Activity    Alcohol use: Not Currently    Drug use: No    Sexual activity: Not Currently     Partners: Male     Birth control/protection: Post-menopausal         Current Outpatient Medications:     amLODIPine (NORVASC) 5 MG tablet, Take 1 tablet (5 mg total) by mouth once daily., Disp: 90 tablet,  Rfl: 2    aspirin (ECOTRIN) 325 MG EC tablet, Take 325 mg by mouth., Disp: , Rfl:     atorvastatin (LIPITOR) 20 MG tablet, Take 1 tablet (20 mg total) by mouth once daily., Disp: 90 tablet, Rfl: 3    ibuprofen (ADVIL,MOTRIN) 600 MG tablet, PLEASE SEE ATTACHED FOR DETAILED DIRECTIONS, Disp: , Rfl:     metoprolol tartrate (LOPRESSOR) 50 MG tablet, Take 1 tablet (50 mg total) by mouth 2 (two) times daily., Disp: 180 tablet, Rfl: 1    omeprazole (PRILOSEC) 20 MG capsule, Take 1 capsule (20 mg total) by mouth once daily., Disp: 90 capsule, Rfl: 0    topiramate (TOPAMAX) 100 MG tablet, Take 1 tablet (100 mg total) by mouth 2 (two) times daily., Disp: 60 tablet, Rfl: 3    ketoconazole (NIZORAL) 2 % cream, Apply topically once daily., Disp: 60 g, Rfl: 0    montelukast (SINGULAIR) 10 mg tablet, Take 10 mg by mouth every evening., Disp: , Rfl:     nystatin (MYCOSTATIN) powder, Apply topically 2 (two) times daily., Disp: 60 g, Rfl: 0    semaglutide (OZEMPIC) 0.25 mg or 0.5 mg(2 mg/1.5 mL) pen injector, Inject 0.25 mg into the skin once a week. [facility], Dr. Quinn Casas, Disp: , Rfl:      Review of Systems   Constitutional:  Negative for chills and fever.   Skin:  Positive for rash.        Objective:      Vitals:    12/01/23 1541   BP: 126/74   Pulse: (!) 51   Temp: 97.9 °F (36.6 °C)       Physical Exam  Constitutional:       Appearance: Normal appearance.   Skin:     Findings: Rash present.   Neurological:      Mental Status: She is alert.          Above photo of right breast. No lesions under the left breast. There is a small rash on the medial aspect of the left breast.     Assessment:       1. Candida infection of flexural skin          Plan:   1. Candida infection of flexural skin  Assessment & Plan:  Chronic, unresolved. Likely 2/2 fungal infection. Refer to HPI     - advised patient to try to keep the area dry and clean. She can use towels underneath her breasts to prevent skin to skin contact  - will switch to  nyastatin powder BID and ketoconazole cream. Follow up in two weeks.    Orders:  -     nystatin (MYCOSTATIN) powder; Apply topically 2 (two) times daily.  Dispense: 60 g; Refill: 0  -     ketoconazole (NIZORAL) 2 % cream; Apply topically once daily.  Dispense: 60 g; Refill: 0         Follow up in about 2 weeks (around 12/15/2023) for candida breast.

## 2023-12-29 ENCOUNTER — HOSPITAL ENCOUNTER (OUTPATIENT)
Dept: RADIOLOGY | Facility: HOSPITAL | Age: 53
Discharge: HOME | End: 2023-12-29
Attending: NURSE PRACTITIONER
Payer: COMMERCIAL

## 2023-12-29 DIAGNOSIS — Z12.31 BREAST CANCER SCREENING BY MAMMOGRAM: ICD-10-CM

## 2023-12-29 PROCEDURE — 77063 BREAST TOMOSYNTHESIS BI: CPT | Mod: 26,,, | Performed by: RADIOLOGY

## 2023-12-29 PROCEDURE — 77067 SCR MAMMO BI INCL CAD: CPT | Mod: TC

## 2023-12-29 PROCEDURE — 77067 SCR MAMMO BI INCL CAD: CPT | Mod: 26,,, | Performed by: RADIOLOGY

## 2023-12-29 PROCEDURE — 77067 MAMMO DIGITAL SCREENING BILAT WITH TOMO: ICD-10-PCS | Mod: 26,,, | Performed by: RADIOLOGY

## 2023-12-29 PROCEDURE — 77063 MAMMO DIGITAL SCREENING BILAT WITH TOMO: ICD-10-PCS | Mod: 26,,, | Performed by: RADIOLOGY

## 2024-01-17 ENCOUNTER — ON-DEMAND VIRTUAL (OUTPATIENT)
Dept: URGENT CARE | Facility: CLINIC | Age: 54
End: 2024-01-17
Payer: COMMERCIAL

## 2024-01-17 DIAGNOSIS — B34.9 VIRAL ILLNESS: Primary | ICD-10-CM

## 2024-01-17 PROCEDURE — 99202 OFFICE O/P NEW SF 15 MIN: CPT | Mod: 95,,, | Performed by: NURSE PRACTITIONER

## 2024-01-17 RX ORDER — OSELTAMIVIR PHOSPHATE 75 MG/1
75 CAPSULE ORAL 2 TIMES DAILY
Qty: 10 CAPSULE | Refills: 0 | Status: SHIPPED | OUTPATIENT
Start: 2024-01-17 | End: 2024-01-22

## 2024-01-17 NOTE — PROGRESS NOTES
Subjective:      Patient ID: Alondra Carrera is a 53 y.o. female.    Vitals:  vitals were not taken for this visit.     Chief Complaint: Influenza      Visit Type: TELE AUDIOVISUAL    Present with the patient at the time of consultation: TELEMED PRESENT WITH PATIENT: None    Past Medical History:   Diagnosis Date    Genital herpes, unspecified     Hypertension     Mental disorder     Migraines      Past Surgical History:   Procedure Laterality Date    CARDIAC VALVE REPLACEMENT  2/2018    Repair    ECTOPIC PREGNANCY SURGERY  2008    EXPLORATORY LAPAROTOMY W/ BOWEL RESECTION  2008    during ectopic pregnancy, bowel was perforated and needed partial resection    MITRAL VALVE REPAIR  2018    NASAL TURBINATE REDUCTION  2007    TUBAL LIGATION       Review of patient's allergies indicates:   Allergen Reactions    Sulfa (sulfonamide antibiotics) Swelling     Swelling of Eyes and Lips     Current Outpatient Medications on File Prior to Visit   Medication Sig Dispense Refill    amLODIPine (NORVASC) 5 MG tablet Take 1 tablet (5 mg total) by mouth once daily. 90 tablet 2    aspirin (ECOTRIN) 325 MG EC tablet Take 325 mg by mouth.      atorvastatin (LIPITOR) 20 MG tablet Take 1 tablet (20 mg total) by mouth once daily. 90 tablet 3    ibuprofen (ADVIL,MOTRIN) 600 MG tablet PLEASE SEE ATTACHED FOR DETAILED DIRECTIONS      ketoconazole (NIZORAL) 2 % cream Apply topically once daily. 60 g 0    metoprolol tartrate (LOPRESSOR) 50 MG tablet Take 1 tablet (50 mg total) by mouth 2 (two) times daily. 180 tablet 1    nystatin (MYCOSTATIN) powder Apply topically 2 (two) times daily. 60 g 0    omeprazole (PRILOSEC) 20 MG capsule Take 1 capsule (20 mg total) by mouth once daily. 90 capsule 0    topiramate (TOPAMAX) 100 MG tablet Take 1 tablet (100 mg total) by mouth 2 (two) times daily. 60 tablet 3    [DISCONTINUED] montelukast (SINGULAIR) 10 mg tablet Take 10 mg by mouth every evening.      [DISCONTINUED] semaglutide (OZEMPIC) 0.25 mg or  0.5 mg(2 mg/1.5 mL) pen injector Inject 0.25 mg into the skin once a week. [facility], Dr. Quinn Casas       No current facility-administered medications on file prior to visit.     Family History   Problem Relation Age of Onset    Hearing loss Mother     Hypertension Mother     Stroke Mother     Hearing loss Father     Breast cancer Neg Hx     Colon cancer Neg Hx     Ovarian cancer Neg Hx     COPD Neg Hx     Drug abuse Neg Hx     Hyperlipidemia Neg Hx     Kidney disease Neg Hx        Medications Ordered                [facility]/pharmacy #[id number] - [place] - [address]   [address] [address]    Telephone: [phone]   Fax: [phone]   Hours: Not open 24 hours                         E-Prescribed (1 of 1)              oseltamivir (TAMIFLU) 75 MG capsule    Sig: Take 1 capsule (75 mg total) by mouth 2 (two) times daily. for 5 days       Start: 1/17/24     Quantity: 10 capsule Refills: 0                           Ohs Peq Odvv Intake    1/17/2024  7:59 AM CST - Filed by Patient   What is your current physical address in the event of a medical emergency? [address] [place] [address]   Are you able to take your vital signs? Yes   Systolic Blood Pressure: 112   Diastolic Blood Pressure: 85   Weight: 190   Height: 67   Pulse: 99   Temperature:    Respiration rate:    Pulse Oxygen:    Please attach any relevant images or files          2 days of flu-like symptoms. +sick contacts. Cough, sore throat, sneezing, body aches and diarrhea. COVID negative. Taking OTC meds with little relief.    Influenza  Associated symptoms include congestion, coughing, fatigue, myalgias and a sore throat. Pertinent negatives include no chills or fever.       Constitution: Positive for fatigue. Negative for chills and fever.   HENT:  Positive for congestion and sore throat.    Respiratory:  Positive for cough. Negative for shortness of breath and wheezing.    Musculoskeletal:  Positive for muscle ache.    Allergic/Immunologic: Positive for sneezing.        Objective:   The physical exam was conducted virtually.  Physical Exam   Constitutional: She is oriented to person, place, and time. She does not appear ill. No distress.   HENT:   Head: Normocephalic and atraumatic.   Nose: Nose normal.   Eyes: Extraocular movement intact   Pulmonary/Chest: Effort normal.   Abdominal: Normal appearance.   Musculoskeletal: Normal range of motion.         General: Normal range of motion.   Neurological: no focal deficit. She is alert and oriented to person, place, and time.   Psychiatric: Her behavior is normal. Mood normal.   Vitals reviewed.      Assessment:     1. Viral illness        Plan:   Further testing recommended.  Patient encouraged to monitor symptoms closely and instructed to follow-up for new or worsening symptoms. Further, in-person, evaluation may be necessary for continued treatment. Please follow up with your primary care doctor or specialist as needed. Verbally discussed plan. Patient confirms understanding and is in agreement with treatment and plan.     You must understand that you've received a [facility] Care evaluation only and that you may be released before all your medical problems are known or treated. You, the patient, will arrange for follow up care as instructed.      Viral illness  -     oseltamivir (TAMIFLU) 75 MG capsule; Take 1 capsule (75 mg total) by mouth 2 (two) times daily. for 5 days  Dispense: 10 capsule; Refill: 0      Patient Instructions   OVER THE COUNTER RECOMMENDATIONS/SUGGESTIONS (IF NO CONTRAINDICATIONS).     ·         Make sure to stay well hydrated.     ·         Use Nasal Saline to mechanically move any post nasal drip from your eustachian tube or from the back of your throat.     ·         Use warm saltwater gargles to ease your throat pain. Warm saltwater gargles as needed for sore throat-  1/2 tsp salt to 1 cup warm water, gargle as desired. Warm fluids tend to relieve a sore  throat.     .         Throat lozenges, Chloraseptic spray or other over the counter treatments are ok to use as well. Use as directed.     ·         Use an antihistamine such as Claritin, Zyrtec or Allegra to dry you out.     ·         Use pseudoephedrine (behind the counter) to decongest. Pseudoephedrine  30 mg up to 240 mg /day. It can raise your blood pressure and give you palpitations.     ·         Use Mucinex (guaifenesin) to break up mucous up to 2400mg/day to loosen any mucous.     ·         The Mucinex DM pill has a cough suppressant that can be sedating. It can be used at night to stop the tickle at the back of your throat.     ·         You can use Mucinex D (it has guaifenesin and a high dose of pseudoephedrine) in the mornings to help decongest.     ·         Use Afrin (oxymetazoline) in each nare for no longer than 3 days, as it is addictive. It can also dry out your mucous membranes and cause elevated blood pressure. This is especially useful if you are flying.     ·         Use Flonase 1-2 sprays/nostril per day. It is a local acting steroid nasal spray, if you develop a bloody nose, stop using the medication immediately.     ·         Sometimes Nyquil at night is beneficial to help you get some rest, however it is sedating, and it does have an antihistamine, and Tylenol.     ·         Honey is a natural cough suppressant that can be used.     ·         Tylenol up to 4,000 mg a day is safe for short periods and can be used for body aches, pain, and fever. However, in high doses and prolonged use it can cause liver irritation.     ·         Ibuprofen is a non-steroidal anti-inflammatory that can be used for body aches, pain, and fever. However, it can also cause stomach irritation if overused.       Recommendations for stomach:   . Try to stay hydrated (Water, Diluted fruit juices, Gatorade, Pedialyte, Popsicles) as best as you can.      . Stick to a bland diet. Avoid spicy, greasy, fatty foods or  dairy products until symptoms improve then advance diet as tolerated.     . Pepto-Bismol, Imodium, or Metamucil over the counter as directed for diarrhea relief.     . Over the counter Probiotics, as directed, may be beneficial.

## 2024-01-17 NOTE — PATIENT INSTRUCTIONS
OVER THE COUNTER RECOMMENDATIONS/SUGGESTIONS (IF NO CONTRAINDICATIONS).     ·         Make sure to stay well hydrated.     ·         Use Nasal Saline to mechanically move any post nasal drip from your eustachian tube or from the back of your throat.     ·         Use warm saltwater gargles to ease your throat pain. Warm saltwater gargles as needed for sore throat-  1/2 tsp salt to 1 cup warm water, gargle as desired. Warm fluids tend to relieve a sore throat.     .         Throat lozenges, Chloraseptic spray or other over the counter treatments are ok to use as well. Use as directed.     ·         Use an antihistamine such as Claritin, Zyrtec or Allegra to dry you out.     ·         Use pseudoephedrine (behind the counter) to decongest. Pseudoephedrine  30 mg up to 240 mg /day. It can raise your blood pressure and give you palpitations.     ·         Use Mucinex (guaifenesin) to break up mucous up to 2400mg/day to loosen any mucous.     ·         The Mucinex DM pill has a cough suppressant that can be sedating. It can be used at night to stop the tickle at the back of your throat.     ·         You can use Mucinex D (it has guaifenesin and a high dose of pseudoephedrine) in the mornings to help decongest.     ·         Use Afrin (oxymetazoline) in each nare for no longer than 3 days, as it is addictive. It can also dry out your mucous membranes and cause elevated blood pressure. This is especially useful if you are flying.     ·         Use Flonase 1-2 sprays/nostril per day. It is a local acting steroid nasal spray, if you develop a bloody nose, stop using the medication immediately.     ·         Sometimes Nyquil at night is beneficial to help you get some rest, however it is sedating, and it does have an antihistamine, and Tylenol.     ·         Honey is a natural cough suppressant that can be used.     ·         Tylenol up to 4,000 mg a day is safe for short periods and can be used for body aches, pain, and  fever. However, in high doses and prolonged use it can cause liver irritation.     ·         Ibuprofen is a non-steroidal anti-inflammatory that can be used for body aches, pain, and fever. However, it can also cause stomach irritation if overused.       Recommendations for stomach:   . Try to stay hydrated (Water, Diluted fruit juices, Gatorade, Pedialyte, Popsicles) as best as you can.      . Stick to a bland diet. Avoid spicy, greasy, fatty foods or dairy products until symptoms improve then advance diet as tolerated.     . Pepto-Bismol, Imodium, or Metamucil over the counter as directed for diarrhea relief.     . Over the counter Probiotics, as directed, may be beneficial.

## 2024-02-12 ENCOUNTER — TELEPHONE (OUTPATIENT)
Dept: NEUROLOGY | Facility: CLINIC | Age: 54
End: 2024-02-12
Payer: COMMERCIAL

## 2024-02-12 NOTE — TELEPHONE ENCOUNTER
1st attempt to contact the patient to offer her an anppointment wioth Dr. Ellison beforer he provider can refillher medication (Topamax 100 mg)

## 2024-02-14 ENCOUNTER — TELEPHONE (OUTPATIENT)
Dept: FAMILY MEDICINE | Facility: CLINIC | Age: 54
End: 2024-02-14
Payer: COMMERCIAL

## 2024-02-14 ENCOUNTER — TELEPHONE (OUTPATIENT)
Dept: NEUROLOGY | Facility: CLINIC | Age: 54
End: 2024-02-14
Payer: COMMERCIAL

## 2024-02-14 NOTE — TELEPHONE ENCOUNTER
Called pt back and informed ov needed prior to discuss , she accept first opening 3/21 at 9:40 am

## 2024-02-14 NOTE — TELEPHONE ENCOUNTER
----- Message from Tami Bowers sent at 2/14/2024  1:26 PM CST -----  Regarding: self [phone]  Type: Patient Call Back    Who called: self     What is the request in detail pt stated she would like to see if provider treats for weight loss or if he can recommend a provider that can give her an RX for weight loss to be filled at a compound pharmacy.    Can the clinic reply by [facility]? yes    Would the patient rather a call back or a response via [facility]? Either     Best call back number: [phone]

## 2024-02-14 NOTE — TELEPHONE ENCOUNTER
"Spoke with patient, scheduled appointment with Dr. Ellison for 04/01/24, advised the patient that will send a message to the provider to try and get her medication "Topiromate 100 mg" refilled, and that if by chance the provider does not refill her medication, then she can contact her PCP to see if they will refill the medication until she comes in for a visit with the neurologist. Patient verbally understands  "

## 2024-02-20 DIAGNOSIS — G43.709 CHRONIC MIGRAINE WITHOUT AURA WITHOUT STATUS MIGRAINOSUS, NOT INTRACTABLE: ICD-10-CM

## 2024-02-20 RX ORDER — TOPIRAMATE 100 MG/1
100 TABLET, FILM COATED ORAL 2 TIMES DAILY
Qty: 60 TABLET | Refills: 1 | Status: SHIPPED | OUTPATIENT
Start: 2024-02-20 | End: 2024-04-02 | Stop reason: SDUPTHER

## 2024-02-20 NOTE — TELEPHONE ENCOUNTER
----- Message from Tripp Abrams MA sent at 2/20/2024  8:24 AM CST -----  Type: RX Refill Request    Who Called: self    Have you contacted your pharmacy:no    Refill or New Rx:refill     RX Name and Strength:    topiramate (TOPAMAX) 100 MG tablet      Preferred Pharmacy with phone number:[facility]/pharmacy #[id number] - [place] - [address]  Phone: [phone]  Fax: [phone]        Local or Mail Order:local    Ordering Provider:KARIS Ellison    Would the patient rather a call back or a response via [facility]? Yes, call     Best Call Back Number: [phone] (home)       You last saw pt on 5/16 fopr chronic migraines, f/u scheduled for 4/1.

## 2024-02-24 DIAGNOSIS — I10 BENIGN HYPERTENSION: Chronic | ICD-10-CM

## 2024-02-24 RX ORDER — METOPROLOL TARTRATE 50 MG/1
50 TABLET ORAL 2 TIMES DAILY
Qty: 60 TABLET | Refills: 1 | Status: SHIPPED | OUTPATIENT
Start: 2024-02-24

## 2024-02-24 NOTE — TELEPHONE ENCOUNTER
No care due was identified.  [facility] Embedded Care Due Messages. Reference number: [id number].   2/24/2024 6:58:08 AM CST

## 2024-02-24 NOTE — TELEPHONE ENCOUNTER
Refill Decision Note   Alondra Deandre  is requesting a refill authorization.  Brief Assessment and Rationale for Refill:  Approve     Medication Therapy Plan:         Comments:     Note composed:10:13 AM 02/24/2024

## 2024-03-15 ENCOUNTER — TELEPHONE (OUTPATIENT)
Dept: NEUROLOGY | Facility: CLINIC | Age: 54
End: 2024-03-15
Payer: COMMERCIAL

## 2024-03-21 ENCOUNTER — OFFICE VISIT (OUTPATIENT)
Dept: FAMILY MEDICINE | Facility: CLINIC | Age: 54
End: 2024-03-21
Payer: COMMERCIAL

## 2024-03-21 VITALS
OXYGEN SATURATION: 99 % | WEIGHT: 188.69 LBS | BODY MASS INDEX: 29.62 KG/M2 | HEART RATE: 64 BPM | DIASTOLIC BLOOD PRESSURE: 84 MMHG | HEIGHT: 67 IN | SYSTOLIC BLOOD PRESSURE: 110 MMHG | TEMPERATURE: 98 F

## 2024-03-21 DIAGNOSIS — E66.3 OVERWEIGHT: ICD-10-CM

## 2024-03-21 DIAGNOSIS — Z00.00 ROUTINE MEDICAL EXAM: Primary | ICD-10-CM

## 2024-03-21 DIAGNOSIS — E21.3 HYPERPARATHYROIDISM: Chronic | ICD-10-CM

## 2024-03-21 DIAGNOSIS — I10 BENIGN HYPERTENSION: Chronic | ICD-10-CM

## 2024-03-21 DIAGNOSIS — E78.2 MIXED DYSLIPIDEMIA: Chronic | ICD-10-CM

## 2024-03-21 PROCEDURE — 3079F DIAST BP 80-89 MM HG: CPT | Mod: CPTII,S$GLB,, | Performed by: FAMILY MEDICINE

## 2024-03-21 PROCEDURE — 99396 PREV VISIT EST AGE 40-64: CPT | Mod: S$GLB,,, | Performed by: FAMILY MEDICINE

## 2024-03-21 PROCEDURE — 3074F SYST BP LT 130 MM HG: CPT | Mod: CPTII,S$GLB,, | Performed by: FAMILY MEDICINE

## 2024-03-21 PROCEDURE — 1160F RVW MEDS BY RX/DR IN RCRD: CPT | Mod: CPTII,S$GLB,, | Performed by: FAMILY MEDICINE

## 2024-03-21 PROCEDURE — 99999 PR PBB SHADOW E&M-EST. PATIENT-LVL V: CPT | Mod: PBBFAC,,, | Performed by: FAMILY MEDICINE

## 2024-03-21 PROCEDURE — 1159F MED LIST DOCD IN RCRD: CPT | Mod: CPTII,S$GLB,, | Performed by: FAMILY MEDICINE

## 2024-03-21 PROCEDURE — 3008F BODY MASS INDEX DOCD: CPT | Mod: CPTII,S$GLB,, | Performed by: FAMILY MEDICINE

## 2024-03-21 RX ORDER — ATORVASTATIN CALCIUM 20 MG/1
20 TABLET, FILM COATED ORAL DAILY
Qty: 90 TABLET | Refills: 3 | Status: SHIPPED | OUTPATIENT
Start: 2024-03-21 | End: 2025-03-21

## 2024-03-21 RX ORDER — AMLODIPINE BESYLATE 5 MG/1
5 TABLET ORAL DAILY
Qty: 90 TABLET | Refills: 2 | Status: SHIPPED | OUTPATIENT
Start: 2024-03-21

## 2024-03-21 NOTE — PROGRESS NOTES
Patient Name: Alondra Carrera    : 1970  MRN: [id number]      Subjective:     Patient ID: Alondra is a 53 y.o. female    Chief Complaint:  weightloss, Annual Exam, Hypertension, and Hyperlipidemia    History of Present Illness  The patient presents for evaluation of multiple medical concerns.  Patient needs annual health maintenance exam, follow-up on hypertension, dyslipidemia, hyperparathyroidism, and is also requesting continuation of a weight loss medication    She was on semaglutide and was going to [facility] for this. She reports that she lost a lot of weight, stopped it in 2023 . She is trying to do a compound pharmacy and is wondering if she can get a prescription to do that. She started the semaglutide in 2023 and has lost about 35 pounds. She denies any side effects.    Supplemental Information  She is on amlodipine once a day, baby aspirin, atorvastatin, ibuprofen as needed, metoprolol twice a day, nystatin powder, omeprazole, and Topamax for migraine prevention. She takes ibuprofen a couple of times a month for back pain. She uses ketoconazole cream sometimes under her breasts. [facility]. She had a mammogram in 2023. She does not think she has had an influenza vaccine.      Review of Systems   Constitutional:  Negative for unexpected weight change.   HENT:  Negative for ear pain and sore throat.    Eyes:  Negative for visual disturbance.   Respiratory:  Negative for shortness of breath.    Cardiovascular:  Negative for chest pain.   Gastrointestinal:  Negative for abdominal pain and blood in stool.   Genitourinary:  Negative for dysuria and frequency.   Integumentary:  Negative for rash.   Neurological:  Negative for weakness, numbness and headaches.   Hematological:  Negative for adenopathy.   Psychiatric/Behavioral:  Negative for suicidal ideas.         Objective:   /84 (BP Location: Left arm, Patient Position: Sitting, BP  "Method: Medium (Manual))   Pulse 64   Temp 98.4 °F (36.9 °C) (Oral)   Ht 5' 7" (1.702 m)   Wt 85.6 kg (188 lb 11.4 oz)   SpO2 99%   BMI 29.56 kg/m²     Physical Exam    Physical Exam  Vitals reviewed.   Constitutional:       General: She is not in acute distress.  HENT:      Head: Normocephalic and atraumatic.      Right Ear: Ear canal and external ear normal.      Left Ear: Ear canal and external ear normal.      Nose: Nose normal.      Mouth/Throat:      Mouth: Mucous membranes are moist.      Pharynx: No oropharyngeal exudate or posterior oropharyngeal erythema.   Eyes:      Extraocular Movements: Extraocular movements intact.      Conjunctiva/sclera: Conjunctivae normal.      Pupils: Pupils are equal, round, and reactive to light.   Cardiovascular:      Rate and Rhythm: Normal rate and regular rhythm.      Pulses: Normal pulses.      Heart sounds: Normal heart sounds.   Pulmonary:      Effort: Pulmonary effort is normal. No respiratory distress.      Breath sounds: No wheezing or rales.   Abdominal:      General: Abdomen is flat. Bowel sounds are normal. There is no distension.      Palpations: Abdomen is soft.      Tenderness: There is no abdominal tenderness. There is no guarding.   Musculoskeletal:      Cervical back: Normal range of motion. No rigidity or tenderness.   Lymphadenopathy:      Cervical: No cervical adenopathy.   Skin:     General: Skin is warm.      Capillary Refill: Capillary refill takes less than 2 seconds.   Neurological:      Mental Status: She is alert and oriented to person, place, and time.      Cranial Nerves: No cranial nerve deficit.      Sensory: No sensory deficit.   Psychiatric:         Mood and Affect: Mood normal.         Behavior: Behavior normal.            Assessment        ICD-10-CM ICD-9-CM   1. Routine medical exam  Z00.00 V70.0   2. Hyperparathyroidism  E21.3 252.00   3. Benign hypertension  I10 401.1   4. Mixed dyslipidemia  E78.2 272.2   5. Overweight  E66.3 " 278.02         Plan:     1. Routine medical exam  Assessment & Plan:  Age appropriate screenings, vaccinations, and recommendations reviewed and discussed.  Appropriate orders placed and previous results reviewed.     Orders:   -     Comprehensive Metabolic Panel; Future; Expected date: 03/21/2024  -     CBC Auto Differential; Future; Expected date: 03/21/2024  -     Lipid Panel; Future; Expected date: 03/21/2024    2. Hyperparathyroidism  Overview:  Lab Results   Component Value Date    PTH 88.4 (H) 11/07/2023    PTH 85.4 (H) 04/12/2023     Lab Results   Component Value Date    CALCIUM 9.4 11/07/2023    CALCIUM 9.1 04/12/2023    CALCIUM 9.8 01/24/2018     Lab Results   Component Value Date    [id number] 19 (L) 11/07/2023    [id number] 29 (L) 04/12/2023     Lab Results   Component Value Date    PHOS 3.6 01/24/2018    PHOS 3.5 01/23/2018    PHOS 3.2 01/22/2018       Assessment & Plan:  Will recheck PTH and vitamin-D level.    Orders:  -     Vitamin D; Future; Expected date: 03/21/2024  -     PTH, intact; Future; Expected date: 03/21/2024    3. Benign hypertension  Assessment & Plan:  Good blood pressure control.  Continue current regimen.    Orders:  -     Comprehensive Metabolic Panel; Future; Expected date: 03/21/2024  -     Lipid Panel; Future; Expected date: 03/21/2024  -     amLODIPine (NORVASC) 5 MG tablet; Take 1 tablet (5 mg total) by mouth once daily.  Dispense: 90 tablet; Refill: 2    4. Mixed dyslipidemia  Overview:  Lab Results   Component Value Date    CHOL 172 11/07/2023    CHOL 133 05/25/2023    CHOL 231 (H) 04/12/2023     Lab Results   Component Value Date    HDL 40 11/07/2023    HDL 36 (L) 05/25/2023    HDL 40 04/12/2023     Lab Results   Component Value Date    LDLCALC 114.0 11/07/2023    LDLCALC 69.4 05/25/2023    LDLCALC 154.0 04/12/2023     Lab Results   Component Value Date    TRIG 90 11/07/2023    TRIG 138 05/25/2023    TRIG 185 (H) 04/12/2023     Lab Results   Component Value Date     "CHOLHDL 23.3 11/07/2023    CHOLHDL 27.1 05/25/2023    CHOLHDL 17.3 (L) 04/12/2023        The 10-year ASCVD risk score (Vishnu GRUBBS, et al., 2019) is: 3.1%    Values used to calculate the score:      Age: 53 years      Sex: Female      Is Non- : Yes      Diabetic: No      Tobacco smoker: No      Systolic Blood Pressure: 110 mmHg      Is BP treated: Yes      HDL Cholesterol: 40 mg/dL      Total Cholesterol: 172 mg/dL      Assessment & Plan:  Will recheck lipid panel.  Continue atorvastatin 20 milligrams daily for now.    Orders:  -     Comprehensive Metabolic Panel; Future; Expected date: 03/21/2024  -     Lipid Panel; Future; Expected date: 03/21/2024  -     atorvastatin (LIPITOR) 20 MG tablet; Take 1 tablet (20 mg total) by mouth once daily.  Dispense: 90 tablet; Refill: 3    5. Overweight  Assessment & Plan:  Wt Readings from Last 3 Encounters:   03/21/24 0958 85.6 kg (188 lb 11.4 oz)   12/01/23 1541 87.1 kg (192 lb 0.3 oz)   11/15/23 0811 86.4 kg (190 lb 7.6 oz)        Estimated body mass index is 29.56 kg/m² as calculated from the following:    Height as of this encounter: 5' 7" (1.702 m).    Weight as of this encounter: 85.6 kg (188 lb 11.4 oz).  Ideal body weight: 61.6 kg (135 lb 12.9 oz)     The patient's BMI has been recorded in the chart. The patient has been provided educational materials regarding the benefits of attaining and maintaining a normal weight. We will continue to address and follow this issue during follow up visits.  Referral to bariatric medicine provider placed    Orders:  -     Ambulatory referral/consult to Bariatric/Obesity Medicine; Future; Expected date: 03/31/2024              -Karson Jackson Jr., MD, AAHIVS      This note was generated with the assistance of ambient listening technology. Verbal consent was obtained by the patient and accompanying visitor(s) for the recording of patient appointment to facilitate this note. I attest to having reviewed and edited " the generated note for accuracy, though some syntax or spelling errors may persist. Please contact the author of this note for any clarification.          Patient Instructions   I will send you a message with the results. I messaged Dr. Montero's staff about calling you for an appt for weight management    Follow Up  No follow-ups on file.    Future Appointments   Date Time Provider Department Center   3/25/2024  9:15 AM LAB, [facility] DRAW STATION [facility]   4/2/2024 10:00 AM Baylee Ellison MD [facility]

## 2024-03-21 NOTE — PATIENT INSTRUCTIONS
I will send you a message with the results. I messaged Dr. Montero's staff about calling you for an appt for weight management

## 2024-03-22 ENCOUNTER — PATIENT MESSAGE (OUTPATIENT)
Dept: ADMINISTRATIVE | Facility: HOSPITAL | Age: 54
End: 2024-03-22
Payer: COMMERCIAL

## 2024-03-22 ENCOUNTER — PATIENT OUTREACH (OUTPATIENT)
Dept: ADMINISTRATIVE | Facility: HOSPITAL | Age: 54
End: 2024-03-22
Payer: COMMERCIAL

## 2024-03-24 PROBLEM — E66.3 OVERWEIGHT: Chronic | Status: ACTIVE | Noted: 2021-09-01

## 2024-03-24 PROBLEM — E21.3 HYPERPARATHYROIDISM: Chronic | Status: ACTIVE | Noted: 2024-03-24

## 2024-03-25 ENCOUNTER — LAB VISIT (OUTPATIENT)
Dept: LAB | Facility: HOSPITAL | Age: 54
End: 2024-03-25
Attending: FAMILY MEDICINE
Payer: COMMERCIAL

## 2024-03-25 DIAGNOSIS — Z00.00 ROUTINE MEDICAL EXAM: ICD-10-CM

## 2024-03-25 DIAGNOSIS — E78.2 MIXED DYSLIPIDEMIA: Chronic | ICD-10-CM

## 2024-03-25 DIAGNOSIS — E21.3 HYPERPARATHYROIDISM: Chronic | ICD-10-CM

## 2024-03-25 DIAGNOSIS — I10 BENIGN HYPERTENSION: Chronic | ICD-10-CM

## 2024-03-25 LAB
25(OH)D3+25(OH)D2 SERPL-MCNC: 16 NG/ML (ref 30–96)
ALBUMIN SERPL BCP-MCNC: 3.8 G/DL (ref 3.5–5.2)
ALP SERPL-CCNC: 71 U/L (ref 55–135)
ALT SERPL W/O P-5'-P-CCNC: 7 U/L (ref 10–44)
ANION GAP SERPL CALC-SCNC: 6 MMOL/L (ref 8–16)
AST SERPL-CCNC: 17 U/L (ref 10–40)
BASOPHILS # BLD AUTO: 0.04 K/UL (ref 0–0.2)
BASOPHILS NFR BLD: 1 % (ref 0–1.9)
BILIRUB SERPL-MCNC: 0.5 MG/DL (ref 0.1–1)
BUN SERPL-MCNC: 11 MG/DL (ref 6–20)
CALCIUM SERPL-MCNC: 9.6 MG/DL (ref 8.7–10.5)
CHLORIDE SERPL-SCNC: 114 MMOL/L (ref 95–110)
CHOLEST SERPL-MCNC: 147 MG/DL (ref 120–199)
CHOLEST/HDLC SERPL: 3.3 {RATIO} (ref 2–5)
CO2 SERPL-SCNC: 23 MMOL/L (ref 23–29)
CREAT SERPL-MCNC: 1 MG/DL (ref 0.5–1.4)
DIFFERENTIAL METHOD BLD: ABNORMAL
EOSINOPHIL # BLD AUTO: 0.1 K/UL (ref 0–0.5)
EOSINOPHIL NFR BLD: 1.2 % (ref 0–8)
ERYTHROCYTE [DISTWIDTH] IN BLOOD BY AUTOMATED COUNT: 14.6 % (ref 11.5–14.5)
EST. GFR  (NO RACE VARIABLE): >60 ML/MIN/1.73 M^2
GLUCOSE SERPL-MCNC: 87 MG/DL (ref 70–110)
HCT VFR BLD AUTO: 42.7 % (ref 37–48.5)
HDLC SERPL-MCNC: 44 MG/DL (ref 40–75)
HDLC SERPL: 29.9 % (ref 20–50)
HGB BLD-MCNC: 13.2 G/DL (ref 12–16)
IMM GRANULOCYTES # BLD AUTO: 0 K/UL (ref 0–0.04)
IMM GRANULOCYTES NFR BLD AUTO: 0 % (ref 0–0.5)
LDLC SERPL CALC-MCNC: 84 MG/DL (ref 63–159)
LYMPHOCYTES # BLD AUTO: 1.6 K/UL (ref 1–4.8)
LYMPHOCYTES NFR BLD: 40.3 % (ref 18–48)
MCH RBC QN AUTO: 26.8 PG (ref 27–31)
MCHC RBC AUTO-ENTMCNC: 30.9 G/DL (ref 32–36)
MCV RBC AUTO: 87 FL (ref 82–98)
MONOCYTES # BLD AUTO: 0.2 K/UL (ref 0.3–1)
MONOCYTES NFR BLD: 5.7 % (ref 4–15)
NEUTROPHILS # BLD AUTO: 2.1 K/UL (ref 1.8–7.7)
NEUTROPHILS NFR BLD: 51.8 % (ref 38–73)
NONHDLC SERPL-MCNC: 103 MG/DL
NRBC BLD-RTO: 0 /100 WBC
PLATELET # BLD AUTO: 190 K/UL (ref 150–450)
PMV BLD AUTO: 11.3 FL (ref 9.2–12.9)
POTASSIUM SERPL-SCNC: 4 MMOL/L (ref 3.5–5.1)
PROT SERPL-MCNC: 6.5 G/DL (ref 6–8.4)
PTH-INTACT SERPL-MCNC: 80.8 PG/ML (ref 9–77)
RBC # BLD AUTO: 4.92 M/UL (ref 4–5.4)
SODIUM SERPL-SCNC: 143 MMOL/L (ref 136–145)
TRIGL SERPL-MCNC: 95 MG/DL (ref 30–150)
WBC # BLD AUTO: 4.07 K/UL (ref 3.9–12.7)

## 2024-03-25 PROCEDURE — 85025 COMPLETE CBC W/AUTO DIFF WBC: CPT | Performed by: FAMILY MEDICINE

## 2024-03-25 PROCEDURE — 82306 VITAMIN D 25 HYDROXY: CPT | Performed by: FAMILY MEDICINE

## 2024-03-25 PROCEDURE — 80053 COMPREHEN METABOLIC PANEL: CPT | Performed by: FAMILY MEDICINE

## 2024-03-25 PROCEDURE — 83970 ASSAY OF PARATHORMONE: CPT | Performed by: FAMILY MEDICINE

## 2024-03-25 PROCEDURE — 80061 LIPID PANEL: CPT | Performed by: FAMILY MEDICINE

## 2024-03-25 PROCEDURE — 36415 COLL VENOUS BLD VENIPUNCTURE: CPT | Mod: PN | Performed by: FAMILY MEDICINE

## 2024-03-25 NOTE — ASSESSMENT & PLAN NOTE
"Wt Readings from Last 3 Encounters:   03/21/24 0958 85.6 kg (188 lb 11.4 oz)   12/01/23 1541 87.1 kg (192 lb 0.3 oz)   11/15/23 0811 86.4 kg (190 lb 7.6 oz)        Estimated body mass index is 29.56 kg/m² as calculated from the following:    Height as of this encounter: 5' 7" (1.702 m).    Weight as of this encounter: 85.6 kg (188 lb 11.4 oz).  Ideal body weight: 61.6 kg (135 lb 12.9 oz)     The patient's BMI has been recorded in the chart. The patient has been provided educational materials regarding the benefits of attaining and maintaining a normal weight. We will continue to address and follow this issue during follow up visits.  Referral to bariatric medicine provider placed  "

## 2024-03-27 ENCOUNTER — TELEPHONE (OUTPATIENT)
Dept: BARIATRICS | Facility: CLINIC | Age: 54
End: 2024-03-27
Payer: COMMERCIAL

## 2024-04-02 ENCOUNTER — OFFICE VISIT (OUTPATIENT)
Dept: NEUROLOGY | Facility: CLINIC | Age: 54
End: 2024-04-02
Payer: COMMERCIAL

## 2024-04-02 DIAGNOSIS — G47.00 INSOMNIA, UNSPECIFIED TYPE: ICD-10-CM

## 2024-04-02 DIAGNOSIS — G44.40 MEDICATION OVERUSE HEADACHE: ICD-10-CM

## 2024-04-02 DIAGNOSIS — G43.709 CHRONIC MIGRAINE WITHOUT AURA WITHOUT STATUS MIGRAINOSUS, NOT INTRACTABLE: Primary | ICD-10-CM

## 2024-04-02 PROCEDURE — 99214 OFFICE O/P EST MOD 30 MIN: CPT | Mod: 95,,, | Performed by: STUDENT IN AN ORGANIZED HEALTH CARE EDUCATION/TRAINING PROGRAM

## 2024-04-02 RX ORDER — UBROGEPANT 100 MG/1
100 TABLET ORAL
Qty: 16 TABLET | Refills: 3 | Status: SHIPPED | OUTPATIENT
Start: 2024-04-02

## 2024-04-02 RX ORDER — TOPIRAMATE 100 MG/1
100 TABLET, FILM COATED ORAL 2 TIMES DAILY
Qty: 60 TABLET | Refills: 3 | Status: SHIPPED | OUTPATIENT
Start: 2024-04-02 | End: 2024-07-31

## 2024-04-02 NOTE — PROGRESS NOTES
The patient location is: [place]  The chief complaint leading to consultation is: Headache    Visit type: audiovisual    Each patient to whom he or she provides medical services by telemedicine is:  (1) informed of the relationship between the physician and patient and the respective role of any other health care provider with respect to management of the patient; and (2) notified that he or she may decline to receive medical services by telemedicine and may withdraw from such care at any time.    Chief Complaint and Duration     Headache started in December 2022, follow up migraines    History of Present Illness     Alondra Carrera is a 53 y.o. female with a history of multiple medical diagnoses as listed below that presents for evaluation of headaches that started in December 2022.    Has hx of migraines in which she saw Dr Melgar at [facility] for prior - sensitive to noise, light. Last saw him prior to beginning of the pandemic.    States she had a mechanical fall back in December, hit her head. Did not LOC. Head CT was done which showed no bleed or acute intracranial process. Did show bilatearl basal ganglia calcifications.    Denies migraines at this time, intermittent headache that may be positional related / tension / poor sleep / related to food intake. Has them intermittently. Was given prescription fioricet prior in ER.    Otherwise no other illnesses.  Takes aspirin, statin.     Interim period:  5/16/23 states continues to have headaches, migraines up to 4x/month (1x/week). Prior neurologist had patient on preventative of topiramate 50mg in AM, 100mg in PM.  Triggers include gym, infrared sauna.  Fam hx of strokes.     4/2/24 - medication refill.   On topiramate 100mg BID, did not get ubrelvy.  Stress, tension.  New job a year ago, works from home.   Takes BC powder and exdrin almost daily.    Review of patient's allergies indicates:   Allergen Reactions    Sulfa (sulfonamide antibiotics) Swelling      Swelling of Eyes and Lips     Current Outpatient Medications   Medication Sig Dispense Refill    amLODIPine (NORVASC) 5 MG tablet Take 1 tablet (5 mg total) by mouth once daily. 90 tablet 2    aspirin (ECOTRIN) 325 MG EC tablet Take 325 mg by mouth.      atogepant 60 mg Tab Take 1 tablet (60 mg total) by mouth once daily. 30 tablet 3    atorvastatin (LIPITOR) 20 MG tablet Take 1 tablet (20 mg total) by mouth once daily. 90 tablet 3    ibuprofen (ADVIL,MOTRIN) 600 MG tablet PLEASE SEE ATTACHED FOR DETAILED DIRECTIONS      ketoconazole (NIZORAL) 2 % cream Apply topically once daily. 60 g 0    metoprolol tartrate (LOPRESSOR) 50 MG tablet TAKE 1 TABLET BY MOUTH TWICE A DAY 60 tablet 1    nystatin (MYCOSTATIN) powder Apply topically 2 (two) times daily. 60 g 0    omeprazole (PRILOSEC) 20 MG capsule Take 1 capsule (20 mg total) by mouth once daily. 90 capsule 0    topiramate (TOPAMAX) 100 MG tablet Take 1 tablet (100 mg total) by mouth 2 (two) times daily. 60 tablet 3    ubrogepant (UBRELVY) 100 mg tablet Take 1 tablet (100 mg total) by mouth as needed for Migraine. Okay to take 2nd dose in 2 hrs if mild relief, no more than 2 in 24 hrs. 16 tablet 3     No current facility-administered medications for this visit.       Medical History     Past Medical History:   Diagnosis Date    Genital herpes, unspecified     Hypertension     Mental disorder     Migraines      Past Surgical History:   Procedure Laterality Date    CARDIAC VALVE REPLACEMENT  2/2018    Repair    ECTOPIC PREGNANCY SURGERY  2008    EXPLORATORY LAPAROTOMY W/ BOWEL RESECTION  2008    during ectopic pregnancy, bowel was perforated and needed partial resection    MITRAL VALVE REPAIR  2018    NASAL TURBINATE REDUCTION  2007    TUBAL LIGATION       Family History   Problem Relation Age of Onset    Hearing loss Mother     Hypertension Mother     Stroke Mother     Hearing loss Father     Breast cancer Neg Hx     Colon cancer Neg Hx     Ovarian cancer Neg Hx     COPD  Neg Hx     Drug abuse Neg Hx     Hyperlipidemia Neg Hx     Kidney disease Neg Hx      Social History     Socioeconomic History    Marital status:    Tobacco Use    Smoking status: Former     Current packs/day: 0.00     Average packs/day: 0.1 packs/day for 39.1 years (2.5 ttl pk-yrs)     Types: Cigarettes     Start date:      Quit date: 2/15/2018     Years since quittin.1   Substance and Sexual Activity    Alcohol use: Not Currently    Drug use: No    Sexual activity: Not Currently     Partners: Male     Birth control/protection: Post-menopausal     Social Determinants of Health     Financial Resource Strain: Low Risk  (3/18/2024)    Overall Financial Resource Strain (CARDIA)     Difficulty of Paying Living Expenses: Not hard at all   Food Insecurity: No Food Insecurity (3/18/2024)    Hunger Vital Sign     Worried About Running Out of Food in the Last Year: Never true     Ran Out of Food in the Last Year: Never true   Transportation Needs: No Transportation Needs (3/18/2024)    PRAPARE - Transportation     Lack of Transportation (Medical): No     Lack of Transportation (Non-Medical): No   Physical Activity: Unknown (3/18/2024)    Exercise Vital Sign     Days of Exercise per Week: Patient declined   Stress: No Stress Concern Present (3/18/2024)    [place] [employer] - Occupational Stress Questionnaire     Feeling of Stress : Only a little   Social Connections: Unknown (3/18/2024)    Social Connection and Isolation Panel [NHANES]     Frequency of Communication with Friends and Family: Twice a week     Frequency of Social Gatherings with Friends and Family: Twice a week     Active Member of Clubs or Organizations: Yes     Attends Club or Organization Meetings: More than 4 times per year     Marital Status:    Housing Stability: High Risk (3/18/2024)    Housing Stability Vital Sign     Unable to Pay for Housing in the Last Year: No     Unstable Housing in the Last Year: Yes        Exam     There were no vitals filed for this visit.     Virtual visit    Physical Exam:  General: She is not in acute distress. She is not ill-appearing.   HENT: Normocephalic and atraumatic. Psychiatric: Mood normal.        Neurologic Exam   Mental status: oriented to person, place, and time  Attention: Normal. Concentration: normal.  Speech: speech is normal.  Cranial Nerves: EOMI, face symmetric  Moving extremities symmetrically    Labs and Imaging     Head CT reviewed - no acute intracranial processes. Does show bilateral basal ganglia calcifications    Assessment and Plan     Problem List Items Addressed This Visit          Neuro    Migraine - Primary (Chronic)    Relevant Medications    ubrogepant (UBRELVY) 100 mg tablet    atogepant 60 mg Tab    topiramate (TOPAMAX) 100 MG tablet    Medication overuse headache       Other    Insomnia     53 year old female, hx of chronic migraines before. Has intermittent headaches after she had fall back in December 2022 - can take NSAIDs PRN. Can also try daily magnesium. Nonfocal exam today, head CT did show bilateral basal ganglia calcifications which can be idiopathic.     4x/month. Fam hx of strokes.  Preventative - topamax 100mg BID, magnesium 400mg daily.  Abortive - ubrelvy PRN    Noticed new trigger of sugars, patient did have history of ulcer in the past.  Patient has significant tension stress headaches, is taking almost daily Excedrin migraine BC powder.  We will add on to patient's preventative.  We will add on Quilepta on top of Topamax.  Discussed medication overuse with Excedrin migraines and BC powder.    Follow-up: 3-4 months, okay for virtual

## 2024-04-03 ENCOUNTER — TELEPHONE (OUTPATIENT)
Dept: NEUROLOGY | Facility: CLINIC | Age: 54
End: 2024-04-03
Payer: COMMERCIAL

## 2024-04-03 NOTE — TELEPHONE ENCOUNTER
I spoke with the patient, scheduled 4 month f/u, per. provider, 08/09/2024 for 8:30 am.   ----- Message from Baylee Ellison MD sent at 4/2/2024  2:06 PM CDT -----  Regarding: contact patient to schedule  Schedule 3-4 months, okay if she wants to do virtual for followup of her headaches

## 2024-04-05 ENCOUNTER — TELEPHONE (OUTPATIENT)
Dept: BARIATRICS | Facility: CLINIC | Age: 54
End: 2024-04-05
Payer: COMMERCIAL

## 2024-04-05 NOTE — TELEPHONE ENCOUNTER
----- Message from Katy Muller sent at 4/5/2024  2:57 PM CDT -----  Contact: [phone]  PATIENTCALL     Pt is calling to speak with someone in provider office regarding she had her financial consult and she was ready to schedule her next appt. She  is asking for a return call please call.

## 2024-04-05 NOTE — TELEPHONE ENCOUNTER
Due to confusion with what was written in her Guarantor notes, verified with Ava Claire with Finance who stated that there was a typo and the pt DOES have MWL benefits. Pt is aware of limited medication cost coverage.    Verified with Dr. Wyatt who stated pt has qualifying co-morbidities for MWL and can proceed with scheduling consult.     Called and LVM for [facility] to discuss MWL consultation.  Provided Bariatric clinic callback number if pt would like to reschedule.

## 2024-04-08 ENCOUNTER — PATIENT MESSAGE (OUTPATIENT)
Dept: ADMINISTRATIVE | Facility: OTHER | Age: 54
End: 2024-04-08
Payer: COMMERCIAL

## 2024-04-24 NOTE — PROGRESS NOTES
Subjective     Patient ID: Alondra Carrera is a 53 y.o. female.    Chief Complaint: Consult    CC:    New pt to me, referred by Self, Aaareferral  No address on file , with Patient Active Problem List:     Preop examination     Hyperglycemia     S/P MVR (mitral valve repair)     Non-rheumatic mitral regurgitation     Perennial non-allergic rhinitis     Overweight     Benign hypertension     VLADIMIR (generalized anxiety disorder)     GERD without esophagitis     History of mitral valve disease     Hypertensive heart disease     Insomnia     LVH (left ventricular hypertrophy)     Migraine     Mixed dyslipidemia     Sprain and strain of right ankle     Foot pain, bilateral     Vitamin D deficiency     Localized osteoporosis without current pathological fracture     Abnormal head CT     Medication overuse headache     Candida infection of flexural skin     Hyperparathyroidism      Lab Results       Component                Value               Date                       HGBA1C                   5.2                 04/12/2023                 HGBA1C                   4.9                 01/16/2018            Lab Results       Component                Value               Date                       LDLCALC                  84.0                03/25/2024                 CREATININE               1.0                 03/25/2024                  Current attempts at weight loss:  States she feels she is eating smaller portions. No exercise currently.     Previous diet attempts: hotworx- BP went up. WW- did not like counting.     History of medication for loss: phentermine from different MDs in 2022. Last filled 12/2022. On topiramate for migraines. States she was on compounded semaglutide last year until oct.     Heaviest weight: 215#    Lightest weight: 120#    Goal weight: 160#      Last eye exam:     2022. No glaucoma     Provider:    Typical eating patterns: Works for [employer]. Lives with  and 2 adults kids. THey  "share in cooking.   Breakfast:  fruit. Weekends: same.     lunch: sandwich, cane's. Weekends: fast food- burger. Pasta,     dinner: beans. Pasta and veg.      snacks: chips, pretzels, cookies.     Beverages: Coffee- creamer and AS. Water, tea- sweet, lemonade. ETOH    Willingness to change:  7/10    Cardiac studies: Normal sinus rhythm   Left atrial enlargement   ST and/or T wave abnormalities suggesting myocardial ischemia   Abnormal ECG   When compared with ECG of 23-JAN-2018 12:34,   Nonspecific T wave abnormality has replaced inverted T waves in Inferior   leads   T wave inversion no longer evident in Anterior leads   T wave inversion more evident in Lateral leads         CONCLUSIONS     1 - Concentric remodeling.     2 - Mild left atrial enlargement.     3 - Mildly enlarged ascending aorta.     4 - No wall motion abnormalities.     5 - Normal left ventricular systolic function (EF 65-70%).     6 - Indeterminate LV diastolic function.     7 - Normal right ventricular systolic function .     8 - The estimated PA systolic pressure is greater than 25 mmHg.     9 - Mild tricuspid regurgitation.     10 - S/p mitral valve repair with 29mm Medtronic Simulus band.         BMR: 1439    PBF:  43.2%    Review of Systems       Objective   /78   Pulse (!) 50   Ht 5' 6" (1.676 m)   Wt 87.1 kg (192 lb 1.6 oz)   SpO2 98%   BMI 31.01 kg/m²     Physical Exam  Vitals reviewed.   Constitutional:       General: She is not in acute distress.     Appearance: She is well-developed. She is obese.   HENT:      Head: Normocephalic and atraumatic.   Eyes:      General: No scleral icterus.     Pupils: Pupils are equal, round, and reactive to light.   Neck:      Thyroid: No thyromegaly.   Cardiovascular:      Rate and Rhythm: Normal rate.      Heart sounds: Normal heart sounds. No murmur heard.     No friction rub. No gallop.   Pulmonary:      Effort: Pulmonary effort is normal. No respiratory distress.      Breath sounds: Normal " breath sounds. No wheezing.   Abdominal:      General: Bowel sounds are normal. There is no distension.      Palpations: Abdomen is soft.      Tenderness: There is no abdominal tenderness.   Musculoskeletal:         General: Normal range of motion.      Cervical back: Normal range of motion and neck supple.   Skin:     General: Skin is warm and dry.      Findings: No erythema.   Neurological:      Mental Status: She is alert and oriented to person, place, and time.      Cranial Nerves: No cranial nerve deficit.   Psychiatric:         Behavior: Behavior normal.         Judgment: Judgment normal.            Assessment and Plan     1. Obesity, Class I, BMI 30.0-34.9 (see actual BMI)  -     Discontinue: semaglutide, weight loss, (WEGOVY) 0.25 mg/0.5 mL PnIj; Inject 0.25 mg into the skin every 7 days.  Dispense: 2 mL; Refill: 0  -     Discontinue: semaglutide, weight loss, (WEGOVY) 0.5 mg/0.5 mL PnIj; Inject 0.5 mg into the skin every 7 days.  Dispense: 2 mL; Refill: 0  -     Discontinue: semaglutide, weight loss, (WEGOVY) 1 mg/0.5 mL PnIj; Inject 1 mg into the skin every 7 days.  Dispense: 2 mL; Refill: 0  -     semaglutide, weight loss, (WEGOVY) 0.25 mg/0.5 mL PnIj; Inject 0.25 mg into the skin every 7 days.  Dispense: 2 mL; Refill: 0  -     semaglutide, weight loss, (WEGOVY) 0.5 mg/0.5 mL PnIj; Inject 0.5 mg into the skin every 7 days.  Dispense: 2 mL; Refill: 0  -     semaglutide, weight loss, (WEGOVY) 1 mg/0.5 mL PnIj; Inject 1 mg into the skin every 7 days.  Dispense: 2 mL; Refill: 0    2. Benign hypertension    3. GERD without esophagitis    4. LVH (left ventricular hypertrophy)        1. Obesity, Class I, BMI 30.0-34.9 (see actual BMI)    - semaglutide, weight loss, (WEGOVY) 0.25 mg/0.5 mL PnIj; Inject 0.25 mg into the skin every 7 days.  Dispense: 2 mL; Refill: 0  - semaglutide, weight loss, (WEGOVY) 0.5 mg/0.5 mL PnIj; Inject 0.5 mg into the skin every 7 days.  Dispense: 2 mL; Refill: 0  - semaglutide, weight  loss, (WEGOVY) 1 mg/0.5 mL PnIj; Inject 1 mg into the skin every 7 days.  Dispense: 2 mL; Refill: 0    Start Wegovy 0.25 mg once a week x 1 month. At the end of that month, the dose will continue to increase monthly.       Decrease portions as soon as you start wegovy. Avoid fried, rich or greasy foods.      Some nausea in the first 2 weeks is not unusual.     If you get pain across the upper abdomen and around to your back, please call the office.       [email or web address] to sign up for coupon card.         Increase low impact activity as tolerated.  Avoid high impact activity, very heavy lifting or other exercise regimens that may cause discomfort.       Add some type of resistance training 2-3 days a week. These can be body weight exercises, light weight or elastic bands. [employer]com and [employer] are great sources for free work out plans and videos.       No soda, sweet tea, juices or lemonade. All drinks should be 5 calories or less.       1000 diya pb meal palnner, meal ideasa and exercise handout given.   2. Benign hypertension  The current medical regimen is effective;  continue present plan and medications. Expect improvement with weight loss.     3. GERD without esophagitis  Expect improvement with weight loss  Attempt to wean PPI once 10% TBW lost.      4. LVH (left ventricular hypertrophy)  Avoid stimulant anorectics             No follow-ups on file.

## 2024-04-25 ENCOUNTER — TELEPHONE (OUTPATIENT)
Dept: BARIATRICS | Facility: CLINIC | Age: 54
End: 2024-04-25
Payer: COMMERCIAL

## 2024-04-25 ENCOUNTER — OFFICE VISIT (OUTPATIENT)
Dept: BARIATRICS | Facility: CLINIC | Age: 54
End: 2024-04-25
Payer: COMMERCIAL

## 2024-04-25 VITALS
HEIGHT: 66 IN | BODY MASS INDEX: 30.88 KG/M2 | HEART RATE: 50 BPM | OXYGEN SATURATION: 98 % | DIASTOLIC BLOOD PRESSURE: 78 MMHG | WEIGHT: 192.13 LBS | SYSTOLIC BLOOD PRESSURE: 119 MMHG

## 2024-04-25 DIAGNOSIS — I10 BENIGN HYPERTENSION: Chronic | ICD-10-CM

## 2024-04-25 DIAGNOSIS — I51.7 LVH (LEFT VENTRICULAR HYPERTROPHY): ICD-10-CM

## 2024-04-25 DIAGNOSIS — E66.9 OBESITY, CLASS I, BMI 30.0-34.9 (SEE ACTUAL BMI): Primary | ICD-10-CM

## 2024-04-25 DIAGNOSIS — K21.9 GERD WITHOUT ESOPHAGITIS: Chronic | ICD-10-CM

## 2024-04-25 PROCEDURE — 3074F SYST BP LT 130 MM HG: CPT | Mod: CPTII,S$GLB,, | Performed by: INTERNAL MEDICINE

## 2024-04-25 PROCEDURE — 3008F BODY MASS INDEX DOCD: CPT | Mod: CPTII,S$GLB,, | Performed by: INTERNAL MEDICINE

## 2024-04-25 PROCEDURE — 3078F DIAST BP <80 MM HG: CPT | Mod: CPTII,S$GLB,, | Performed by: INTERNAL MEDICINE

## 2024-04-25 PROCEDURE — 99999 PR PBB SHADOW E&M-EST. PATIENT-LVL IV: CPT | Mod: PBBFAC,,, | Performed by: INTERNAL MEDICINE

## 2024-04-25 PROCEDURE — 1159F MED LIST DOCD IN RCRD: CPT | Mod: CPTII,S$GLB,, | Performed by: INTERNAL MEDICINE

## 2024-04-25 PROCEDURE — 1160F RVW MEDS BY RX/DR IN RCRD: CPT | Mod: CPTII,S$GLB,, | Performed by: INTERNAL MEDICINE

## 2024-04-25 PROCEDURE — 99215 OFFICE O/P EST HI 40 MIN: CPT | Mod: S$GLB,,, | Performed by: INTERNAL MEDICINE

## 2024-04-25 RX ORDER — SEMAGLUTIDE 0.5 MG/.5ML
0.5 INJECTION, SOLUTION SUBCUTANEOUS
Qty: 2 ML | Refills: 0 | Status: SHIPPED | OUTPATIENT
Start: 2024-05-25 | End: 2024-04-25 | Stop reason: SDUPTHER

## 2024-04-25 RX ORDER — SEMAGLUTIDE 1 MG/.5ML
1 INJECTION, SOLUTION SUBCUTANEOUS
Qty: 2 ML | Refills: 0 | Status: SHIPPED | OUTPATIENT
Start: 2024-06-25 | End: 2024-04-25 | Stop reason: SDUPTHER

## 2024-04-25 RX ORDER — SEMAGLUTIDE 0.25 MG/.5ML
0.25 INJECTION, SOLUTION SUBCUTANEOUS
Qty: 2 ML | Refills: 0 | Status: SHIPPED | OUTPATIENT
Start: 2024-04-25 | End: 2024-04-25 | Stop reason: SDUPTHER

## 2024-04-25 RX ORDER — SEMAGLUTIDE 0.5 MG/.5ML
0.5 INJECTION, SOLUTION SUBCUTANEOUS
Qty: 2 ML | Refills: 0 | Status: SHIPPED | OUTPATIENT
Start: 2024-05-25

## 2024-04-25 RX ORDER — OMEPRAZOLE 40 MG/1
40 CAPSULE, DELAYED RELEASE ORAL EVERY MORNING
COMMUNITY
Start: 2024-03-26

## 2024-04-25 RX ORDER — SEMAGLUTIDE 1 MG/.5ML
1 INJECTION, SOLUTION SUBCUTANEOUS
Qty: 2 ML | Refills: 0 | Status: SHIPPED | OUTPATIENT
Start: 2024-06-25

## 2024-04-25 RX ORDER — SEMAGLUTIDE 0.25 MG/.5ML
0.25 INJECTION, SOLUTION SUBCUTANEOUS
Qty: 2 ML | Refills: 0 | Status: SHIPPED | OUTPATIENT
Start: 2024-04-25

## 2024-04-25 NOTE — TELEPHONE ENCOUNTER
----- Message from Radha Buckley sent at 4/25/2024  3:28 PM CDT -----  Regarding: running late  Contact: [phone]  Caller:Alondra    Provider:Leslie    Calling to ask will I still be seen? Please call    ETA:3:45    APPT time:3:30

## 2024-04-25 NOTE — PATIENT INSTRUCTIONS
"PLEASE ARRIVE 15-20 min EARLY FOR YOUR APPOINTMENTS. This allows us to perform all of the services for your appointment in a timely fashion. Arriving late for your appointment not only decreases the time available for the doctor to see you, it also leads to delays for every other patient scheduled after you. Patients arriving after their appointment time may be asked to wait until all patients that arrived on time are seen, and/or there is another available slot for you to be worked in. When this is not possible, you may be asked to reschedule.   Thank you for your consideration in this matter.       Start Wegovy 0.25 mg once a week x 1 month. At the end of that month, the dose will continue to increase monthly.       Decrease portions as soon as you start wegovy. Avoid fried, rich or greasy foods.      Some nausea in the first 2 weeks is not unusual.     If you get pain across the upper abdomen and around to your back, please call the office.       [email or web address] to sign up for coupon card.         Increase low impact activity as tolerated.  Avoid high impact activity, very heavy lifting or other exercise regimens that may cause discomfort.       Add some type of resistance training 2-3 days a week. These can be body weight exercises, light weight or elastic bands. [employer] and [employer] are great sources for free work out plans and videos.       No soda, sweet tea, juices or lemonade. All drinks should be 5 calories or less.                     1000 calorie  Meal Plan  STARCHES 80 CALORIES PER SERVING 15g CARB, 3g PROTEIN, 1g FAT  Servings per day   bread   tortilla   crackers   cooked cereals   dry cereals   pasta   rice   corn   popcorn   potato (small)   potato, mashed   sweet potato   squash, winter   cooked beans, peas, lentils (add 1 meat exchange)   1 slice   1 (6")   4-6 (3/4 oz)   1/2 cup   3/4 cup   1/2 cup   1/3 cup  1/2 cup   1 small light bag  1 (3 oz)   1/2 cup   1/3 cup   1 cup   1/2 cup Most " starches are a good source of B vitamins   Choose whole grain foods such as 100% whole wheat bread and flour, brown rice, tortillas, etc. for nutrients and fiber.   Combine beans (starch & meat) with grains (starch) for their complimentary proteins and fiber   Combine grains (starch) with milk (milk) or cheese (meat) to compliment proteins     2   FRUIT 60 CALORIES PER SERVING 15g CARB    fresh fruit   banana  melon (cubes)   berries  canned fruit   dried fruit    1 small   1/2  ½ cup   ¾ cup  ½ cup   ¼ cup    Choose whole fruits for fiber   No fruit juices   2   DAIRY  CALORIES PER SERVING 12g CARB, 8g PROTEIN, 0-8g FAT    milk   yogurt  Protein soy or almond milk 1 cup   1 cup   1 cup Use unsweetened almond or soy milk with added protein  Avoid chocolate or flavored milk  Avoid yogurt with more than 8 gms of sugar. Gms of protein should be higher than grams of sugar               1   MEAT AND SUBSTITUES  CALORIES PER SERVING 7g Protein, 0-13g FAT    Meat,Seafood and fish   cheese   cottage cheese   egg   peanut butter   tofu or tempeh  cooked beans, peas, lentils (add 1 starch)  Quinoa (add 1 starch)   Nuts and seeds (½ serving protein + 1 fat)  Nutritional yeast  Morning Star grillers 1 oz       1 oz  1/4 cup     1   1.5 Tbsp   4 oz (1/2 cup)   1/2 cup              ¼ cup            2 tablespoons    1 noemi or ½ cup      Choose fish, seafood and lower fat cheeses  Limit frying or adding fat.      8   FATS 45 CALORIES PER SERVING     oil   mayonnaise   cream cheese   salad dressing   peanuts   avocado   butter or margarine    1 tsp   1 tsp   1 Tbsp   1 Tbsp   10   1/8   1 tsp Eat less fat.   Eat less saturated fat such as animal fat found in fatter meat, cheese, and butter. Also eat less hydrogenated fat.      2-3   VEGETABLES 25 CALORIES PER SERVING 5 g CARB, 2g PROTEIN    raw vegetables   cooked vegetables   tomato or vegetable juice 1 cup   1/2 cup     1/2 cup Choose dark green leafy and deep yellow  vegetables such as spinach, zuchinni, squash, mushrooms, cauliflower ,broccoli, carrots, and peppers.   Unlimited       1000 CALORIE MEAL PLAN  Eat 3 small meals per day with 1-2 small snacks to keep you full throughout the day  Aim for at least 15 gms of protein at breakfast, at least 25 grams at lunch and at least 25 grams of protein at dinner.  Snacks should contain at least 5 grams of protein  Limit starches to 1 serving per meal or snack.  Keep your carbs whole grain or whole wheat  Limit your intake of refined sugar including sugary beverages ie sweet tea, lemonade, fruit punch             Fruit Protein Dairy Starch Fats Calories   Breakfast 1 2    370   Lunch  3  1 1 290   Dinner 1 3  1 1 315   Snack   1   120   Total 2 8 1 2 2 1085     Sample breakfasts:  2 scrambled eggs (use spray), 1 cup Protein Silk soy milk, 1 slice whole wheat toast, 1 small orange  Omelet made with 1 egg, 1-ounce low fat cheese and non-starchy veggies, 1 low fat plain yogurt with ½ banana  Plain yogurt with ¾ cup unsweetened cold cereal and ½ cup fresh fruit salad, 2 hardboiled eggs  Sample lunches:  ½ whole wheat bagel topped with 3 ounces of low fat cheese melted in oven with a wild green salad with 1 TBSP dressing  ¾ cup cooked beans with 6 whole grain crackers, 10 roasted peanuts  ½ medium baked potato with 3 ounces of low fat cheddar cheese and 1 TBSP  sour cream  1 small wheat tortilla brushed with 1 tsp olive oil then brushed with pizza sauce and 4 ounces low fat cheese, sliced mushrooms and green peppers broiled until cheese is melted.  ½ cup chopped melon    Sample Dinners:  4 ounces of tuna pan seared in 1 tsp olive oil, ½ baked small sweet potato and 2 small plums  ½ cup chick peas, 1 cup cauliflower sauteed with 1 tbsp chopped onion, 1 tsp oil, and 2 tsp hodges powder. Add low sodium vegetable stock to desired consistency. Serve with ½ cup brown rice  Red beans seasoned with onions and bell peppers served over cauliflower  rice  1 cup cooked green or brown lentils served with ½ cup cooked quinoa and chopped tomatoes and cucumbers and 1 tbsp feta cheese  Sample Snacks:  1 cup of Protein Silk Soy milk with 3 squares flaca crackers  3/4 ounce Triscuits with 1 ounce cubed cheese  Chobani Triple Zero yogurt with ¾ cup unsweetened cereal  ½ cup edamame (shelled)    Meal Ideas for Regular Bariatric Diet  *Recipes and products available at [email or web address].com      Breakfast: (15-20g protein)    - Egg white omelet: 2 egg whites or ½ cup Egg Beaters. (Optional proteins: cheese, shrimp, black beans, chicken, sliced turkey) (Optional veggies: tomatoes, salsa, spinach, mushrooms, onions, green peppers, or small slice avocado)     - Egg and sausage: 1 egg or ¼ cup Egg Beaters (any variety), with 1 noemi or 2 links of Turkey sausage or Veggie breakfast sausage (Fastclick or [employer])    - Crust-less breakfast quiche: To make a glass pie dish, mix 4oz part skim Ricotta, 1 cup skim milk, and 2 eggs as your base. Add protein: shredded cheese, sliced lean ham or turkey, turkey claros/sausage. Add veggies: tomato, onion, green onion, mushroom, green pepper, spinach, etc.    - Yogurt parfait: Mix 1 - 6oz container Dannon Light N Fit vanilla yogurt, with ¼ cup Kashi Go Lean cereal    - Cottage cheese and fruit: ½ cup part-skim cottage cheese or ricotta cheese topped with fresh fruit or sugar free preserves     - Kristine Martinez's Vanilla Egg custard* (add 2 Tbsp instant coffee granules to make Cappuccino Custard*)    - Hi-Protein café latte (skim milk, decaf coffee, 1 scoop protein powder). Optional to add Sugar free syrup or extract flavoring.    Lunch: (20-30g protein)    - ½ cup Black bean soup (Homemade or Progresso), with ¼ cup shredded low-fat cheese. Top with chopped tomato or fresh salsa.     - Lean deli turkey breast and low-fat sliced cheese, mustard or light raygoza to moisten, rolled up together, or wrapped in a Nabeel lettuce leaf    -  Chicken salad made from dinner leftovers, moisten with low-fat salad dressing or light raygoza. Also try leftover salmon, shrimp, tuna or boiled eggs. Serve ½ cup over dark green salad    - Fat-free canned refried beans, topped with ¼ cup shredded low-fat cheese. Top with chopped tomato or fresh salsa.     - Greek salad: Top mixed greens with 1-2oz grilled chicken, tomatoes, red onions, 2-3 kalamata olives, and sprinkle lightly with feta cheese. Spritz with Balsamic vinegar to taste.     - Crust-less lunch quiche: To make a glass pie dish, mix 4oz part skim Ricotta, 1 cup skim milk, and 2 eggs as your base. Add protein: shredded cheese, sliced lean ham or turkey, shrimp, chicken. Add veggies: tomato, onion, green onion, mushroom, green pepper, spinach, artichoke, broccoli, etc.    - Pizza bake: tomato sauce, low-fat shredded mozzarella and turkey pepperoni or Uruguayan claros. Add any veggies.    - Cucumber crab bites: Spread ¼ cup crab dip (lump crabmeat + light cream cheese and green onions) over sliced cucumber.     - Chicken with light spinach and artichoke dip*: Puree in : 6oz cooked and drained spinach, 2 cloves garlic, 1 can cannelloni beans, ½ cup chopped green onions, 1 can drained artichoke hearts (not marinated in oil), lemon juice and basil. Mix in 2oz chopped up chicken.    Supper: (20-30g protein)    - Serve grilled fish over dark green salad tossed with low-fat dressing, served with grilled asparagus castillo     - Rotisserie chicken salad: served with sliced strawberries, walnuts, fat-free feta cheese crumbles and 1 tbsp Agudelos Own Light Raspberry Wells Vinaigrette    - Shrimp cocktail: Dip cold boiled shrimp in homemade low-sugar cocktail sauce (1/2 cup Leandro One Carb ketchup, 2 tbsp horseradish, 1/4 tsp hot sauce, 1 tsp Worcestershire sauce, 1 tbsp freshly-squeezed lemon juice). Serve with dark green salad, walnuts, and crumbled blue cheese drizzled with olive oil and Balsamic  vinegar    - Tuna Melt: Spread tuna salad onto 2 thick slices of tomato. Top with low-fat cheese and broil until cheese is melted. May also be made with chicken salad of shrimp salad. [place] with different types of cheeses.    - Homemade low-fat Chili using extra lean ground beef or ground turkey. Top with shredded cheese and salsa as desired. May add dollop fat-free sour cream if desired    - Dinner Omelet with shrimp or chicken and onion, green peppers and chives.    - No noodle lasagna: Use sliced zucchini or eggplant in place of noodles.  Layer with part skim ricotta cheese and low sugar meat sauce (use very lean ground beef or ground turkey).    - Mexican chicken bake: Bake chunks of chicken breast or thigh with taco seasoning, Pace brand enchilada sauce, green onions and low-fat cheese. Serve with ¼ cup black beans or fat free refried beans topped with chopped tomatoes or salsa.    - Naman frozen meatballs, simmered in Classico Marinara sauce. Different flavors of salsa or spaghetti sauce create different dishes! Sprinkle with parmesan cheese. Serve with grilled or steamed veggies, or a dark green salad.    - Simmer boneless skinless chicken thigh chunks in Classico Marinara sauce or roasted salsa until tender with chopped onion, bell pepper, garlic, mushrooms, spinach, etc.     - Hamburger, without the bun, dressed the way you like. Served with grilled or steamed veggies.    - Eggplant parmesan: Bake slices of eggplant at 350 degrees for 15 minutes. Layer tomato sauce, sliced eggplant and low-fat mozzarella cheese in a baking dish and cover with foil. Bake 30-40 more minutes or until bubbly. Uncover and bake at 400 degrees for about 15 more minutes, or until top is slightly crisp.    - Fish tacos: grilled/baked white fish, wrapped in Nabeel lettuce leaf, topped with salsa, shredded low-fat cheese, and light coleslaw.    Snacks: (100-200 calories; >5g protein)    - 1 low-fat cheese stick with 8 cherry  tomatoes or 1 serving fresh fruit  - 4 thin slices fat-free turkey breast and 1 slice low-fat cheese  - 4 thin slices fat-free honey ham with wedge of melon  - 1/4 cup unsalted nuts with ½ cup fruit  - 6-oz container Dannon Light n Fit vanilla yogurt, topped with 1oz unsalted nuts         - apple, celery or baby carrots spread with 2 Tbsp natural peanut butter or almond butter   - apple slices with 1 oz slice low-fat cheese  - celery, cucumber, bell pepper or baby carrots dipped in ¼ cup hummus bean spread or light spinach and artichoke dip (*recipe in lunch section)  - 100 calorie bag microwave light popcorn with 3 tbsp grated parmesan cheese  - Javad Links Beef Steak - 14g protein! (similar to beef jerky)  - 2 wedges Laughing Cow - Light Herb & Garlic Cheese with sliced cucumber or green bell pepper  - 1/2 cup low-fat cottage cheese with ¼ cup fruit or ¼ cup salsa  - RTD Protein drinks: Atkins, Low Carb Slim Fast, EAS light, Muscle Milk Light, etc.  - Homemade Protein drinks: GNC Soy95, Isopure, Nectar, UNJURY, Whey Gourmet, etc. Mix 1 scoop powder with 8oz skim/1% milk or light soymilk.  - Protein bars: Atkins, EAS, Pure Protein, Think Thin, Detour, etc. Must have 0-4 grams sugar - Read the label.    Takeout Options: No more than twice/week  Deli - Salads (no pasta or rice), meats, cheeses. Roasted chicken. Lox (salmon)    Mexican - Platters which don't include tortillas, chips, or rice. Go easy on the beans. Example: Fajitas without the tortillas. Ask the  not to bring chips to the table if they are too tempting.    Greek - Meat or fish and vegetable, but no bread or rice. Including hummus, baba ganoush, etc, is OK. Most sit-down Greek restaurants can provide you with cucumber slices for dipping instead of pino bread.    Fast Food (Avoid as much as possible) - Salads (no croutons and limit salad dressing to 2 tbsp), grilled chicken sandwich without the bun and ask for no raygoza. Helens low fat chili or  Taco Bell pintos and cheese.    BBQ - The meats are fine if you ask for sauces on the side, but most of the traditional side dishes are loaded with carbs. Giovanni slaw, baked beans and BBQ sauce are typically made with sugar.    Chinese - Nothing deep-fried, no rice or noodles. Many Chinese sauces have starch and sugar in them, so you'll have to use your judgement. If you find that these sauces trigger cravings, or cause Dumping, you can ask for the sauce to be made without sugar or just use soy sauce.      Exercise should be some cardiovascular and some resistance training(see below).      STRENGTHENING  An adequate resistance training program could include the following types of exercise, focusing on the major muscle groups. One can use 5 to 10 pound dumbbells for those exercises that require the use of weight. At home, one can use a household item that provides a comfortable weight, such as a milk carton or beverage container. These exercises can also be performed without weights. Add some type of resistance training 2-3 days a week. These can be body weight exercises, light weight or elastic bands. [employer] and [employer] are great sources for free work out plans and videos.       Chest (Bench Press): Hold the weights with your hands. Lying on a flat surface, with knees bent and feet flat, slowly bring the weights to the chest area with palms facing upward. Begin to exhale and press the weights up, fully extending the arms, and keeping them above your eyes. Inhale as you lower them to the starting position and repeat the movement.  Back (Bent-Over Row): Start by placing your feet shoulder-width apart.  a weight with each hand just outside the knees. Keeping the back straight and the knees flexed, slightly bend forward at the waist. Let the arms hang naturally while holding the weights. From this starting position, pull the weights to the lower abdomen, keeping the elbows close to the body. Exhale as you pull.  Return to the starting position, inhaling as you go.  Arms (Bicep Curls): Hold a weight in each hand, with elbows at your sides, palms facing forward. The back should be straight, the chest flared outward. Begin to bend your right arm up first while exhaling, keeping the elbow totally stationary. Wait until the right arm goes completely down to the fully extended position, and then begin to curl the left arm. Each arm curled completes one full repetition.  Shoulders (Lateral Raises): Place the feet a few inches apart with the knees bent slightly. Keep the back erect as you lean forward slightly. With the weights in front of your thighs and palms facing together, begin to slowly raise them up to the side until parallel with the floor. Lower the weights to your thighs, and repeat.  Legs (Stiff Leg Dead Lift): Start by standing straight, holding the weights close to your sides, nearly touching your thighs. Keep the weights close to the body--this protects the back. The back must stay straight. Bend at the waist as far forward as you comfortably can while keeping your legs straight, and begin to feel the pull in your hamstrings as you lower the weights toward the ground. Slowly return to the starting position, keeping the back straight.  Legs (Dumbbell Lunge): Hold a weight in each hand, arms hanging at your sides. Step out with one leg, keeping the back straight. It is important to step out far enough so that the knee does not extend over the toe. This puts too much stress on the knee. Go down far enough so that the opposite knee nearly touches the ground. Keep this stance and repeat the lunging motion for several repetitions.  Abdominals: Do not perform standard sit-ups as these could hurt the lower back. Rather, focus on the following 2 exercises: (1) Obliques--Lie on your back with the knees flexed in the bent sit-up position. From this position, bring both knees down to the ground. With the back remaining flat,  "begin to flex your body toward your toes ("crunch"). Bring your shoulders up off the ground, but go slowly, controlling your momentum. Repeat this for 10 to 15 times. (2) Seated bench kicks/farzana knives--Sit on the end of a bench or chair with the hands placed behind the buttocks. Begin to kick the legs outward with the knees bent slightly--at the same time, lean back to extend the torso. Come back to the beginning position and repeat this motion 10 to 15 times.    "

## 2024-04-26 ENCOUNTER — TELEPHONE (OUTPATIENT)
Dept: BARIATRICS | Facility: CLINIC | Age: 54
End: 2024-04-26
Payer: COMMERCIAL

## 2024-04-26 NOTE — TELEPHONE ENCOUNTER
----- Message from Saira Gonsalves sent at 4/26/2024  1:56 PM CDT -----  Regarding: Rx  Contact: [phone]  Good afternoon the pt is inquiring if the semaglutide, weight loss, (WEGOVY) 0.25 mg/0.5 mL PnIj can be sent to compound pharmacy since the out of cost is expensive at a retail pharmacy.      Please send RX to and indicate that you approved it being filled at a compound pharmacy:    [address], [address]  [address] [place] [address], [address] [address]  Phone: [phone] Fax: [phone]

## 2024-04-29 NOTE — TELEPHONE ENCOUNTER
There are no FDA approved generic versions of semaglutide or terzepatide, so anything that is being manufactured or sold as such is not being regulated at all. The [place] board of pharmacy, FDA and Obesity [employer] Association have also recommended against the use, prescribing or distribution of these compounded formulations. I advise patients to avoid them, and will not treat patients using these compounded medications.

## 2024-04-30 ENCOUNTER — PATIENT MESSAGE (OUTPATIENT)
Dept: BARIATRICS | Facility: CLINIC | Age: 54
End: 2024-04-30
Payer: COMMERCIAL

## 2024-05-04 DIAGNOSIS — I10 BENIGN HYPERTENSION: Chronic | ICD-10-CM

## 2024-05-04 DIAGNOSIS — E78.2 MIXED DYSLIPIDEMIA: Chronic | ICD-10-CM

## 2024-05-04 DIAGNOSIS — E21.3 HYPERPARATHYROIDISM: Chronic | ICD-10-CM

## 2024-05-04 DIAGNOSIS — E55.9 VITAMIN D DEFICIENCY: Primary | Chronic | ICD-10-CM

## 2024-05-04 RX ORDER — ERGOCALCIFEROL 1.25 MG/1
50000 CAPSULE ORAL
Qty: 8 CAPSULE | Refills: 0 | Status: SHIPPED | OUTPATIENT
Start: 2024-05-04 | End: 2024-06-23

## 2024-05-06 ENCOUNTER — TELEPHONE (OUTPATIENT)
Dept: FAMILY MEDICINE | Facility: CLINIC | Age: 54
End: 2024-05-06
Payer: COMMERCIAL

## 2024-05-06 NOTE — TELEPHONE ENCOUNTER
----- Message from Karson Jackson Jr., MD sent at 5/4/2024  4:43 AM CDT -----  Please schedule six-month hypertension follow-up with labs to be done a week prior

## 2024-05-24 DIAGNOSIS — E55.9 VITAMIN D DEFICIENCY: Chronic | ICD-10-CM

## 2024-05-24 NOTE — TELEPHONE ENCOUNTER
No care due was identified.  [facility] Embedded Care Due Messages. Reference number: [id number].   5/24/2024 8:32:24 AM CDT

## 2024-05-26 RX ORDER — ERGOCALCIFEROL 1.25 MG/1
50000 CAPSULE ORAL
Qty: 12 CAPSULE | Refills: 1 | OUTPATIENT
Start: 2024-05-26 | End: 2024-07-15

## 2024-05-27 NOTE — TELEPHONE ENCOUNTER
Please contact patient.  Reminder to review previous message that once her prescription vitamin-D finish, she was to continue an over-the-counter vitamin-D 2000 units once daily, and that the prescription would no longer be needed

## 2024-07-26 DIAGNOSIS — I10 BENIGN HYPERTENSION: Chronic | ICD-10-CM

## 2024-07-26 RX ORDER — METOPROLOL TARTRATE 50 MG/1
50 TABLET ORAL 2 TIMES DAILY
Qty: 180 TABLET | Refills: 2 | Status: SHIPPED | OUTPATIENT
Start: 2024-07-26

## 2024-07-26 NOTE — TELEPHONE ENCOUNTER
Refill Routing Note   Medication(s) are not appropriate for processing by [facility] for the following reason(s):        Required vitals abnormal    [facility] action(s):  Defer               Appointments  past 12m or future 3m with PCP    Date Provider   Last Visit   3/21/2024 Karson Jackson Jr., MD   Next Visit   11/6/2024 Karson Jackson Jr., MD   ED visits in past 90 days: 0        Note composed:1:42 PM 07/26/2024

## 2024-07-26 NOTE — TELEPHONE ENCOUNTER
No care due was identified.  [facility] Embedded Care Due Messages. Reference number: [id number].   7/26/2024 12:06:01 AM CDT

## 2024-08-09 ENCOUNTER — OFFICE VISIT (OUTPATIENT)
Dept: NEUROLOGY | Facility: CLINIC | Age: 54
End: 2024-08-09
Payer: COMMERCIAL

## 2024-08-09 DIAGNOSIS — G44.40 MEDICATION OVERUSE HEADACHE: ICD-10-CM

## 2024-08-09 DIAGNOSIS — G43.709 CHRONIC MIGRAINE WITHOUT AURA WITHOUT STATUS MIGRAINOSUS, NOT INTRACTABLE: Primary | ICD-10-CM

## 2024-08-09 DIAGNOSIS — G47.00 INSOMNIA, UNSPECIFIED TYPE: ICD-10-CM

## 2024-08-09 RX ORDER — GALCANEZUMAB 120 MG/ML
120 INJECTION, SOLUTION SUBCUTANEOUS
Qty: 1 ML | Refills: 5 | Status: SHIPPED | OUTPATIENT
Start: 2024-08-09 | End: 2025-02-05

## 2024-08-09 RX ORDER — UBROGEPANT 100 MG/1
100 TABLET ORAL
Qty: 16 TABLET | Refills: 5 | Status: SHIPPED | OUTPATIENT
Start: 2024-08-09

## 2024-08-26 NOTE — NURSING
Chart check completed, elevated MEWS score noted, Kris POLO, contacted, no concerns verbalized at this time, instructed to call [phone] for further concerns or assistance.    Vitals:    01/23/18 2030   BP: 100/59   Pulse: 112   Resp: 18   Temp: 98.4 Oral        [NL (0)] : extension 0 degrees [5___] : hamstring 5[unfilled]/5 [Negative] : negative Tammy's [Left] : left knee [] : no pain with varus stress [[id number]] : flexion 110 degrees

## 2024-09-30 ENCOUNTER — HOSPITAL ENCOUNTER (INPATIENT)
Facility: HOSPITAL | Age: 54
LOS: 2 days | Discharge: HOME | DRG: 125 | End: 2024-10-03
Attending: STUDENT IN AN ORGANIZED HEALTH CARE EDUCATION/TRAINING PROGRAM | Admitting: PSYCHIATRY & NEUROLOGY
Payer: COMMERCIAL

## 2024-09-30 DIAGNOSIS — H54.60 MONOCULAR VISION LOSS: ICD-10-CM

## 2024-09-30 DIAGNOSIS — H34.10 CENTRAL RETINAL ARTERY OCCLUSION: ICD-10-CM

## 2024-09-30 DIAGNOSIS — H34.10 CRAO (CENTRAL RETINAL ARTERY OCCLUSION): ICD-10-CM

## 2024-09-30 DIAGNOSIS — H34.11 CENTRAL RETINAL ARTERY OCCLUSION OF RIGHT EYE: Primary | ICD-10-CM

## 2024-09-30 LAB
ALBUMIN SERPL BCP-MCNC: 3.5 G/DL (ref 3.5–5.2)
ALP SERPL-CCNC: 80 U/L (ref 55–135)
ALT SERPL W/O P-5'-P-CCNC: 19 U/L (ref 10–44)
ANION GAP SERPL CALC-SCNC: 9 MMOL/L (ref 8–16)
AST SERPL-CCNC: 25 U/L (ref 10–40)
BASOPHILS # BLD AUTO: 0.03 K/UL (ref 0–0.2)
BASOPHILS NFR BLD: 0.6 % (ref 0–1.9)
BILIRUB SERPL-MCNC: 0.5 MG/DL (ref 0.1–1)
BUN SERPL-MCNC: 9 MG/DL (ref 6–20)
CALCIUM SERPL-MCNC: 9.3 MG/DL (ref 8.7–10.5)
CHLORIDE SERPL-SCNC: 109 MMOL/L (ref 95–110)
CO2 SERPL-SCNC: 25 MMOL/L (ref 23–29)
CREAT SERPL-MCNC: 1 MG/DL (ref 0.5–1.4)
DIFFERENTIAL METHOD BLD: ABNORMAL
EOSINOPHIL # BLD AUTO: 0.1 K/UL (ref 0–0.5)
EOSINOPHIL NFR BLD: 2 % (ref 0–8)
ERYTHROCYTE [DISTWIDTH] IN BLOOD BY AUTOMATED COUNT: 13.7 % (ref 11.5–14.5)
EST. GFR  (NO RACE VARIABLE): >60 ML/MIN/1.73 M^2
GLUCOSE SERPL-MCNC: 113 MG/DL (ref 70–110)
HCT VFR BLD AUTO: 43.7 % (ref 37–48.5)
HGB BLD-MCNC: 13.5 G/DL (ref 12–16)
IMM GRANULOCYTES # BLD AUTO: 0.01 K/UL (ref 0–0.04)
IMM GRANULOCYTES NFR BLD AUTO: 0.2 % (ref 0–0.5)
INR PPP: 1 (ref 0.8–1.2)
LYMPHOCYTES # BLD AUTO: 1.9 K/UL (ref 1–4.8)
LYMPHOCYTES NFR BLD: 37.6 % (ref 18–48)
MCH RBC QN AUTO: 26.6 PG (ref 27–31)
MCHC RBC AUTO-ENTMCNC: 30.9 G/DL (ref 32–36)
MCV RBC AUTO: 86 FL (ref 82–98)
MONOCYTES # BLD AUTO: 0.4 K/UL (ref 0.3–1)
MONOCYTES NFR BLD: 7.6 % (ref 4–15)
NEUTROPHILS # BLD AUTO: 2.7 K/UL (ref 1.8–7.7)
NEUTROPHILS NFR BLD: 52 % (ref 38–73)
NRBC BLD-RTO: 0 /100 WBC
PLATELET # BLD AUTO: 201 K/UL (ref 150–450)
PMV BLD AUTO: 10.3 FL (ref 9.2–12.9)
POTASSIUM SERPL-SCNC: 3.4 MMOL/L (ref 3.5–5.1)
PROT SERPL-MCNC: 6.7 G/DL (ref 6–8.4)
PROTHROMBIN TIME: 10.8 SEC (ref 9–12.5)
RBC # BLD AUTO: 5.08 M/UL (ref 4–5.4)
SODIUM SERPL-SCNC: 143 MMOL/L (ref 136–145)
WBC # BLD AUTO: 5.11 K/UL (ref 3.9–12.7)

## 2024-09-30 PROCEDURE — 85610 PROTHROMBIN TIME: CPT | Performed by: STUDENT IN AN ORGANIZED HEALTH CARE EDUCATION/TRAINING PROGRAM

## 2024-09-30 PROCEDURE — 85025 COMPLETE CBC W/AUTO DIFF WBC: CPT | Performed by: STUDENT IN AN ORGANIZED HEALTH CARE EDUCATION/TRAINING PROGRAM

## 2024-09-30 PROCEDURE — 80053 COMPREHEN METABOLIC PANEL: CPT | Performed by: STUDENT IN AN ORGANIZED HEALTH CARE EDUCATION/TRAINING PROGRAM

## 2024-09-30 PROCEDURE — 25000003 PHARM REV CODE 250: Performed by: STUDENT IN AN ORGANIZED HEALTH CARE EDUCATION/TRAINING PROGRAM

## 2024-09-30 RX ORDER — ATROPINE SULFATE 10 MG/ML
1 SOLUTION/ DROPS OPHTHALMIC
Status: DISCONTINUED | OUTPATIENT
Start: 2024-09-30 | End: 2024-09-30

## 2024-09-30 RX ORDER — NAPROXEN SODIUM 220 MG/1
324 TABLET, FILM COATED ORAL DAILY
Status: DISCONTINUED | OUTPATIENT
Start: 2024-10-01 | End: 2024-09-30

## 2024-09-30 NOTE — LETTER
"October 4, 2024             [facility]  [address] [address]  Phone: [phone]  October 4, 2024     Patient: Alondra Carrera (Pamela)     YOB: 1970        To Whom It May Concern:    Alondra Carrera was admitted to the hospital on 9/30/2024 10:44 PM and discharged on 10/3/2024.  She may return to work on 10/7/2024 .  If I can be of any further assistance, please do not hesitate to contact me.    Sincerely,        Lina Tovar, BRE  Department of Vascular Neurology     "

## 2024-10-01 PROBLEM — E87.6 HYPOKALEMIA: Status: ACTIVE | Noted: 2024-10-01

## 2024-10-01 PROBLEM — F41.1 GAD (GENERALIZED ANXIETY DISORDER): Chronic | Status: ACTIVE | Noted: 2018-12-28

## 2024-10-01 PROBLEM — I10 HYPERTENSION: Status: ACTIVE | Noted: 2024-10-01

## 2024-10-01 PROBLEM — H34.11 CENTRAL RETINAL ARTERY OCCLUSION OF RIGHT EYE: Status: ACTIVE | Noted: 2024-10-01

## 2024-10-01 PROBLEM — E78.5 HYPERLIPIDEMIA: Chronic | Status: ACTIVE | Noted: 2024-10-01

## 2024-10-01 PROBLEM — I10 HYPERTENSION: Chronic | Status: ACTIVE | Noted: 2024-10-01

## 2024-10-01 PROBLEM — E78.5 HYPERLIPIDEMIA: Status: ACTIVE | Noted: 2024-10-01

## 2024-10-01 PROBLEM — Z98.890 S/P MVR (MITRAL VALVE REPAIR): Chronic | Status: ACTIVE | Noted: 2018-01-19

## 2024-10-01 LAB
ALBUMIN SERPL BCP-MCNC: 3.6 G/DL (ref 3.5–5.2)
ALP SERPL-CCNC: 80 U/L (ref 55–135)
ALT SERPL W/O P-5'-P-CCNC: 22 U/L (ref 10–44)
ANION GAP SERPL CALC-SCNC: 8 MMOL/L (ref 8–16)
APTT PPP: 25.4 SEC (ref 21–32)
APTT PPP: 26.6 SEC (ref 21–32)
ASCENDING AORTA: 3.47 CM
AST SERPL-CCNC: 25 U/L (ref 10–40)
AV AREA BY CONTINUOUS VTI: 3.8 CM2
AV INDEX (PROSTH): 0.9
AV LVOT MEAN GRADIENT: 2 MMHG
AV LVOT PEAK GRADIENT: 3 MMHG
AV MEAN GRADIENT: 2.8 MMHG
AV PEAK GRADIENT: 4.8 MMHG
AV VALVE AREA BY VELOCITY RATIO: 3.4 CM²
AV VALVE AREA: 3.7 CM2
AV VELOCITY RATIO: 0.82
BASOPHILS # BLD AUTO: 0.02 K/UL (ref 0–0.2)
BASOPHILS NFR BLD: 0.4 % (ref 0–1.9)
BILIRUB SERPL-MCNC: 0.5 MG/DL (ref 0.1–1)
BILIRUB UR QL STRIP: NEGATIVE
BSA FOR ECHO PROCEDURE: 2.16 M2
BUN SERPL-MCNC: 9 MG/DL (ref 6–20)
CALCIUM SERPL-MCNC: 9.5 MG/DL (ref 8.7–10.5)
CHLORIDE SERPL-SCNC: 110 MMOL/L (ref 95–110)
CHOLEST SERPL-MCNC: 149 MG/DL (ref 120–199)
CHOLEST/HDLC SERPL: 3.1 {RATIO} (ref 2–5)
CLARITY UR REFRACT.AUTO: CLEAR
CO2 SERPL-SCNC: 25 MMOL/L (ref 23–29)
COLOR UR AUTO: YELLOW
CREAT SERPL-MCNC: 0.9 MG/DL (ref 0.5–1.4)
CRP SERPL-MCNC: 0.9 MG/L (ref 0–8.2)
CV ECHO LV RWT: 0.36 CM
DIFFERENTIAL METHOD BLD: ABNORMAL
DOP CALC AO PEAK VEL: 1.1 M/S
DOP CALC AO VTI: 23.4 CM
DOP CALC LVOT AREA: 4.2 CM2
DOP CALC LVOT DIAMETER: 2.3 CM
DOP CALC LVOT PEAK VEL: 0.9 M/S
DOP CALC LVOT STROKE VOLUME: 87.6 CM3
DOP CALCLVOT PEAK VEL VTI: 21.1 CM
E WAVE DECELERATION TIME: 308.8 MS
E/A RATIO: 1.39
E/E' RATIO: 10.45 M/S
ECHO EF ESTIMATED: 72 %
ECHO LV POSTERIOR WALL: 0.8 CM (ref 0.6–1.1)
EOSINOPHIL # BLD AUTO: 0.1 K/UL (ref 0–0.5)
EOSINOPHIL NFR BLD: 2.2 % (ref 0–8)
ERYTHROCYTE [DISTWIDTH] IN BLOOD BY AUTOMATED COUNT: 13.8 % (ref 11.5–14.5)
ERYTHROCYTE [SEDIMENTATION RATE] IN BLOOD BY PHOTOMETRIC METHOD: 12 MM/HR (ref 0–36)
EST. GFR  (NO RACE VARIABLE): >60 ML/MIN/1.73 M^2
ESTIMATED AVG GLUCOSE: 97 MG/DL (ref 68–131)
FRACTIONAL SHORTENING: 40.9 % (ref 28–44)
GLUCOSE SERPL-MCNC: 90 MG/DL (ref 70–110)
GLUCOSE UR QL STRIP: NEGATIVE
HBA1C MFR BLD: 5 % (ref 4–5.6)
HCT VFR BLD AUTO: 44.3 % (ref 37–48.5)
HDLC SERPL-MCNC: 48 MG/DL (ref 40–75)
HDLC SERPL: 32.2 % (ref 20–50)
HGB BLD-MCNC: 13.7 G/DL (ref 12–16)
HGB UR QL STRIP: NEGATIVE
IMM GRANULOCYTES # BLD AUTO: 0 K/UL (ref 0–0.04)
IMM GRANULOCYTES NFR BLD AUTO: 0 % (ref 0–0.5)
INR PPP: 1 (ref 0.8–1.2)
INTERVENTRICULAR SEPTUM: 1.1 CM (ref 0.6–1.1)
KETONES UR QL STRIP: NEGATIVE
LA MAJOR: 5.57 CM
LA MINOR: 4.86 CM
LA WIDTH: 4.01 CM
LDLC SERPL CALC-MCNC: 70.6 MG/DL (ref 63–159)
LEFT ATRIUM SIZE: 4.08 CM
LEFT ATRIUM VOLUME INDEX MOD: 26.7 ML/M2
LEFT ATRIUM VOLUME INDEX: 34.5 ML/M2
LEFT ATRIUM VOLUME MOD: 55.8 ML
LEFT ATRIUM VOLUME: 72.19 CM3
LEFT INTERNAL DIMENSION IN SYSTOLE: 2.6 CM (ref 2.1–4)
LEFT VENTRICLE DIASTOLIC VOLUME INDEX: 41.91 ML/M2
LEFT VENTRICLE DIASTOLIC VOLUME: 87.59 ML
LEFT VENTRICLE MASS INDEX: 65.9 G/M2
LEFT VENTRICLE SYSTOLIC VOLUME INDEX: 11.8 ML/M2
LEFT VENTRICLE SYSTOLIC VOLUME: 24.76 ML
LEFT VENTRICULAR INTERNAL DIMENSION IN DIASTOLE: 4.4 CM (ref 3.5–6)
LEFT VENTRICULAR MASS: 137.8 G
LEUKOCYTE ESTERASE UR QL STRIP: NEGATIVE
LV LATERAL E/E' RATIO: 8.85
LV SEPTAL E/E' RATIO: 12.78
LYMPHOCYTES # BLD AUTO: 1.6 K/UL (ref 1–4.8)
LYMPHOCYTES NFR BLD: 35.1 % (ref 18–48)
MAGNESIUM SERPL-MCNC: 1.9 MG/DL (ref 1.6–2.6)
MCH RBC QN AUTO: 26.3 PG (ref 27–31)
MCHC RBC AUTO-ENTMCNC: 30.9 G/DL (ref 32–36)
MCV RBC AUTO: 85 FL (ref 82–98)
MONOCYTES # BLD AUTO: 0.4 K/UL (ref 0.3–1)
MONOCYTES NFR BLD: 7.7 % (ref 4–15)
MV A" WAVE DURATION": 95.15 MS
MV PEAK A VEL: 0.83 M/S
MV PEAK E VEL: 1.15 M/S
NEUTROPHILS # BLD AUTO: 2.5 K/UL (ref 1.8–7.7)
NEUTROPHILS NFR BLD: 54.6 % (ref 38–73)
NITRITE UR QL STRIP: NEGATIVE
NONHDLC SERPL-MCNC: 101 MG/DL
NRBC BLD-RTO: 0 /100 WBC
OHS CV RV/LV RATIO: 0.64 CM
OHS QRS DURATION: 88 MS
OHS QTC CALCULATION: 448 MS
PH UR STRIP: 6 [PH] (ref 5–8)
PHOSPHATE SERPL-MCNC: 3.9 MG/DL (ref 2.7–4.5)
PISA TR MAX VEL: 2.12 M/S
PLATELET # BLD AUTO: 190 K/UL (ref 150–450)
PMV BLD AUTO: 11.1 FL (ref 9.2–12.9)
POTASSIUM SERPL-SCNC: 3.8 MMOL/L (ref 3.5–5.1)
PROT SERPL-MCNC: 6.9 G/DL (ref 6–8.4)
PROT UR QL STRIP: NEGATIVE
PROTHROMBIN TIME: 10.9 SEC (ref 9–12.5)
PULM VEIN A" WAVE DURATION": 95.15 MS
PULM VEIN S/D RATIO: 1.29
PULMONIC VEIN PEAK A VELOCITY: 0.2 M/S
PV PEAK D VEL: 0.35 M/S
PV PEAK S VEL: 0.45 M/S
RA MAJOR: 5.96 CM
RA PRESSURE ESTIMATED: 3 MMHG
RA WIDTH: 3.69 CM
RBC # BLD AUTO: 5.21 M/UL (ref 4–5.4)
RIGHT ATRIAL AREA: 21.4 CM2
RIGHT VENTRICLE DIASTOLIC BASEL DIMENSION: 2.8 CM
RV TB RVSP: 5 MMHG
RV TISSUE DOPPLER FREE WALL SYSTOLIC VELOCITY 1 (APICAL 4 CHAMBER VIEW): 10.87 CM/S
SINUS: 3.23 CM
SODIUM SERPL-SCNC: 143 MMOL/L (ref 136–145)
SP GR UR STRIP: 1.03 (ref 1–1.03)
STJ: 3.09 CM
TDI LATERAL: 0.13 M/S
TDI SEPTAL: 0.09 M/S
TDI: 0.11 M/S
TR MAX PG: 18 MMHG
TRICUSPID ANNULAR PLANE SYSTOLIC EXCURSION: 2.05 CM
TRIGL SERPL-MCNC: 152 MG/DL (ref 30–150)
TSH SERPL DL<=0.005 MIU/L-ACNC: 1.25 UIU/ML (ref 0.4–4)
TV PEAK GRADIENT: 18 MMHG
TV REST PULMONARY ARTERY PRESSURE: 21 MMHG
URN SPEC COLLECT METH UR: NORMAL
WBC # BLD AUTO: 4.53 K/UL (ref 3.9–12.7)
Z-SCORE OF LEFT VENTRICULAR DIMENSION IN END DIASTOLE: -3.82
Z-SCORE OF LEFT VENTRICULAR DIMENSION IN END SYSTOLE: -3.29

## 2024-10-01 PROCEDURE — 81003 URINALYSIS AUTO W/O SCOPE: CPT

## 2024-10-01 PROCEDURE — 85610 PROTHROMBIN TIME: CPT

## 2024-10-01 PROCEDURE — 85025 COMPLETE CBC W/AUTO DIFF WBC: CPT

## 2024-10-01 PROCEDURE — 25500020 PHARM REV CODE 255: Performed by: PSYCHIATRY & NEUROLOGY

## 2024-10-01 PROCEDURE — 99223 1ST HOSP IP/OBS HIGH 75: CPT | Mod: ,,, | Performed by: PSYCHIATRY & NEUROLOGY

## 2024-10-01 PROCEDURE — 25000003 PHARM REV CODE 250

## 2024-10-01 PROCEDURE — 85730 THROMBOPLASTIN TIME PARTIAL: CPT | Mod: 91 | Performed by: EMERGENCY MEDICINE

## 2024-10-01 PROCEDURE — 85730 THROMBOPLASTIN TIME PARTIAL: CPT

## 2024-10-01 PROCEDURE — 84100 ASSAY OF PHOSPHORUS: CPT

## 2024-10-01 PROCEDURE — 80061 LIPID PANEL: CPT | Performed by: EMERGENCY MEDICINE

## 2024-10-01 PROCEDURE — 25000003 PHARM REV CODE 250: Performed by: EMERGENCY MEDICINE

## 2024-10-01 PROCEDURE — 11000001 HC ACUTE MED/SURG PRIVATE ROOM

## 2024-10-01 PROCEDURE — 99285 EMERGENCY DEPT VISIT HI MDM: CPT | Mod: 25

## 2024-10-01 PROCEDURE — 63600175 PHARM REV CODE 636 W HCPCS

## 2024-10-01 PROCEDURE — 86140 C-REACTIVE PROTEIN: CPT | Performed by: EMERGENCY MEDICINE

## 2024-10-01 PROCEDURE — 97165 OT EVAL LOW COMPLEX 30 MIN: CPT

## 2024-10-01 PROCEDURE — 93005 ELECTROCARDIOGRAM TRACING: CPT

## 2024-10-01 PROCEDURE — 85652 RBC SED RATE AUTOMATED: CPT | Performed by: EMERGENCY MEDICINE

## 2024-10-01 PROCEDURE — 80053 COMPREHEN METABOLIC PANEL: CPT

## 2024-10-01 PROCEDURE — 83735 ASSAY OF MAGNESIUM: CPT

## 2024-10-01 PROCEDURE — 93010 ELECTROCARDIOGRAM REPORT: CPT | Mod: ,,, | Performed by: INTERNAL MEDICINE

## 2024-10-01 PROCEDURE — 83036 HEMOGLOBIN GLYCOSYLATED A1C: CPT | Performed by: EMERGENCY MEDICINE

## 2024-10-01 PROCEDURE — 84443 ASSAY THYROID STIM HORMONE: CPT

## 2024-10-01 RX ORDER — BISACODYL 10 MG/1
10 SUPPOSITORY RECTAL DAILY PRN
Status: DISCONTINUED | OUTPATIENT
Start: 2024-10-01 | End: 2024-10-03 | Stop reason: HOSPADM

## 2024-10-01 RX ORDER — POLYETHYLENE GLYCOL 3350 17 G/17G
17 POWDER, FOR SOLUTION ORAL 2 TIMES DAILY PRN
Status: DISCONTINUED | OUTPATIENT
Start: 2024-10-01 | End: 2024-10-03 | Stop reason: HOSPADM

## 2024-10-01 RX ORDER — LABETALOL HCL 20 MG/4 ML
10 SYRINGE (ML) INTRAVENOUS EVERY 6 HOURS PRN
Status: DISCONTINUED | OUTPATIENT
Start: 2024-10-01 | End: 2024-10-03 | Stop reason: HOSPADM

## 2024-10-01 RX ORDER — ASPIRIN 325 MG
325 TABLET, DELAYED RELEASE (ENTERIC COATED) ORAL ONCE
Status: COMPLETED | OUTPATIENT
Start: 2024-10-01 | End: 2024-10-01

## 2024-10-01 RX ORDER — ATORVASTATIN CALCIUM 40 MG/1
40 TABLET, FILM COATED ORAL DAILY
Status: DISCONTINUED | OUTPATIENT
Start: 2024-10-01 | End: 2024-10-03 | Stop reason: HOSPADM

## 2024-10-01 RX ORDER — MAGNESIUM SULFATE HEPTAHYDRATE 40 MG/ML
2 INJECTION, SOLUTION INTRAVENOUS ONCE
Status: COMPLETED | OUTPATIENT
Start: 2024-10-01 | End: 2024-10-01

## 2024-10-01 RX ORDER — ACETAMINOPHEN 325 MG/1
650 TABLET ORAL EVERY 6 HOURS PRN
Status: DISCONTINUED | OUTPATIENT
Start: 2024-10-01 | End: 2024-10-03 | Stop reason: HOSPADM

## 2024-10-01 RX ORDER — SODIUM CHLORIDE 9 MG/ML
INJECTION, SOLUTION INTRAVENOUS CONTINUOUS
Status: ACTIVE | OUTPATIENT
Start: 2024-10-01 | End: 2024-10-01

## 2024-10-01 RX ORDER — ASPIRIN 81 MG/1
81 TABLET ORAL DAILY
Status: DISCONTINUED | OUTPATIENT
Start: 2024-10-01 | End: 2024-10-03 | Stop reason: HOSPADM

## 2024-10-01 RX ORDER — CLOPIDOGREL BISULFATE 75 MG/1
300 TABLET ORAL ONCE
Status: COMPLETED | OUTPATIENT
Start: 2024-10-01 | End: 2024-10-01

## 2024-10-01 RX ORDER — SODIUM CHLORIDE 0.9 % (FLUSH) 0.9 %
10 SYRINGE (ML) INJECTION
Status: DISCONTINUED | OUTPATIENT
Start: 2024-10-01 | End: 2024-10-03 | Stop reason: HOSPADM

## 2024-10-01 RX ORDER — CLOPIDOGREL BISULFATE 75 MG/1
75 TABLET ORAL DAILY
Status: DISCONTINUED | OUTPATIENT
Start: 2024-10-01 | End: 2024-10-03 | Stop reason: HOSPADM

## 2024-10-01 RX ORDER — HEPARIN SODIUM 5000 [USP'U]/ML
5000 INJECTION, SOLUTION INTRAVENOUS; SUBCUTANEOUS EVERY 8 HOURS
Status: DISCONTINUED | OUTPATIENT
Start: 2024-10-01 | End: 2024-10-03 | Stop reason: HOSPADM

## 2024-10-01 RX ORDER — AMOXICILLIN 250 MG
1 CAPSULE ORAL 2 TIMES DAILY PRN
Status: DISCONTINUED | OUTPATIENT
Start: 2024-10-01 | End: 2024-10-03 | Stop reason: HOSPADM

## 2024-10-01 RX ORDER — ONDANSETRON 8 MG/1
8 TABLET, ORALLY DISINTEGRATING ORAL EVERY 8 HOURS PRN
Status: DISCONTINUED | OUTPATIENT
Start: 2024-10-01 | End: 2024-10-03 | Stop reason: HOSPADM

## 2024-10-01 RX ORDER — ACETAMINOPHEN 500 MG
1000 TABLET ORAL
Status: COMPLETED | OUTPATIENT
Start: 2024-10-01 | End: 2024-10-01

## 2024-10-01 RX ADMIN — ASPIRIN 81 MG: 81 TABLET, COATED ORAL at 10:10

## 2024-10-01 RX ADMIN — HEPARIN SODIUM 5000 UNITS: 5000 INJECTION INTRAVENOUS; SUBCUTANEOUS at 08:10

## 2024-10-01 RX ADMIN — CLOPIDOGREL BISULFATE 300 MG: 75 TABLET ORAL at 05:10

## 2024-10-01 RX ADMIN — CLOPIDOGREL BISULFATE 75 MG: 75 TABLET ORAL at 10:10

## 2024-10-01 RX ADMIN — SODIUM CHLORIDE: 9 INJECTION, SOLUTION INTRAVENOUS at 08:10

## 2024-10-01 RX ADMIN — ATORVASTATIN CALCIUM 40 MG: 40 TABLET, FILM COATED ORAL at 05:10

## 2024-10-01 RX ADMIN — ASPIRIN 325 MG: 325 TABLET, COATED ORAL at 05:10

## 2024-10-01 RX ADMIN — IOHEXOL 100 ML: 350 INJECTION, SOLUTION INTRAVENOUS at 11:10

## 2024-10-01 RX ADMIN — ACETAMINOPHEN 1000 MG: 500 TABLET ORAL at 02:10

## 2024-10-01 RX ADMIN — MAGNESIUM SULFATE HEPTAHYDRATE 2 G: 40 INJECTION, SOLUTION INTRAVENOUS at 06:10

## 2024-10-01 RX ADMIN — SODIUM CHLORIDE: 9 INJECTION, SOLUTION INTRAVENOUS at 06:10

## 2024-10-01 RX ADMIN — HEPARIN SODIUM 5000 UNITS: 5000 INJECTION INTRAVENOUS; SUBCUTANEOUS at 05:10

## 2024-10-01 RX ADMIN — HEPARIN SODIUM 5000 UNITS: 5000 INJECTION INTRAVENOUS; SUBCUTANEOUS at 03:10

## 2024-10-01 RX ADMIN — ACETAMINOPHEN 650 MG: 325 TABLET ORAL at 05:10

## 2024-10-01 NOTE — NURSING
Patient Transferred to NPU Room 948       Upon arrival to the floor, patient greeted and oriented to room. Complete head to toe assessment completed per protocol. VSS, see flowsheet for details. Neuro assessment completed. Primary team notified of patient's transfer to floor. All current and transfer orders reviewed/reconciled per protocol. All emergency equipment set up in patient's room. Fall/seizure precautions initiated per providers orders. 4 Eyes skin assessment performed, see below for details. Reviewed assessment and rounding frequency with patient and family. Questions were encouraged and addressed. Repositioned patient for comfort with bed locked in lowest position, side rails up x 3, bed alarm set, and call light within reach. Instructed patient to call staff for mobility/assistance, verbalized understanding. No acute signs of distress noted at this time.

## 2024-10-01 NOTE — ASSESSMENT & PLAN NOTE
Patient suffers with daily migraine headaches; received IV Mg in the ED that helped    - Her abortive ubrelvy is not on formulary, will give another dose of IV Mg.

## 2024-10-01 NOTE — PT/OT/SLP PROGRESS
Occupational Therapy      Patient Name:  Alondra Carrera   MRN:  [id number]    Pt off unit for CT scan this morning. Will reattempt at later times as able.   10/1/2024

## 2024-10-01 NOTE — ASSESSMENT & PLAN NOTE
Alondra Carrera is a 54 year old female with history of migraine, MVP s/p repair, htn, and hld who presents with painless monocular vision loss of the right eye while watching TV at 10 PM 9/30. Ophthalmology exam consistent with CRAO given pale retina and cherry red spot. Her vision has largely improved, now with generalized blurriness. Recommend completing stroke work up and start dual antiplatelet therapy.     - Obtain MRI/MRA brain and MRA neck   - Echo with bubble study  - Start aspirin 325 mg x 1 and plavix 300 mg x 1  - Aspirin 81 mg and plavix 75 mg daily thereafter  - Atorvastatin 40 mg  - Daily CBC, CMP, Mg, P  - A1c, TSH, UA, Troponin, PT, PTT x 1  - Heparin dvt ppx  - Maintain SBP<220  - PT/OT evaluation  - Cardiac diet

## 2024-10-01 NOTE — HPI
Alondra Carrera is 54 year old female with history of migraine (on emgality, topamax, and ubrelvy), MVP s/p repair 2018, htn, and hld who presents with painless right monocular vision loss at 10 PM 9/30 while watching TV. Opthalmology evaluated and her exam is most consistent with CRAO. Her vision has improved since arrival to the ED, first with restored vision temporally, now mainly blurriness throughout her visual field; NIH 0. CT Head is non specific. Recommend obtaining MRI/MRA brain and neck, echo with bubble study, and starting DAPT.

## 2024-10-01 NOTE — ED TRIAGE NOTES
Pt c/o painless right eye total vision loss that started before she arrived while watching TV tonight. Pt denies vision changes to left eye. States she does have a 8/10 HA but this is normal for her. Denies dizziness, N/V, or body weakness.

## 2024-10-01 NOTE — PLAN OF CARE
End of Shift Note    Pertinent Events this Shift: VSS, Pt on RA, No acute events. CT and MRI done today.     Nursing Concerns: None     Pt/Family concerns: None     Mental Status: A/O x4, FC.     Mobility:Pt is independent     Barriers towards goals/Discharge: Not medically ready.         Cinthia Jung RN

## 2024-10-01 NOTE — PT/OT/SLP EVAL
"Occupational Therapy   Evaluation and Discharge Note    Name: Alondra Carrera  MRN: [id number]  Admitting Diagnosis: Central retinal artery occlusion of right eye  Recent Surgery: * No surgery found *      Recommendations:     Discharge Recommendations: No Therapy Indicated  Discharge Equipment Recommendations: none  Barriers to discharge:  None    Assessment:     Alondra Carrera is a 54 y.o. female with a medical diagnosis of Central retinal artery occlusion of right eye. At this time, patient is functioning at their prior level of function and does not require further acute OT services.     Plan:     During this hospitalization, patient does not require further acute OT services.  Please re-consult if situation changes.    Plan of Care Reviewed with: patient, spouse    Subjective     Patient:  "I was watching TV and I had a change in my vision.  I lost vision in my right eye.  It is much better now; just blurry."    Occupational Profile:  Patient resides in [place] with her , dtg and son in 2 story home with bedroom on the 2nd floor.  Patient is right handed.  PTA patient independent with ADLs including driving.  Currently owns no DME.  Works from home.  Hobbies: movies, TV.     Pain/Comfort:  Pain Rating 1: 0/10  Pain Rating Post-Intervention 1: 0/10    Patients cultural, spiritual, Bahai conflicts given the current situation: no    Objective:     Communicated with: Nurse prior to session.  Patient found supine with telemetry upon OT entry to room.    General Precautions: Standard, vision impaired  Orthopedic Precautions: N/A  Braces: N/A  Respiratory Status: Room air     Occupational Performance:    Bed Mobility:    Patient completed Rolling/Turning to Left with  independence  Patient completed Rolling/Turning to Right with independence  Patient completed Supine to Sit with independence  Patient completed Sit to Supine with independence    Functional Mobility/Transfers:  Patient completed Sit <> Stand " Transfer with modified independence  with  no assistive device   Patient completed Bed <> Chair Transfer using Stand Pivot technique with modified independence with no assistive device    Activities of Daily Living:  Grooming: modified independence    Upper Body Dressing: modified independence    Lower Body Dressing: modified independence    Toileting: modified independence      Cognitive/Visual Perceptual:  Cognitive/Psychosocial Skills:     -       Oriented to: Person, Place, Time, and Situation   -       Follows Commands/attention:Follows two-step commands  -       Communication: clear/fluent  R eye blurriness    Physical Exam:  Postural examination/scapula alignment:    -       Rounded shoulders  Edema:  None noted  Upper Extremity Range of Motion:     -       Right Upper Extremity: WFL  -       Left Upper Extremity: WFL  Upper Extremity Strength:    -       Right Upper Extremity: WFL  -       Left Upper Extremity: WFL    AMPAC 6 Click ADL:  AMPAC Total Score: 24    Treatment & Education:  Patient/ Family education provided for stroke warning signs, prevention guidelines and personal risk factors.  Patient/ Family verbalizing understanding via teach back method.  Patient education provided on role of OT and need for no further OT upon discharge.  Patient alert and oriented x 3; able to follow 4/4 one step commands.  Patient attentive and interactive throughout the session.  Patient able to identify 5/5 body parts.  Able to name 5/5 objects.  Able to sequence 7/7 days of the week and 12/12 months of the year.  Able to identify 20/20 items on visual scanning task.  Able to tell time on clock without error.       Modified Denhoff Scale:  1/6.    Patient left supine with all lines intact, call button in reach, and bed alarm on    GOALS:   Multidisciplinary Problems       Occupational Therapy Goals       Not on file              Multidisciplinary Problems (Resolved)          Problem: Occupational Therapy    Goal  Priority Disciplines Outcome Interventions   Occupational Therapy Goal   (Resolved)     OT, PT/OT Met    Description: Goals not set 2* no further OT needed                       History:     Past Medical History:   Diagnosis Date    Genital herpes, unspecified     Hypertension     Mental disorder     Migraines     Migraines          Past Surgical History:   Procedure Laterality Date    CARDIAC VALVE REPLACEMENT  2/2018    Repair    ECTOPIC PREGNANCY SURGERY  2008    EXPLORATORY LAPAROTOMY W/ BOWEL RESECTION  2008    during ectopic pregnancy, bowel was perforated and needed partial resection    MITRAL VALVE REPAIR  2018    NASAL TURBINATE REDUCTION  2007    TUBAL LIGATION         Time Tracking:     OT Date of Treatment: 10/01/24  OT Start Time: 1120  OT Stop Time: 1146  OT Total Time (min): 26 min    Billable Minutes:Evaluation 26    10/1/2024

## 2024-10-01 NOTE — ASSESSMENT & PLAN NOTE
History of MVR 2018 on asa 81 mg . ECHO 2018 with mild left atrial enlargement, concentric remodeling, enlarged ascending aorta, EF 65-70%.     - Pending echo with bubble study

## 2024-10-01 NOTE — H&P
[facility] - Emergency Dept  Vascular Neurology  [facility]  History & Physical    Inpatient consult to Vascular (Stroke) Neurology  Consult performed by: Remigio Bhatia MD  Consult ordered by: Ava Staton MD        Assessment/Plan:     Patient is a 54 y.o. year old female with:    * Central retinal artery occlusion of right eye  Alondra Carrera is a 54 year old female with history of migraine, MVP s/p repair, htn, and hld who presents with painless monocular vision loss of the right eye while watching TV at 10 PM 9/30. Ophthalmology exam consistent with CRAO given pale retina and cherry red spot. Her vision has largely improved, now with generalized blurriness. Recommend completing stroke work up and starting dual antiplatelet therapy.     - Obtain MRI/MRA brain and MRA neck   - Echo with bubble study  - Start aspirin 325 mg x 1 and plavix 300 mg x 1  - Aspirin 81 mg and plavix 75 mg daily thereafter  - Atorvastatin 40 mg  - Daily CBC, CMP, Mg, P  - A1c, TSH, UA, Troponin, PT, PTT x 1  - Heparin dvt ppx  - Maintain SBP<220  - PT/OT evaluation  - Cardiac diet     Hyperlipidemia  - Start Atorvastatin 40 mg    Hypertension  - Maintain SBP<220 in acute stroke setting  - Prn labetalol     Migraine  Patient reports sharp right frontal headache on admission rated at 8/10.     - Her abortive ubrelvy is not on formulary. In the interim will start IV 2g Mg, IV NS, and acetaminophen.    S/P MVR (mitral valve repair)  History of MVR 2018 on asa 81 mg . ECHO 2018 with mild left atrial enlargement, concentric remodeling, enlarged ascending aorta, EF 65-70%.     - Pending echo with bubble study          STROKE DOCUMENTATION          NIH Scale:  1a. Level of Consciousness: 0-->Alert, keenly responsive  1b. LOC Questions: 0-->Answers both questions correctly  1c. LOC Commands: 0-->Performs both tasks correctly  2. Best Gaze: 0-->Normal  3. Visual: 0-->No visual loss  4. Facial Palsy: 0-->Normal  symmetrical movements  5a. Motor Arm, Left: 0-->No drift, limb holds 90 (or 45) degrees for full 10 secs  5b. Motor Arm, Right: 0-->No drift, limb holds 90 (or 45) degrees for full 10 secs  6a. Motor Leg, Left: 0-->No drift, leg holds 30 degree position for full 5 secs  6b. Motor Leg, Right: 0-->No drift, leg holds 30 degree position for full 5 secs  7. Limb Ataxia: 0-->Absent  8. Sensory: 0-->Normal, no sensory loss  9. Best Language: 0-->No aphasia, normal  10. Dysarthria: 0-->Normal  11. Extinction and Inattention (formerly Neglect): 0-->No abnormality  Total (NIH Stroke Scale): 0     Modified Rudy Score: 1  Perlita Coma Scale:    ABCD2 Score:    [id number] Score:   HAS -BLED Score:   ICH Score:   Hunt & Bird Classification:      Thrombolysis Candidate? No, Out of window - Symptom onset > 4.5 hours, Non-disabling symptoms - Low NIHSS     Delays to Thrombolysis?  No    Interventional Revascularization Candidate?   Is the patient eligible for mechanical endovascular reperfusion (VENKATESH)?  No; no significant neurologic deficit (NIHSS <6)     Delays to Thrombectomy? No    Hemorrhagic change of an Ischemic Stroke: Does this patient have an ischemic stroke with hemorrhagic changes? No         Subjective:     History of Present Illness:  Alondra Carrera is 54 year old female with history of migraine (on emgality, topamax, and ubrelvy), MVP s/p repair 2018, htn, and hld who presents with painless right monocular vision loss at 10 PM 9/30 while watching TV. Opthalmology evaluated and her exam is most consistent with CRAO. Her vision has improved since arrival to the ED, first with restored vision temporally, now mainly blurriness throughout her visual field; NIH 0. CT Head is non specific. Recommend obtaining MRI/MRA brain and neck, echo with bubble study, and starting DAPT.           Past Medical History:   Diagnosis Date    Genital herpes, unspecified     Hypertension     Mental disorder     Migraines      Past  Surgical History:   Procedure Laterality Date    CARDIAC VALVE REPLACEMENT  2018    Repair    ECTOPIC PREGNANCY SURGERY      EXPLORATORY LAPAROTOMY W/ BOWEL RESECTION      during ectopic pregnancy, bowel was perforated and needed partial resection    MITRAL VALVE REPAIR      NASAL TURBINATE REDUCTION      TUBAL LIGATION       Social History     Tobacco Use    Smoking status: Former     Current packs/day: 0.00     Average packs/day: 0.3 packs/day for 10.0 years (2.5 ttl pk-yrs)     Types: Cigarettes     Start date:      Quit date: 2/15/2018     Years since quittin.6   Substance Use Topics    Alcohol use: Not Currently    Drug use: No     Review of patient's allergies indicates:   Allergen Reactions    Sulfa (sulfonamide antibiotics) Swelling     Swelling of Eyes and Lips       Medications: I have reviewed the current medication administration record.    (Not in a hospital admission)      Review of Systems   Constitutional:  Negative for chills and fever.   HENT:  Negative for rhinorrhea and trouble swallowing.    Eyes:  Positive for visual disturbance. Negative for discharge.   Respiratory:  Negative for cough and shortness of breath.    Cardiovascular:  Positive for palpitations. Negative for chest pain and leg swelling.   Gastrointestinal:  Negative for abdominal pain and nausea.   Genitourinary:  Negative for dysuria and hematuria.   Musculoskeletal:  Negative for arthralgias and myalgias.   Neurological:  Negative for tremors and headaches.   Psychiatric/Behavioral:  Negative for agitation and confusion.      Objective:     Vital Signs (Most Recent):  Temp: 97.6 °F (36.4 °C) (10/01/24 0445)  Pulse: 65 (10/01/24 0445)  Resp: 14 (10/01/24 0058)  BP: 120/77 (10/01/24 0445)  SpO2: 95 % (10/01/24 0445)    Vital Signs Range (Last 24H):  Temp:  [97.6 °F (36.4 °C)-98.1 °F (36.7 °C)]   Pulse:  [54-65]   Resp:  [14-20]   BP: (120-150)/(77-91)   SpO2:  [95 %-99 %]        Physical  Exam  Constitutional:       Appearance: Normal appearance.   HENT:      Head: Normocephalic and atraumatic.      Nose: Nose normal.      Mouth/Throat:      Pharynx: Oropharynx is clear.   Eyes:      Extraocular Movements: Extraocular movements intact.      Conjunctiva/sclera: Conjunctivae normal.      Comments: Blurriness of vision in right eye, she is able to see my fingers in all 4 quadrants of visual field   Cardiovascular:      Rate and Rhythm: Normal rate and regular rhythm.      Pulses: Normal pulses.   Pulmonary:      Effort: Pulmonary effort is normal.   Abdominal:      General: Abdomen is flat. Bowel sounds are normal.      Palpations: Abdomen is soft.   Musculoskeletal:         General: Normal range of motion.      Cervical back: Normal range of motion.   Neurological:      General: No focal deficit present.      Mental Status: She is alert.   Psychiatric:         Mood and Affect: Mood normal.              Neurological Exam:   LOC: alert  Attention Span: Good   Language: No aphasia  Articulation: No dysarthria  Orientation: Person, Place, Time   Visual Fields: Monocular visual loss: right  EOM (CN III, IV, VI): Full/intact  Facial Movement (CN VII): Symmetric facial expression    Sensation: Intact to light touch, temperature and vibration      Laboratory:  CMP:   Recent Labs   Lab 09/30/24  2307   CALCIUM 9.3   ALBUMIN 3.5   PROT 6.7      K 3.4*   CO2 25      BUN 9   CREATININE 1.0   ALKPHOS 80   ALT 19   AST 25   BILITOT 0.5     CBC:   Recent Labs   Lab 09/30/24  2307   WBC 5.11   RBC 5.08   HGB 13.5   HCT 43.7      MCV 86   MCH 26.6*   MCHC 30.9*       Diagnostic Results:      Brain imaging:  CT H nonspecific, Pending MRI brain    Vessel Imaging:  Pending MRA brain and neck    Cardiac Evaluation:   Pending ECHO        Remigio Bhatia MD  [facility]  Department of Vascular Neurology   [facility] - Emergency Dept

## 2024-10-01 NOTE — PT/OT/SLP PROGRESS
Physical Therapy      Patient Name:  Alondra Carrera   MRN:  [id number]    Patient not seen today. Pt out of room for CT scan. Writing therapist unable to return today. OT performed evaluation and discharge due to patient funcitoning at prior level of function.  Will follow-up per POC.

## 2024-10-01 NOTE — CONSULTS
"Consultation Report  Ophthalmology Service    Date: 10/01/2024    Reason for Consult: "right painless vision loss"     History of Present Illness: Alondra Carrera is a 54 y.o. female with no past ocular hx who [place] [facility] in her right eye. Pt states around 10 pm last night she was watching TV when she lost total vision in her right eye. Pt states everything was black, she could not see light. Pt was seen at outside hospital and was NLP. About 1 hour after vision loss pt states her vision slowly began to return, first in the periphery. Then her central vision came back as well. Pt reports that vision in right eye is more blurry than normal. Denies pain, trauma, jaw pain, recent fevers, fatigue, flu-like symptoms. Denies flashes, floaters, or curtain-veil in visual field ou. Denies any ocular discomfort ou.    POcularHx: Denies history of ocular problems or past ocular surgeries.    Current eye gtts: Denies     Family Hx: Denies family history of glaucoma, macular degeneration, or blindness. family history includes Hearing loss in her father and mother; Hypertension in her mother; Stroke in her mother.     PMHx:  has a past medical history of Genital herpes, unspecified, Hypertension, Mental disorder, and Migraines.     PSurgHx:  has a past surgical history that includes Tubal ligation; Exploratory laparotomy w/ bowel resection (2008); Ectopic pregnancy surgery (2008); Mitral valve repair (2018); Nasal turbinate reduction (2007); and Cardiac valve replacement (2/2018).     Home Medications:   Prior to Admission medications    Medication Sig Start Date End Date Taking? Authorizing Provider   aspirin (ECOTRIN) 325 MG EC tablet Take 325 mg by mouth.   Yes Provider, Historical   omeprazole (PRILOSEC) 40 MG capsule Take 40 mg by mouth every morning. 3/26/24  Yes Provider, Historical   amLODIPine (NORVASC) 5 MG tablet Take 1 tablet (5 mg total) by mouth once daily. 3/21/24   Karson Jackson " MD Tod   atorvastatin (LIPITOR) 20 MG tablet Take 1 tablet (20 mg total) by mouth once daily. 3/21/24 3/21/25  Karson Jackson Jr., MD   galcanezumab-gnlm (EMGALITY PEN) 120 mg/mL PnIj Inject 1 mL (120 mg total) into the skin every 28 days. 8/9/24 2/5/25  Baylee Ellison MD   ibuprofen (ADVIL,MOTRIN) 600 MG tablet PLEASE SEE ATTACHED FOR DETAILED DIRECTIONS 1/3/23   Provider, Historical   ketoconazole (NIZORAL) 2 % cream Apply topically once daily. 12/1/23   Stephania Nation MD   metoprolol tartrate (LOPRESSOR) 50 MG tablet TAKE 1 TABLET BY MOUTH TWICE A DAY 7/26/24   Karson Jackson Jr., MD   nystatin (MYCOSTATIN) powder Apply topically 2 (two) times daily. 12/1/23   Stephania Nation MD   semaglutide, weight loss, (WEGOVY) 0.25 mg/0.5 mL PnIj Inject 0.25 mg into the skin every 7 days. 4/25/24   Jamila Nelson MD   semaglutide, weight loss, (WEGOVY) 0.5 mg/0.5 mL PnIj Inject 0.5 mg into the skin every 7 days. 5/25/24   Jamila Nelson MD   semaglutide, weight loss, (WEGOVY) 1 mg/0.5 mL PnIj Inject 1 mg into the skin every 7 days. 6/25/24   Jamila Nelson MD   topiramate (TOPAMAX) 100 MG tablet Take 1 tablet (100 mg total) by mouth 2 (two) times daily. 4/2/24 7/31/24  Baylee Ellison MD   ubrogepant (UBRELVY) 100 mg tablet Take 1 tablet (100 mg total) by mouth as needed for Migraine. Okay to take 2nd dose in 2 hrs if mild relief, no more than 2 in 24 hrs. 8/9/24   Baylee Ellison MD        Medications this encounter:    aspirin  81 mg Oral Daily    atorvastatin  40 mg Oral Daily    clopidogreL  75 mg Oral Daily    heparin (porcine)  5,000 Units Subcutaneous Q8H         Allergies: is allergic to sulfa (sulfonamide antibiotics).     Social:  reports that she quit smoking about 6 years ago. Her smoking use included cigarettes. She started smoking about 35 years ago. She has a 2.5 pack-year smoking history. She does not have any smokeless tobacco history on file. She reports that she does not currently use  alcohol. She reports that she does not use drugs.     ROS: As per HPI    Ocular examination/Dilated fundus examination:  Base Eye Exam       Visual Acuity (Snellen - Linear)         Right Left    Dist cc 20/50 - 20/20    Dist ph cc 20/20               Tonometry (Applanation, 3:50 AM)         Right Left    Pressure 10 12              Pupils         Dark Light Shape React APD    Right 5 3 Round Brisk RAPD    Left 5 2 Round Brisk None              Visual Fields         Right Left     Full Full              Extraocular Movement         Right Left     Full, Ortho Full, Ortho              Dilation       Both eyes: 1% Mydriacyl, 2.5% Phenylephrine @ 1:52 AM                  Slit Lamp and Fundus Exam       External Exam         Right Left    External Normal Normal              Slit Lamp Exam         Right Left    Lids/Lashes Normal Normal    Conjunctiva/Sclera White and quiet White and quiet    Cornea Clear Clear    Anterior Chamber Deep and quiet Deep and quiet    Iris Round and reactive Round and reactive    Lens Trace Nuclear sclerosis Trace Nuclear sclerosis    Anterior Vitreous Normal Normal              Fundus Exam         Right Left    Disc Pink and sharp, no edema Pink and sharp    C/D Ratio 0.4 0.4    Macula Pallor, sparing just temporal to disc Flat, attached    Vessels Attenuated Normal course and caliber    Periphery Flat, attached, no h/t/d Flat, attached, no h/t/d                      Assessment/Plan:     Transient vision loss, right eye  - Sudden onset decreased vision without other focal neurologic symptoms. Pt reportedly NLP, confirmed at outside hospital. However, pt current vision 20/50-ph20/20 OD  - Concern for CRAO, BRAO due to macular pallor. Also possibly TIA. GCA less likely due to normal ESR/CRP.   - Patient with risk factors including hyperlipidemia, hypertension, mitral valve prolapse  - Fundoscopic exam with retinal whitening, trace RAPD OD  - Recommend neurology consult and consideration of  admission vs outpatient evaluation for cardiac workup  - Consider cardiac workup including EKG, carotid dopplers, echocardiogram, lipid panel, HbA1c, CBC, PT, PTT or other hypercoagulability studies as warranted  - Recommend ESR, CRP, and CBC (for platelet count) to rule-out GCA. Pt does report some right sided TTP. All other GCA ROS negative as above.   - Risk factor control, especially BP  - Patient will need follow-up in retina clinic for serial examination - Will send message to staff to schedule appointment         Patient's Best Contact Number: [phone]     Gee Wilson MD   [facility] Ophthalmology PGY2  10/01/2024  1:59 AM        Common Ophthalmologic Abbreviations  OD: right eye  OS: left eye  OU: both eyes  IOP: intraocular pressure  VA: visual acuity  PH: pinhole  HM: hand motion  LP: light perception  NLP: no light perception  DFE: dilated fundus examination  SLE: slit lamp examination  RD: retinal detachment   AT: artificial tears  PFAT: preservative free artificial tears

## 2024-10-01 NOTE — ED PROVIDER NOTES
Encounter Date: 9/30/2024       History     Chief Complaint   Patient presents with    Loss of Vision     Pt arrived to the ED due to complete right eye vision loss while watching tv tonight. Denies any pain to eye. No dizziness reported     Eye Problem     Pt arrives EMS from [facility] for optho consult for sudden vision loss in the [facility] tonight. Reports HA, no eye pain.     54 y.o. female with history below presents for evaluation of acute painless monocular vision loss.  This occurred suddenly at 2200 [id number].  At the time, the patient was watching TV.  Denies headache, nausea, vomiting, dizziness.  History visual deficit bilaterally and wears glasses but is unsure if she is nearsighted or farsighted.  Non contact lens wear.  No previous intra-ocular surgeries.  No recent ocular trauma.    Triage vitals were reviewed and are: Initial Vitals [09/30/24 2241]  BP: 133/82  Pulse: (!) 59  Resp: 20  Temp: 98.1 °F (36.7 °C)  SpO2: 98 %  MAP: n/a    The Patient:   has a past medical history of Genital herpes, unspecified, Hypertension, Mental disorder, and Migraines.   has a past surgical history that includes Tubal ligation; Exploratory laparotomy w/ bowel resection (2008); Ectopic pregnancy surgery (2008); Mitral valve repair (2018); Nasal turbinate reduction (2007); and Cardiac valve replacement (2/2018).   reports that she quit smoking about 6 years ago. Her smoking use included cigarettes. She started smoking about 35 years ago. She has a 2.5 pack-year smoking history. She does not have any smokeless tobacco history on file. She reports that she does not currently use alcohol. She reports that she does not use drugs.  Has allergies listed as: Sulfa (sulfonamide antibiotics)        The history is provided by the patient. No  was used.     Review of patient's allergies indicates:   Allergen Reactions    Sulfa (sulfonamide antibiotics) Swelling     Swelling of Eyes and Lips     Past  Medical History:   Diagnosis Date    Genital herpes, unspecified     Hypertension     Mental disorder     Migraines      Past Surgical History:   Procedure Laterality Date    CARDIAC VALVE REPLACEMENT  2018    Repair    ECTOPIC PREGNANCY SURGERY      EXPLORATORY LAPAROTOMY W/ BOWEL RESECTION      during ectopic pregnancy, bowel was perforated and needed partial resection    MITRAL VALVE REPAIR      NASAL TURBINATE REDUCTION  2007    TUBAL LIGATION       Family History   Problem Relation Name Age of Onset    Hearing loss Mother Gert     Hypertension Mother Gert     Stroke Mother Gert     Hearing loss Father Brennan     Breast cancer Neg Hx      Colon cancer Neg Hx      Ovarian cancer Neg Hx      COPD Neg Hx      Drug abuse Neg Hx      Hyperlipidemia Neg Hx      Kidney disease Neg Hx       Social History     Tobacco Use    Smoking status: Former     Current packs/day: 0.00     Average packs/day: 0.3 packs/day for 10.0 years (2.5 ttl pk-yrs)     Types: Cigarettes     Start date:      Quit date: 2/15/2018     Years since quittin.6   Substance Use Topics    Alcohol use: Not Currently    Drug use: No     Review of Systems   Constitutional:  Negative for chills, fatigue and fever.   HENT:  Negative for congestion, hearing loss, sore throat and trouble swallowing.    Eyes:  Positive for visual disturbance.   Respiratory:  Negative for cough, chest tightness and shortness of breath.    Cardiovascular:  Negative for chest pain.   Gastrointestinal:  Negative for abdominal pain, constipation, diarrhea, nausea and vomiting.   Endocrine: Negative for cold intolerance and heat intolerance.   Genitourinary:  Negative for difficulty urinating and frequency.   Musculoskeletal:  Negative for arthralgias and myalgias.   Skin:  Negative for rash.   Neurological:  Negative for dizziness and headaches.   Psychiatric/Behavioral:  Negative for suicidal ideas. The patient is not nervous/anxious.        Physical Exam      Initial Vitals [09/30/24 2241]   BP Pulse Resp Temp SpO2   133/82 (!) 59 20 98.1 °F (36.7 °C) 98 %      MAP       --         Physical Exam    Nursing note and vitals reviewed.  Constitutional: She appears well-developed and well-nourished.   HENT:   Head: Normocephalic and atraumatic.   Nose: Nose normal. Mouth/Throat: Oropharynx is clear and moist.   Eyes:   EYES:   LEFT EYE; PERRL, no conjunctival erythema / injection / icterus; No foreign bodies; No periorbital edema or erythema, arcus senilis IOP 8.   RIGHT EYE:  Minimal response to light in the right eye, however appropriate consensual response when light shined in left eye.  Arcus senilis.  Unable to fully visualize the entirety of the retina or macula.  Visualized aspect of the retina demonstrates pallor.  Otherwise no conjunctival erythema/injection/icterus.  IOP 8.    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   Cardiovascular:  Normal rate, regular rhythm, normal heart sounds and intact distal pulses.           Pulmonary/Chest: Breath sounds normal. No respiratory distress.   Musculoskeletal:         General: Normal range of motion.      Cervical back: Neck supple.     Neurological: She is alert and oriented to person, place, and time. GCS score is 15. GCS eye subscore is 4. GCS verbal subscore is 5. GCS motor subscore is 6.   Patient is alert and oriented to person, place, time, and event.   GCS 15: Motor 6, Verbal 5, Eye 4.   No facial droop, dysarthria, or aphasia.    (-) Romberg    CN 2-12 Intact.   -except as above, Patient has no deviation of baseline vision. Normal Confrontation (CN II)  -Extraocular movements are full, physiologic nystagmus is present. Eyes converge equally and pupils constrict with near focus. (CN III, IV, VI)  -Patient able to fully open and close jaw. Equal sensation over bilateral temples, cheeks, and  jawline. (CN V)  -Patient able to raise eyebrows and expand cheeks (CN VII)  -Patient able to lateralize sounds bilaterally (CN VIII)  -Uvula is midline and rises symmetrically with soft palate on phonation, active gag reflex (CN IX, X)  -Patient with equal rise of shoulders and equal strength on resisted rotation of neck b/l. Strength 5/5. (CN XI)  -Patient able to stick out tongue at midline and move side to side, resists against deviation by me (CN XII)    No decreased proprioception sensation to suggest dorsal column injury. Patient able to verbalize up/down of DIP joint. Patient able to identify pen in hand with eyes closed.   No grossly decreased sensation to light touch, pain, or pressure to suggest spinothalamic pathway injury.  No cerebellar signs:  -No dysmetria. Finger-To-Nose b/l with smooth movements and no overshoot.   -No dysdiadochokinesis. Hand rapid alternating movements are quick, smooth, and rhythmic b/l.   -No pronator drift.    Gait is essentially Normal. Ambulates without assistance or assistive device.  No Ataxia.  Able to transfer to and from the exam table without assistance.     Gross Strength testing:   L        R  [5/5]  [5/5]  Upper  [5/5]  [5/5]  Lower  [5/5]  [5/5]       Skin: Skin is warm and dry. Capillary refill takes less than 2 seconds.   Psychiatric: She has a normal mood and affect. Her behavior is normal. Judgment and thought content normal.         ED Course   ED US Eye    Date/Time: 9/30/2024 11:11 PM    Performed by: Moses Devries PA-C  Authorized by: Malick Barboza MD    Indication:  Visual loss  Identified structures:  Right  Retinal contour:  Normal  Vitreous body:  Anechoic  Optic nerve sheath:  Normal  Normal ocular ultrasound:  Right  Charge?:  Yes  Critical Care    Date/Time: 9/30/2024 11:24 PM    Performed by: Moses Devries PA-C  Authorized by: Malick Barboza MD  Total critical care time (exclusive of procedural time) : 0 minutes  Comments: CHITO  total of 35 minutes of critical care time was spent by me in the evaluation and management of this patient. Critical care time was performed independently of other procedures and teaching time and included taking a history; performing a physical exam; review of medical charts and documentation; ordering and interpreting labs, imaging and other tests; discussion of patient presentation with consultants; discussion of goals of care of the patient; reevaluation of the patients condition; determination of the patient disposition; and documentation.  Indication:  Acute painless monocular vision loss with concern for permanent disability        Labs Reviewed   CBC W/ AUTO DIFFERENTIAL - Abnormal       Result Value    WBC 5.11      RBC 5.08      Hemoglobin 13.5      Hematocrit 43.7      MCV 86      MCH 26.6 (*)     MCHC 30.9 (*)     RDW 13.7      Platelets 201      MPV 10.3      Immature Granulocytes 0.2      Gran # (ANC) 2.7      Immature Grans (Abs) 0.01      Lymph # 1.9      Mono # 0.4      Eos # 0.1      Baso # 0.03      nRBC 0      Gran % 52.0      Lymph % 37.6      Mono % 7.6      Eosinophil % 2.0      Basophil % 0.6      Differential Method Automated     COMPREHENSIVE METABOLIC PANEL - Abnormal    Sodium 143      Potassium 3.4 (*)     Chloride 109      CO2 25      Glucose 113 (*)     BUN 9      Creatinine 1.0      Calcium 9.3      Total Protein 6.7      Albumin 3.5      Total Bilirubin 0.5      Alkaline Phosphatase 80      AST 25      ALT 19      eGFR >60      Anion Gap 9     PROTIME-INR    Prothrombin Time 10.8      INR 1.0     SEDIMENTATION RATE   C-REACTIVE PROTEIN          Imaging Results              CT Head Without Contrast (Final result)  Result time 10/01/24 00:10:32      Final result by Dana Bergman MD (10/01/24 00:10:32)                   Impression:      No acute intracranial abnormality detected.      Electronically signed by: Dana Bergman  Date:    10/01/2024  Time:    00:10                Narrative:    EXAMINATION:  CT OF THE HEAD WITHOUT    CLINICAL HISTORY:  Acute vision loss;    TECHNIQUE:  5 mm unenhanced axial images were obtained from the skull base to the vertex.    COMPARISON:  12/20/2022    FINDINGS:  The ventricles, basal cisterns, and cortical sulci are within normal limits for patient's stated age. There is no acute intracranial hemorrhage, territorial infarct or mass effect, or midline shift.  Dense calcifications are seen in bilateral basal ganglia again.  The visualized paranasal sinuses and mastoid air cells are clear.                                       ED US Eye (Preliminary result)  Result time 09/30/24 23:37:44      Preliminary result by Moses Devries PA-C (09/30/24 23:37:44)                   Narrative:    Moses Devries PA-C     9/30/2024 11:37 PM  ED US Eye    Date/Time: 9/30/2024 11:11 PM    Performed by: Moses Devries PA-C  Authorized by: Malick Barboza MD    Indication:  Visual loss  Identified structures:  Right  Retinal contour:  Normal  Vitreous body:  Anechoic  Optic nerve sheath:  Normal  Normal ocular ultrasound:  Right  Charge?:  Yes                                     Medications   acetaminophen tablet 1,000 mg (has no administration in time range)       Medical Decision Making  Encounter Date: 9/30/2024  --------------------------------------------------------------------------  54 y.o. female presents for evaluation of acute monocular painless vision loss.  Hemodynamically stable. Afebrile. Phonating and protecting the airway spontaneously. No clinical evidence for cardiovascular instability or impending airway compromise.  Remainder of physical exam as above.    Additional or Independent Historians available and contributing to the history as above: NONE  Prior medical records, when available, were reviewed. This includes a review of the patients comorbidities, medications, and recent hospital or outpatient notes.   Comorbid Conditions affecting  "evaluation, treatment or discharge planning: NONE IDENTIFIED   Social Determinates of Health identified and considered in the evaluation and treatment of this patient: None Identified or significantly impacting evaluation and treatment    Differential diagnoses includes but is not limited to:   Acute glaucoma, open globe, ocular foreign body, retinal detachment, vitreous hemorrhage, endophthalmitis, traumatic injury, orbital fracture, corneal abrasion/ulcer, retinal vascular occlusion, optic neuritis, periorbital/orbital cellulitis, hyphema, hypopion, iritis, UV keratitis, subconjunctival hemorrhage, conjunctivitis, blepharitis, chalazion/hordeolum, benign vitreous floaters, GCA.  --------------------------------------------------------------------------  All available clinical tests were reviewed by me and documented in the ED course.  Vitals range:   Temp:  [98.1 °F (36.7 °C)] 98.1 °F (36.7 °C)  Pulse:  [59] 59  Resp:  [20] 20  SpO2:  [98 %] 98 %  BP: (133)/(82) 133/82  Estimated body mass index is 30.07 kg/m² as calculated from the following:    Height as of this encounter: 5' 7" (1.702 m).    Weight as of this encounter: 87.1 kg (192 lb).    ED MEDS GIVEN:  Medications - No data to display    Procedures Performed:  Critical care, ocular ultrasound  --------------------------------------------------------------------------  Clinical picture today most consistent with acute monocular vision loss, high concern for CRAO given history and physical exam findings.  I did discussed patient with on-call Ophthalmology at [facility] who agrees to accept ED to ED as transfer.  She does not currently recommend any medication, ocular massage or further intervention at this time.  Lab work including CBC, CMP, PT INR, ESR/CRP and CT head noncontrast pending.  At this time however patient has no acute neurologic deficit.  Doubt central cause of monocular vision loss.  Doubt alternate pathology as listed in the " differential above.    The patient has a diagnosis that requires specialty or subspecialty care not available at this facility.  Given this and after consulting with a receiving facility, the patient will be transferred after stabilization to the receiving facility.  The patient understands the need and nature of the transfer.  All questions have been answered.  DISCLAIMER: This note was prepared with [employer] voice recognition transcription software. Garbled syntax, mangled pronouns, and other bizarre constructions may be attributed to that software system.    Final diagnoses:  [H54.60] Monocular vision loss (Primary)                Amount and/or Complexity of Data Reviewed  Labs: ordered.  Radiology: ordered.    Risk  OTC drugs.  Prescription drug management.               ED Course as of 10/01/24 0136   Mon Sep 30, 2024   2321 BP: 133/82 [AN]   2321 Temp: 98.1 °F (36.7 °C) [AN]   2321 Pulse(!): 59 [AN]   2322 Resp: 20 [AN]   2322 SpO2: 98 % [AN]   2322 I discussed patient case with Dr. Pope.  I discussed the initial presentation, history and physical exam, laboratory and imaging findings.  I discussed my concern for acute painless monocular vision loss.  They recommended transfer to [facility] for ophthalmology evaluation..   [AN]      ED Course User Index  [AN] Moses Devries PA-C                           Clinical Impression:  Final diagnoses:  [H54.60] Monocular vision loss (Primary)          ED Disposition Condition    Transfer to Another Facility Stable                    Moses Devries PA-C  10/02/24 1954

## 2024-10-01 NOTE — ED PROVIDER NOTES
Source of History:  Patient  Chart    Chief complaint:  Loss of Vision (Pt arrived to the ED due to complete right eye vision loss while watching tv tonight. Denies any pain to eye. No dizziness reported ) and Eye Problem (Pt arrives EMS from [facility] for optho consult for sudden vision loss in the R. Eye tonight. Reports HA, no eye pain.)      HPI:  Alondra Carrera is a 54 y.o. female presenting to emergency department as transfer from outside hospital for painless vision loss    Patient presented to outside hospital this evening with complaint of acute painless monocular vision loss that occurred while watching television.  Only the right eye is affected.    On exam, she had no perception of light in the left eye.  IOP was 8.  Bedside ultrasound did not demonstrate retinal detachment.  CT head was obtained, unremarkable.  Patient was transferred here for ophthalmology evaluation.    Here, patient states that she now has recovery as some of her vision, feels like she can see things peripherally but still has vision loss at the center.  She denies eye pain.  Complains of mild headache.  Denies focal numbness or weakness of arms or legs.  No speech changes.  No other complaints    Review of patient's allergies indicates:   Allergen Reactions    Sulfa (sulfonamide antibiotics) Swelling     Swelling of Eyes and Lips       No current facility-administered medications on file prior to encounter.     Current Outpatient Medications on File Prior to Encounter   Medication Sig Dispense Refill    aspirin (ECOTRIN) 325 MG EC tablet Take 325 mg by mouth.      omeprazole (PRILOSEC) 40 MG capsule Take 40 mg by mouth every morning.      amLODIPine (NORVASC) 5 MG tablet Take 1 tablet (5 mg total) by mouth once daily. 90 tablet 2    atorvastatin (LIPITOR) 20 MG tablet Take 1 tablet (20 mg total) by mouth once daily. 90 tablet 3    galcanezumab-gnlm (EMGALITY PEN) 120 mg/mL PnIj Inject 1 mL (120 mg total) into the skin every  28 days. 1 mL 5    ibuprofen (ADVIL,MOTRIN) 600 MG tablet PLEASE SEE ATTACHED FOR DETAILED DIRECTIONS      ketoconazole (NIZORAL) 2 % cream Apply topically once daily. 60 g 0    metoprolol tartrate (LOPRESSOR) 50 MG tablet TAKE 1 TABLET BY MOUTH TWICE A  tablet 2    nystatin (MYCOSTATIN) powder Apply topically 2 (two) times daily. 60 g 0    semaglutide, weight loss, (WEGOVY) 0.25 mg/0.5 mL PnIj Inject 0.25 mg into the skin every 7 days. 2 mL 0    semaglutide, weight loss, (WEGOVY) 0.5 mg/0.5 mL PnIj Inject 0.5 mg into the skin every 7 days. 2 mL 0    semaglutide, weight loss, (WEGOVY) 1 mg/0.5 mL PnIj Inject 1 mg into the skin every 7 days. 2 mL 0    topiramate (TOPAMAX) 100 MG tablet Take 1 tablet (100 mg total) by mouth 2 (two) times daily. 60 tablet 3    ubrogepant (UBRELVY) 100 mg tablet Take 1 tablet (100 mg total) by mouth as needed for Migraine. Okay to take 2nd dose in 2 hrs if mild relief, no more than 2 in 24 hrs. 16 tablet 5       PMH:  As per HPI and below:  Past Medical History:   Diagnosis Date    Genital herpes, unspecified     Hypertension     Mental disorder     Migraines      Past Surgical History:   Procedure Laterality Date    CARDIAC VALVE REPLACEMENT  2018    Repair    ECTOPIC PREGNANCY SURGERY      EXPLORATORY LAPAROTOMY W/ BOWEL RESECTION      during ectopic pregnancy, bowel was perforated and needed partial resection    MITRAL VALVE REPAIR      NASAL TURBINATE REDUCTION  2007    TUBAL LIGATION         Social History     Socioeconomic History    Marital status:    Tobacco Use    Smoking status: Former     Current packs/day: 0.00     Average packs/day: 0.3 packs/day for 10.0 years (2.5 ttl pk-yrs)     Types: Cigarettes     Start date:      Quit date: 2/15/2018     Years since quittin.6   Substance and Sexual Activity    Alcohol use: Not Currently    Drug use: No    Sexual activity: Not Currently     Partners: Male     Birth control/protection:  Post-menopausal     Social Drivers of Health     Financial Resource Strain: Low Risk  (3/18/2024)    Overall Financial Resource Strain (CARDIA)     Difficulty of Paying Living Expenses: Not hard at all   Food Insecurity: No Food Insecurity (3/18/2024)    Hunger Vital Sign     Worried About Running Out of Food in the Last Year: Never true     Ran Out of Food in the Last Year: Never true   Transportation Needs: No Transportation Needs (3/18/2024)    PRAPARE - Transportation     Lack of Transportation (Medical): No     Lack of Transportation (Non-Medical): No   Physical Activity: Unknown (3/18/2024)    Exercise Vital Sign     Days of Exercise per Week: Patient declined   Stress: No Stress Concern Present (3/18/2024)    [employer] - Occupational Stress Questionnaire     Feeling of Stress : Only a little   Housing Stability: High Risk (3/18/2024)    Housing Stability Vital Sign     Unable to Pay for Housing in the Last Year: No     Unstable Housing in the Last Year: Yes       Family History   Problem Relation Name Age of Onset    Hearing loss Mother Gert     Hypertension Mother Gert     Stroke Mother Gert     Hearing loss Father Brennan     Breast cancer Neg Hx      Colon cancer Neg Hx      Ovarian cancer Neg Hx      COPD Neg Hx      Drug abuse Neg Hx      Hyperlipidemia Neg Hx      Kidney disease Neg Hx         Physical Exam:      Vitals:    10/01/24 0445   BP: 120/77   Pulse: 65   Resp:    Temp: 97.6 °F (36.4 °C)     Gen: No acute distress.  Nontoxic.  Well appearing.  Mental Status:  Alert and oriented .  Appropriate, conversant.  Skin: Warm, dry. No rashes seen.  Eyes: PERRL. No conjunctival injection.  Pulm: CTAB. No increased work of breathing.  No significant tachypnea.  No audible stridor or wheezing.  No conversational dyspnea.    CV: Regular rate. Regular rhythm.   Abd: Soft.  Not distended.  Nontender.   MSK: Good range of motion all joints.  No deformities.    Neuro: Awake. Speech  normal. No focal neuro deficit observed.    Laboratory Studies:  Labs Reviewed   CBC W/ AUTO DIFFERENTIAL - Abnormal       Result Value    WBC 5.11      RBC 5.08      Hemoglobin 13.5      Hematocrit 43.7      MCV 86      MCH 26.6 (*)     MCHC 30.9 (*)     RDW 13.7      Platelets 201      MPV 10.3      Immature Granulocytes 0.2      Gran # (ANC) 2.7      Immature Grans (Abs) 0.01      Lymph # 1.9      Mono # 0.4      Eos # 0.1      Baso # 0.03      nRBC 0      Gran % 52.0      Lymph % 37.6      Mono % 7.6      Eosinophil % 2.0      Basophil % 0.6      Differential Method Automated     COMPREHENSIVE METABOLIC PANEL - Abnormal    Sodium 143      Potassium 3.4 (*)     Chloride 109      CO2 25      Glucose 113 (*)     BUN 9      Creatinine 1.0      Calcium 9.3      Total Protein 6.7      Albumin 3.5      Total Bilirubin 0.5      Alkaline Phosphatase 80      AST 25      ALT 19      eGFR >60      Anion Gap 9     LIPID PANEL - Abnormal    Cholesterol 149      Triglycerides 152 (*)     HDL 48      LDL Cholesterol 70.6      HDL/Cholesterol Ratio 32.2      Total Cholesterol/HDL Ratio 3.1      Non-HDL Cholesterol 101     PROTIME-INR    Prothrombin Time 10.8      INR 1.0     SEDIMENTATION RATE   C-REACTIVE PROTEIN   SEDIMENTATION RATE    Sed Rate 12     C-REACTIVE PROTEIN    CRP 0.9     HEMOGLOBIN A1C    Hemoglobin A1C 5.0      Estimated Avg Glucose 97     APTT    aPTT 25.4     URINALYSIS, REFLEX TO URINE CULTURE   COMPREHENSIVE METABOLIC PANEL   MAGNESIUM   PHOSPHORUS   TSH   CBC W/ AUTO DIFFERENTIAL   APTT   PROTIME-INR       EKG (independently interpreted by me):  No STEMI    Chart reviewed.     Imaging Results              CT Head Without Contrast (Final result)  Result time 10/01/24 00:10:32      Final result by Dana Bergman MD (10/01/24 00:10:32)                   Impression:      No acute intracranial abnormality detected.      Electronically signed by: Dana Bergman  Date:    10/01/2024  Time:    00:10                Narrative:    EXAMINATION:  CT OF THE HEAD WITHOUT    CLINICAL HISTORY:  Acute vision loss;    TECHNIQUE:  5 mm unenhanced axial images were obtained from the skull base to the vertex.    COMPARISON:  12/20/2022    FINDINGS:  The ventricles, basal cisterns, and cortical sulci are within normal limits for patient's stated age. There is no acute intracranial hemorrhage, territorial infarct or mass effect, or midline shift.  Dense calcifications are seen in bilateral basal ganglia again.  The visualized paranasal sinuses and mastoid air cells are clear.                                       ED US Eye (Final result)  Result time 10/01/24 01:47:21      Final result by Malick Barboza MD (10/01/24 01:47:21)                   Narrative:    Moses Devries PA-C     9/30/2024 11:37 PM  ED US Eye    Date/Time: 9/30/2024 11:11 PM    Performed by: Moses Devries PA-C  Authorized by: Malick Barboza MD    Indication:  Visual loss  Identified structures:  Right  Retinal contour:  Normal  Vitreous body:  Anechoic  Optic nerve sheath:  Normal  Normal ocular ultrasound:  Right  Charge?:  Yes                      Preliminary result by Moses Devries PA-C (10/01/24 01:36:34)                   Narrative:    Moses Devries PA-C     9/30/2024 11:37 PM  ED US Eye    Date/Time: 9/30/2024 11:11 PM    Performed by: Moses Devries PA-C  Authorized by: Malick Barboza MD    Indication:  Visual loss  Identified structures:  Right  Retinal contour:  Normal  Vitreous body:  Anechoic  Optic nerve sheath:  Normal  Normal ocular ultrasound:  Right  Charge?:  Yes                                    Medications Given:  Medications   sodium chloride 0.9% flush 10 mL (has no administration in time range)   labetalol 20 mg/4 mL (5 mg/mL) IV syring (has no administration in time range)   bisacodyL suppository 10 mg (has no administration in time range)   heparin (porcine) injection 5,000 Units (has no administration in time  range)   polyethylene glycol packet 17 g (has no administration in time range)   senna-docusate 8.6-50 mg per tablet 1 tablet (has no administration in time range)   ondansetron disintegrating tablet 8 mg (has no administration in time range)   atorvastatin tablet 40 mg (has no administration in time range)   aspirin EC tablet 325 mg (has no administration in time range)   aspirin EC tablet 81 mg (has no administration in time range)   clopidogreL tablet 300 mg (has no administration in time range)   clopidogreL tablet 75 mg (has no administration in time range)   acetaminophen tablet 1,000 mg (1,000 mg Oral Given 10/1/24 0209)       Discussed with:  Ophthalmology, vascular Neurology    MDM:    54 y.o. female transferred from outside hospital for painless vision loss of right eye.  Patient seen by Ophthalmology, workup consistent with central retinal artery occlusion.  Per their recommendations, EKG and labs were ordered.  Vascular neurology consulted, and patient admitted to their service in stable condition.      Diagnostic Impression:    1. Central retinal artery occlusion of right eye    2. Monocular vision loss    3. Central retinal artery occlusion    4. CRAO (central retinal artery occlusion)         ED Disposition Condition    Admit                Patient understands the plan and is in agreement, verbalized understanding, questions answered    Ava Staton MD  Emergency Medicine         Ava Staton MD  10/01/24 [id number]

## 2024-10-01 NOTE — SUBJECTIVE & OBJECTIVE
Past Medical History:   Diagnosis Date    Genital herpes, unspecified     Hypertension     Mental disorder     Migraines      Past Surgical History:   Procedure Laterality Date    CARDIAC VALVE REPLACEMENT  2018    Repair    ECTOPIC PREGNANCY SURGERY      EXPLORATORY LAPAROTOMY W/ BOWEL RESECTION      during ectopic pregnancy, bowel was perforated and needed partial resection    MITRAL VALVE REPAIR      NASAL TURBINATE REDUCTION  2007    TUBAL LIGATION       Social History     Tobacco Use    Smoking status: Former     Current packs/day: 0.00     Average packs/day: 0.3 packs/day for 10.0 years (2.5 ttl pk-yrs)     Types: Cigarettes     Start date:      Quit date: 2/15/2018     Years since quittin.6   Substance Use Topics    Alcohol use: Not Currently    Drug use: No     Review of patient's allergies indicates:   Allergen Reactions    Sulfa (sulfonamide antibiotics) Swelling     Swelling of Eyes and Lips       Medications: I have reviewed the current medication administration record.    (Not in a hospital admission)      Review of Systems   Constitutional:  Negative for chills and fever.   HENT:  Negative for rhinorrhea and trouble swallowing.    Eyes:  Positive for visual disturbance. Negative for discharge.   Respiratory:  Negative for cough and shortness of breath.    Cardiovascular:  Positive for palpitations. Negative for chest pain and leg swelling.   Gastrointestinal:  Negative for abdominal pain and nausea.   Genitourinary:  Negative for dysuria and hematuria.   Musculoskeletal:  Negative for arthralgias and myalgias.   Neurological:  Negative for tremors and headaches.   Psychiatric/Behavioral:  Negative for agitation and confusion.      Objective:     Vital Signs (Most Recent):  Temp: 97.6 °F (36.4 °C) (10/01/24 0445)  Pulse: 65 (10/01/24 0445)  Resp: 14 (10/01/24 0058)  BP: 120/77 (10/01/24 0445)  SpO2: 95 % (10/01/24 0445)    Vital Signs Range (Last 24H):  Temp:  [97.6 °F (36.4  °C)-98.1 °F (36.7 °C)]   Pulse:  [54-65]   Resp:  [14-20]   BP: (120-150)/(77-91)   SpO2:  [95 %-99 %]        Physical Exam  Constitutional:       Appearance: Normal appearance.   HENT:      Head: Normocephalic and atraumatic.      Nose: Nose normal.      Mouth/Throat:      Pharynx: Oropharynx is clear.   Eyes:      Extraocular Movements: Extraocular movements intact.      Conjunctiva/sclera: Conjunctivae normal.      Comments: Blurriness of vision in right eye, she is able to see my fingers in all 4 quadrants of visual field   Cardiovascular:      Rate and Rhythm: Normal rate and regular rhythm.      Pulses: Normal pulses.   Pulmonary:      Effort: Pulmonary effort is normal.   Abdominal:      General: Abdomen is flat. Bowel sounds are normal.      Palpations: Abdomen is soft.   Musculoskeletal:         General: Normal range of motion.      Cervical back: Normal range of motion.   Neurological:      General: No focal deficit present.      Mental Status: She is alert.   Psychiatric:         Mood and Affect: Mood normal.              Neurological Exam:   LOC: alert  Attention Span: Good   Language: No aphasia  Articulation: No dysarthria  Orientation: Person, Place, Time   Visual Fields: Monocular visual loss: right  EOM (CN III, IV, VI): Full/intact  Facial Movement (CN VII): Symmetric facial expression    Sensation: Intact to light touch, temperature and vibration      Laboratory:  CMP:   Recent Labs   Lab 09/30/24  2307   CALCIUM 9.3   ALBUMIN 3.5   PROT 6.7      K 3.4*   CO2 25      BUN 9   CREATININE 1.0   ALKPHOS 80   ALT 19   AST 25   BILITOT 0.5     CBC:   Recent Labs   Lab 09/30/24  2307   WBC 5.11   RBC 5.08   HGB 13.5   HCT 43.7      MCV 86   MCH 26.6*   MCHC 30.9*       Diagnostic Results:      Brain imaging:  CT H nonspecific, Pending MRI brain    Vessel Imaging:  Pending MRA brain and neck    Cardiac Evaluation:   Pending ECHO

## 2024-10-01 NOTE — ASSESSMENT & PLAN NOTE
Alondra Carrera is a 54 year old female with history of migraine, MVP s/p repair, htn, and hld who presents with painless monocular vision loss of the right eye while watching TV at 10 PM 9/30. Ophthalmology exam consistent with CRAO given pale retina and cherry red spot. Her vision has largely improved, now with generalized blurriness. Recommend completing stroke work up and starting dual antiplatelet therapy.     - Loaded with aspirin 325 mg x 1 and plavix 300 mg x 1  - Daily CBC, CMP, Mg, P  - A1c5%, TSH WNL, CRP normal  - Cardiac diet     Antithrombotics for secondary stroke prevention: Antiplatelets: Aspirin: 81 mg daily  Clopidogrel: 75 mg daily    Statins for secondary stroke prevention and hyperlipidemia, if present:   Statins: Atorvastatin- 40 mg daily    Aggressive risk factor modification: HTN, Migraine and Hx of MVP repair     Rehab efforts: The patient has been evaluated by a stroke team provider and the therapy needs have been fully considered based off the presenting complaints and exam findings. The following therapy evaluations are needed: PT evaluate and treat, OT evaluate and treat, SLP evaluate and treat    Diagnostics ordered/pending: CT scan of head without contrast to asses brain parenchyma, CTA Head to assess vasculature , CTA Neck/Arch to assess vasculature, HgbA1C to assess blood glucose levels, Lipid Profile to assess cholesterol levels, MRI head without contrast to assess brain parenchyma, TTE to assess cardiac function/status , TSH to assess thyroid function, Other: JAH    VTE prophylaxis: Heparin 5000 units SQ every 8 hours    BP parameters:  Normotension

## 2024-10-01 NOTE — ED TRIAGE NOTES
Alondra Carrera, a 54 y.o. female presents to the ED w/ complaint of sudden right eye vision lost that happened last night around 10pm, pt is also experiencing a 9/10 headache with photosensitivity. Pt is a transfer from [facility] for ophthalmology consult. Pt has hx of migraines.     Triage note:  Chief Complaint   Patient presents with    Loss of Vision     Pt arrived to the ED due to complete right eye vision loss while watching tv tonight. Denies any pain to eye. No dizziness reported     Eye Problem     Pt arrives EMS from [facility] for optho consult for sudden vision loss in the [facility] Eye tonight. Reports HA, no eye pain.     Review of patient's allergies indicates:   Allergen Reactions    Sulfa (sulfonamide antibiotics) Swelling     Swelling of Eyes and Lips     Past Medical History:   Diagnosis Date    Genital herpes, unspecified     Hypertension     Mental disorder     Migraines

## 2024-10-01 NOTE — CARE UPDATE
I have reviewed the chart of Alondra Carrera who is hospitalized for the following:    Active Hospital Problems    Diagnosis    *Central retinal artery occlusion of right eye    Hypertension    Hyperlipidemia    Hypokalemia     POA  K 3.8  Monitor with daily cmp  replace      Hyperparathyroidism     Lab Results   Component Value Date    PTH 88.4 (H) 11/07/2023    PTH 85.4 (H) 04/12/2023     Lab Results   Component Value Date    CALCIUM 9.4 11/07/2023    CALCIUM 9.1 04/12/2023    CALCIUM 9.8 01/24/2018     Lab Results   Component Value Date    [id number] 19 (L) 11/07/2023    [id number] 29 (L) 04/12/2023     Lab Results   Component Value Date    PHOS 3.6 01/24/2018    PHOS 3.5 01/23/2018    PHOS 3.2 01/22/2018         Migraine    S/P MVR (mitral valve repair)        Iraida Fox NP  Unit Based [facility]

## 2024-10-01 NOTE — PLAN OF CARE
[facility] - Neurosurgery ([facility])  Initial Discharge Assessment       Primary Care Provider: Karson Jackson Jr., MD    Admission Diagnosis: Central retinal artery occlusion [H34.10]  CRAO (central retinal artery occlusion) [H34.10]  Central retinal artery occlusion of right eye [H34.11]  Monocular vision loss [H54.60]    Admission Date: 9/30/2024  Expected Discharge Date:     Transition of Care Barriers: (P) None    Payor: BLUE CROSS BLUE SHIELD / Plan: BCBS ALL OUT OF STATE / Product Type: PPO /     Extended Emergency Contact Information  Primary Emergency Contact: DeandreLuis Miguel  Address: [address] [place] [address]           [place] [address] [facility]  Home Phone: [phone]  Mobile Phone: [phone]  Relation: Spouse    Discharge Plan A: (P) Home with family, Home Health  Discharge Plan B: (P) Home with family      [id number] - [place] - [address]  [address]  [place] [address]  Phone: [phone] Fax: [phone]    [id number] - [place] - [address] at the [facility]  [address] [facility] [address]  Phone: [phone] Fax: [phone]    [facility] - [place], [place] - [address], [address]  [address], [address]  [place] [address]  Phone: [phone] Fax: [phone]      Initial Assessment (most recent)       Adult Discharge Assessment - 10/01/24 1450          Discharge Assessment    Assessment Type Discharge Planning Assessment (P)      Confirmed/corrected address, phone number and insurance Yes (P)      Confirmed Demographics Correct on Facesheet (P)      Source of Information patient;family (P)      When was your last doctors appointment? 03/21/24 (P)      Communicated JEYSON with patient/caregiver Date not available/Unable to determine (P)      Reason For Admission central retinal artery occlusion (P)      People in Home spouse;child([place]), adult (P)      Do you expect to return to your current living  situation? Yes (P)      Do you have help at home or someone to help you manage your care at home? Yes (P)      Who are your caregiver(s) and their phone number(s)?  Luis Miguel [phone] (P)      Prior to hospitilization cognitive status: Unable to Assess (P)      Current cognitive status: Alert/Oriented (P)      Walking or Climbing Stairs Difficulty no (P)      Dressing/Bathing Difficulty no (P)      Equipment Currently Used at Home none (P)      Readmission within 30 days? No (P)      Patient currently being followed by outpatient case management? No (P)      Do you currently have service(s) that help you manage your care at home? No (P)      Do you take prescription medications? Yes (P)      Do you have prescription coverage? Yes (P)      Coverage BCBS (P)      Do you have any problems affording any of your prescribed medications? No (P)      Is the patient taking medications as prescribed? yes (P)      Who is going to help you get home at discharge?  Luis Miguel (P)      How do you get to doctors appointments? car, drives self;family or friend will provide (P)      Are you on dialysis? No (P)      Do you take coumadin? No (P)      Discharge Plan A Home with family;Home Health (P)      Discharge Plan B Home with family (P)      DME Needed Upon Discharge  none (P)      Discharge Plan discussed with: Patient;Spouse/sig other (P)      Transition of Care Barriers None (P)         Physical Activity    On average, how many days per week do you engage in moderate to strenuous exercise (like a brisk walk)? 2 days (P)      On average, how many minutes do you engage in exercise at this level? 20 min (P)         Financial Resource Strain    How hard is it for you to pay for the very basics like food, housing, medical care, and heating? Not hard at all (P)         Housing Stability    In the last 12 months, was there a time when you were not able to pay the mortgage or rent on time? No (P)      At any time in the  past 12 months, were you homeless or living in a shelter (including now)? No (P)         Transportation Needs    Has the lack of transportation kept you from medical appointments, meetings, work or from getting things needed for daily living? No (P)         Food Insecurity    Within the past 12 months, you worried that your food would run out before you got the money to buy more. Never true (P)      Within the past 12 months, the food you bought just didn't last and you didn't have money to get more. Never true (P)         Stress    Do you feel stress - tense, restless, nervous, or anxious, or unable to sleep at night because your mind is troubled all the time - these days? To some extent (P)         Social Isolation    How often do you feel lonely or isolated from those around you?  Rarely (P)         Alcohol Use    Q1: How often do you have a drink containing alcohol? Monthly or less (P)      Q2: How many drinks containing alcohol do you have on a typical day when you are drinking? 1 or 2 (P)      Q3: How often do you have six or more drinks on one occasion? Never (P)         Kumo    In the past 12 months has the electric, gas, oil, or water company threatened to shut off services in your home? No (P)         Health Literacy    How often do you need to have someone help you when you read instructions, pamphlets, or other written material from your doctor or pharmacy? Rarely (P)         OTHER    Name(s) of People in Home Spouse Luis Miguel, 2 adult children (P)                    SW met with patient and her spouse at bedside.  They reside, along with their 2 adult children, in a 2 story home.  Patient independent with ADL's and ambulation and has no DME or HH needs.  Patient is not on HD or Coumadin.  Patient will discharge home with spouse once medically cleared.  No anticpated post acute needs.      Discharge Plan A and Plan B have been determined by review of patient's clinical status, future medical and  therapeutic needs, and coverage/benefits for post-acute care in coordination with multidisciplinary team members.    Mariam Sin LMSW  [facility]  [phone]

## 2024-10-02 LAB
ALBUMIN SERPL BCP-MCNC: 3.4 G/DL (ref 3.5–5.2)
ALP SERPL-CCNC: 75 U/L (ref 55–135)
ALT SERPL W/O P-5'-P-CCNC: 20 U/L (ref 10–44)
ANION GAP SERPL CALC-SCNC: 8 MMOL/L (ref 8–16)
AST SERPL-CCNC: 27 U/L (ref 10–40)
BASOPHILS # BLD AUTO: 0.02 K/UL (ref 0–0.2)
BASOPHILS NFR BLD: 0.5 % (ref 0–1.9)
BILIRUB SERPL-MCNC: 0.7 MG/DL (ref 0.1–1)
BUN SERPL-MCNC: 9 MG/DL (ref 6–20)
CALCIUM SERPL-MCNC: 9.6 MG/DL (ref 8.7–10.5)
CHLORIDE SERPL-SCNC: 109 MMOL/L (ref 95–110)
CO2 SERPL-SCNC: 23 MMOL/L (ref 23–29)
CREAT SERPL-MCNC: 0.9 MG/DL (ref 0.5–1.4)
DIFFERENTIAL METHOD BLD: ABNORMAL
EOSINOPHIL # BLD AUTO: 0.1 K/UL (ref 0–0.5)
EOSINOPHIL NFR BLD: 2.7 % (ref 0–8)
ERYTHROCYTE [DISTWIDTH] IN BLOOD BY AUTOMATED COUNT: 13.8 % (ref 11.5–14.5)
EST. GFR  (NO RACE VARIABLE): >60 ML/MIN/1.73 M^2
GLUCOSE SERPL-MCNC: 83 MG/DL (ref 70–110)
HCT VFR BLD AUTO: 43.8 % (ref 37–48.5)
HGB BLD-MCNC: 13.5 G/DL (ref 12–16)
IMM GRANULOCYTES # BLD AUTO: 0.01 K/UL (ref 0–0.04)
IMM GRANULOCYTES NFR BLD AUTO: 0.3 % (ref 0–0.5)
LYMPHOCYTES # BLD AUTO: 1.2 K/UL (ref 1–4.8)
LYMPHOCYTES NFR BLD: 31.5 % (ref 18–48)
MAGNESIUM SERPL-MCNC: 2.3 MG/DL (ref 1.6–2.6)
MCH RBC QN AUTO: 26.1 PG (ref 27–31)
MCHC RBC AUTO-ENTMCNC: 30.8 G/DL (ref 32–36)
MCV RBC AUTO: 85 FL (ref 82–98)
MONOCYTES # BLD AUTO: 0.3 K/UL (ref 0.3–1)
MONOCYTES NFR BLD: 7.1 % (ref 4–15)
NEUTROPHILS # BLD AUTO: 2.1 K/UL (ref 1.8–7.7)
NEUTROPHILS NFR BLD: 57.9 % (ref 38–73)
NRBC BLD-RTO: 0 /100 WBC
PHOSPHATE SERPL-MCNC: 3.2 MG/DL (ref 2.7–4.5)
PLATELET # BLD AUTO: 192 K/UL (ref 150–450)
PMV BLD AUTO: 10.7 FL (ref 9.2–12.9)
POTASSIUM SERPL-SCNC: 3.9 MMOL/L (ref 3.5–5.1)
PROT SERPL-MCNC: 6.5 G/DL (ref 6–8.4)
RBC # BLD AUTO: 5.18 M/UL (ref 4–5.4)
SODIUM SERPL-SCNC: 140 MMOL/L (ref 136–145)
TROPONIN I SERPL DL<=0.01 NG/ML-MCNC: <0.006 NG/ML (ref 0–0.03)
WBC # BLD AUTO: 3.65 K/UL (ref 3.9–12.7)

## 2024-10-02 PROCEDURE — 11000001 HC ACUTE MED/SURG PRIVATE ROOM

## 2024-10-02 PROCEDURE — 85025 COMPLETE CBC W/AUTO DIFF WBC: CPT

## 2024-10-02 PROCEDURE — 63600175 PHARM REV CODE 636 W HCPCS: Performed by: PSYCHIATRY & NEUROLOGY

## 2024-10-02 PROCEDURE — 84100 ASSAY OF PHOSPHORUS: CPT

## 2024-10-02 PROCEDURE — 97161 PT EVAL LOW COMPLEX 20 MIN: CPT

## 2024-10-02 PROCEDURE — 25000003 PHARM REV CODE 250

## 2024-10-02 PROCEDURE — 63600175 PHARM REV CODE 636 W HCPCS

## 2024-10-02 PROCEDURE — 83735 ASSAY OF MAGNESIUM: CPT

## 2024-10-02 PROCEDURE — 36415 COLL VENOUS BLD VENIPUNCTURE: CPT

## 2024-10-02 PROCEDURE — 84484 ASSAY OF TROPONIN QUANT: CPT

## 2024-10-02 PROCEDURE — 80053 COMPREHEN METABOLIC PANEL: CPT

## 2024-10-02 PROCEDURE — 99233 SBSQ HOSP IP/OBS HIGH 50: CPT | Mod: ,,, | Performed by: PSYCHIATRY & NEUROLOGY

## 2024-10-02 RX ORDER — MAGNESIUM SULFATE HEPTAHYDRATE 40 MG/ML
2 INJECTION, SOLUTION INTRAVENOUS ONCE
Status: DISCONTINUED | OUTPATIENT
Start: 2024-10-02 | End: 2024-10-02

## 2024-10-02 RX ORDER — MAGNESIUM SULFATE HEPTAHYDRATE 40 MG/ML
2 INJECTION, SOLUTION INTRAVENOUS ONCE
Status: COMPLETED | OUTPATIENT
Start: 2024-10-02 | End: 2024-10-02

## 2024-10-02 RX ADMIN — HEPARIN SODIUM 5000 UNITS: 5000 INJECTION INTRAVENOUS; SUBCUTANEOUS at 02:10

## 2024-10-02 RX ADMIN — ACETAMINOPHEN 650 MG: 325 TABLET ORAL at 09:10

## 2024-10-02 RX ADMIN — ASPIRIN 81 MG: 81 TABLET, COATED ORAL at 09:10

## 2024-10-02 RX ADMIN — CLOPIDOGREL BISULFATE 75 MG: 75 TABLET ORAL at 09:10

## 2024-10-02 RX ADMIN — ATORVASTATIN CALCIUM 40 MG: 40 TABLET, FILM COATED ORAL at 09:10

## 2024-10-02 RX ADMIN — MAGNESIUM SULFATE HEPTAHYDRATE 2 G: 40 INJECTION, SOLUTION INTRAVENOUS at 03:10

## 2024-10-02 RX ADMIN — HEPARIN SODIUM 5000 UNITS: 5000 INJECTION INTRAVENOUS; SUBCUTANEOUS at 05:10

## 2024-10-02 RX ADMIN — HEPARIN SODIUM 5000 UNITS: 5000 INJECTION INTRAVENOUS; SUBCUTANEOUS at 09:10

## 2024-10-02 NOTE — PLAN OF CARE
Problem: Adult Inpatient Plan of Care  Goal: Plan of Care Review  Outcome: Progressing     Problem: Stroke, Ischemic (Includes Transient Ischemic Attack)  Goal: Optimal Coping  Outcome: Progressing  Goal: Optimal Cognitive Function  Outcome: Progressing   POC reviewed and updated with the patient. Questions regarding POC were encouraged and addressed with the patient.  VSS, see flow-sheets. Tele maintained per order. NAEON. Patient is AO X 4 at this time.Fall/safety precautions maintained, no signs of injury noted during shift.. Upon exiting room, patient's bed locked in low position, side rails up x 3, bed alarm set, with call light within reach. Instructed patient to call staff for assistance, verbalized understanding.  No acute signs of distress noted at this time.

## 2024-10-02 NOTE — ASSESSMENT & PLAN NOTE
History of MVR 2018 on asa 81 mg . ECHO 2018 with mild left atrial enlargement, concentric remodeling, enlarged ascending aorta, EF 65-70%.     - TTE with bubble study EF 60 - 65%, no MR or thrombus.

## 2024-10-02 NOTE — ASSESSMENT & PLAN NOTE
History of MVR 2018 on asa 81 mg . ECHO 2018 with mild left atrial enlargement, concentric remodeling, enlarged ascending aorta, EF 65-70%.     - TTE with bubble study EF 60 - 65%, no MR or thrombus.  - Cardiology consulted for JAH for better visualization of the repaired MVP.

## 2024-10-02 NOTE — SUBJECTIVE & OBJECTIVE
Neurologic Chief Complaint: sudden R monocular painless vision loss    Subjective:     Interval History: Patient is seen for follow-up neurological assessment and treatment recommendations: Look at hospital course.    HPI, Past Medical, Family, and Social History remains the same as documented in the initial encounter.     Review of Systems   Constitutional:  Negative for fatigue, fever and unexpected weight change.   HENT:  Negative for tinnitus and trouble swallowing.    Eyes:  Positive for visual disturbance. Negative for pain.   Cardiovascular:  Negative for chest pain.   Musculoskeletal:  Negative for arthralgias, joint swelling and neck stiffness.   Neurological:  Positive for headaches. Negative for weakness and numbness.     Scheduled Meds:   aspirin  81 mg Oral Daily    atorvastatin  40 mg Oral Daily    clopidogreL  75 mg Oral Daily    heparin (porcine)  5,000 Units Subcutaneous Q8H    magnesium sulfate IVPB  2 g Intravenous Once     Continuous Infusions:  PRN Meds:  Current Facility-Administered Medications:     acetaminophen, 650 mg, Oral, Q6H PRN    bisacodyL, 10 mg, Rectal, Daily PRN    labetalol, 10 mg, Intravenous, Q6H PRN    ondansetron, 8 mg, Oral, Q8H PRN    polyethylene glycol, 17 g, Oral, BID PRN    senna-docusate 8.6-50 mg, 1 tablet, Oral, BID PRN    sodium chloride 0.9%, 10 mL, Intravenous, PRN    Objective:     Vital Signs (Most Recent):  Temp: 98.2 °F (36.8 °C) (10/02/24 1207)  Pulse: (!) 58 (10/02/24 1207)  Resp: 18 (10/02/24 1207)  BP: (!) 140/78 (10/02/24 1207)  SpO2: 97 % (10/02/24 1207)  BP Location: Right arm    Vital Signs Range (Last 24H):  Temp:  [98 °F (36.7 °C)-98.2 °F (36.8 °C)]   Pulse:  [58-79]   Resp:  [18]   BP: (109-144)/(70-88)   SpO2:  [94 %-98 %]   BP Location: Right arm       Physical Exam  Constitutional:       Appearance: She is not ill-appearing.   Eyes:      Extraocular Movements: Extraocular movements intact.      Right eye: Normal extraocular motion and no  nystagmus.      Left eye: Normal extraocular motion and no nystagmus.      Conjunctiva/sclera: Conjunctivae normal.      Comments: R APD resolved compared to yesterday  Visual acuity:  R 20/200, red desaturation   L 20/40   Neurological:      General: No focal deficit present.      Mental Status: She is alert and oriented to person, place, and time.      Cranial Nerves: No cranial nerve deficit.      Sensory: No sensory deficit.      Motor: No weakness.      Coordination: Coordination normal.              Neurological Exam:   LOC: alert    Laboratory:  CMP:   Recent Labs   Lab 10/02/24  0948   CALCIUM 9.6   ALBUMIN 3.4*   PROT 6.5      K 3.9   CO2 23      BUN 9   CREATININE 0.9   ALKPHOS 75   ALT 20   AST 27   BILITOT 0.7     CBC:   Recent Labs   Lab 10/02/24  0948   WBC 3.65*   RBC 5.18   HGB 13.5   HCT 43.8      MCV 85   MCH 26.1*   MCHC 30.8*     Lipid Panel:   Recent Labs   Lab 10/01/24  0308   CHOL 149   LDLCALC 70.6   HDL 48   TRIG 152*     Coagulation:   Recent Labs   Lab 10/01/24  0521   INR 1.0   APTT 26.6     Hgb A1C:   Recent Labs   Lab 10/01/24  0308   HGBA1C 5.0     TSH:   Recent Labs   Lab 10/01/24  0521   TSH 1.253       Diagnostic Results     Brain Imaging   MRI brain 10/1/2024:   Few punctate foci of subcortical and periventricular FLAIR signal hyperintensity, overall nonspecific, but may relate to sequela of mild microvascular ischemic change.  Punctate foci susceptibility artifact left frontal lobe and cerebellar hemispheres, likely representing sequela of remote microhemorrhage.  No mass lesion, acute hemorrhage, edema or acute infarct.  Minimal 3 mm cerebellar tonsillar ectopia.  Impression:  No acute intracranial pathology.  Few scattered punctate foci subcortical and periventricular FLAIR signal hyperintensity, overall nonspecific, may relate to sequela of mild microvascular ischemic change.  Few punctate foci susceptibility artifact about the left frontal lobe and  cerebellar hemispheres, likely representing sequela of remote microhemorrhage.    Vessel Imaging   CTA Head & Neck 10/1/2024:   The aortic arch demonstratescommon origin of the right subclavian artery and left common carotid artery.  No significant stenosis at the major branch vessel origins.  The right common carotid artery is normal in caliber. No significant stenosis at the carotid bifurcation.The right internal carotid artery is normal in caliber.  Less than 50% stenosis per NASCET criteria.  The left common carotid artery is normal in caliber. No significant stenosis at the carotid bifurcation.The left internal carotid artery is normal in caliber.  Less than 50% stenosis per NASCET criteria.  The right vertebral artery is normal in caliber.  The left vertebral artery is normal in caliber.  Basilar artery is within normal limits without focal abnormality.  The proximal anterior, middle, and posterior cerebral arteries are within normal limits.  No evidence of significant stenosis, focal occlusion, or intracranial aneurysm.  There is a 3 mm outpouching at the origin of the right posterior communicating artery (series 5, image 350).  The bilateral ophthalmic arteries are patent at least to the level of the orbital apex.  Impression:  No acute intracranial pathology.  No intracranial large vessel occlusion or significant stenosis.  Small outpouching at the origin of the right posterior communicating artery, possibly relating to an infundibulum, noting that a small saccular aneurysm cannot be excluded.  Correlation is advised.    Cardiac Imaging   TTE w bubble 10/1/2024:     Left Ventricle: The left ventricle is normal in size. Normal wall thickness. Septal motion is consistent with post-operative status. There is normal systolic function with a visually estimated ejection fraction of 60 - 65%.    Right Ventricle: Normal right ventricular cavity size. Wall thickness is normal. Systolic function is normal.    Mitral  Valve: The mitral valve is repaired by 29 Medtronic Simulus annuloplasty ring.  No MR, No evidence of LVOTO.    Tricuspid Valve: There is mild regurgitation.    Pulmonic Valve: There is mild regurgitation.    Pulmonary Artery: The estimated pulmonary artery systolic pressure is 21 mmHg.    IVC/SVC: Normal venous pressure at 3 mmHg.      Mitral valve repair with quadrangular resection of the P2   scallop of the posterior leaflet, sliding plasty and a 29 mm Medtronic Simulus   band annuloplasty.

## 2024-10-02 NOTE — PT/OT/SLP EVAL
Physical Therapy Evaluation & Discharge    Patient Name:  Alondra Carrera   MRN:  [id number]    Recommendations:     Discharge Recommendations: No Therapy Indicated   Discharge Equipment Recommendations: none   Barriers to discharge: None    Assessment:     Alondra Carrera is a 54 y.o. female admitted with a medical diagnosis of Central retinal artery occlusion of right eye. At this time, patient does not require skilled physical therapy services acutely. Therefore, PT orders to be discontinued and patient to mobilize with staff assistance in accordance with progressive mobility protocol.    Rehab Prognosis: Good; patient would benefit from acute skilled PT services to address these deficits and reach maximum level of function.    Recent Surgery: * No surgery found *      Plan:     During this hospitalization, patient does not require further skilled physical therapy services acutely. PT to be re-consulted if change in functional status noted.      Subjective     Chief Complaint: None stated  Patient/Family Comments/goals: Patient wanting to go home  Pain/Comfort:  Pain Rating 1: 4/10  Location - Orientation 1: generalized  Location 1: head  Pain Addressed 1: Distraction  Pain Rating Post-Intervention 1: Intensity not provided    Patients cultural, spiritual, Synagogue conflicts given the current situation: no    Social History:  Residence: Patient lives with their Spouse & adult children  in a 2 story house. Bed/bath on 2nd level, with 1 flight to access that floor + BHR  Equipment Owned: none  Prior level of function:  Prior to admission, patient was independent for ambulation and all other mobility.  Assistance Upon Discharge:  Familu    Objective:     Communicated with Nsg prior to session.  Patient found HOB elevated with telemetry  upon PT entry to room.    General Precautions: Standard, vision impaired, fall   Orthopedic Precautions:N/A   Braces: N/A   Body mass index is 33.99 kg/m².  Oxygen Device: Room  "Air  Vitals: BP (!) 143/79 (BP Location: Right arm, Patient Position: Lying)   Pulse 79   Temp 98 °F (36.7 °C) (Oral)   Resp 18   Ht 5' 7" (1.702 m)   Wt 98.4 kg (217 lb)   SpO2 98%   Breastfeeding No   BMI 33.99 kg/m²     Exams:  Cognition:   Alert and Cooperative   Patient is oriented to Person, Place, Time, Situation  Command following: Follows multistep verbal commands  Fluency: clear/fluent  Postural Assessment: rounded shoulders and forward head  Physical Exam:    Left LE Right LE   Sensation Not formally tested; patient denied any sensory impairment. Not formally tested; patient denied any sensory impairment.   Coordination normal normal     LLE ROM: WFL  RLE ROM: WFL    LLE Strength: 5/5 globally  RLE Strength: 5/5 globally    Functional Mobility:  Bed Mobility:     EOB Scooting:   Anterior: Independent  Supine>Sit: x1, Independent with HOB Elevated  Sit>Supine: x1, Independent with HOB Elevated    Transfers:     Sit<>Stand: x1, Supervision from Edge of Bed with No AD    Gait:  x200ft, Supervision with No AD  Gait Assessment: No overt deviations observed  Total Distance: 200ft    Balance:   Static Sitting: Independent  Dynamic Sitting: Independent    Static Standing: Supervision  Dynamic Standing: Supervision    Stairs:   Pt ascended/descended  10 stair(s) with Stand-By Assistance and unilateral handrail using No AD  Patient Negotiated Stairs with Reciprocal Pattern    AM-PAC 6 CLICK MOBILITY  Total Score:24     Treatment & Education:  Patient Education Provided on:  The role of physical therapy and how the patient can benefit from skilled services  The negative effects of prolonged bed rest/sedentary behavior, along with the importance of OOB activity & patient participation with PT  The importance of contacting RN, via call light, for mobility throughout the day  Pt white board updated with current therapists name and level of mobility assistance needed.     Patient Verbalized understanding of all " topics touched on this date. All patient questions answered within the PT scope of practice    Patient left HOB elevated with all lines intact, call button in reach, and MD notified.    GOALS: Not established      History:     Past Medical History:   Diagnosis Date    Genital herpes, unspecified     Hypertension     Mental disorder     Migraines     Migraines        Past Surgical History:   Procedure Laterality Date    CARDIAC VALVE REPLACEMENT  2/2018    Repair    ECTOPIC PREGNANCY SURGERY  2008    EXPLORATORY LAPAROTOMY W/ BOWEL RESECTION  2008    during ectopic pregnancy, bowel was perforated and needed partial resection    MITRAL VALVE REPAIR  2018    NASAL TURBINATE REDUCTION  2007    TUBAL LIGATION         Time Tracking:     PT Received On: 10/02/24  PT Start Time: 1128     PT Stop Time: 1140  PT Total Time (min): 12 min     Billable Minutes: Evaluation 12    10/02/2024

## 2024-10-02 NOTE — HOSPITAL COURSE
10/2/2024: No acute events overnight, the patient's vision is getting better, she no longer has the black spot in the middle of her vision. She is still complaining of migraine headaches, will order IV Mg. Pending possible JAH.  10/3/2024 Neuro exam stable. NAEON. No need for JAH at this time. Patient denies HA this am. Plan to discharge the patient today with referral to Ophthalmology.

## 2024-10-02 NOTE — PROGRESS NOTES
[facility] - Neurosurgery ([facility])  Vascular Neurology  Comprehensive Stroke Center  Progress Note    Assessment/Plan:     * Central retinal artery occlusion of right eye  Alondra Carrera is a 54 year old female with history of migraine, MVP s/p repair, htn, and hld who presents with painless monocular vision loss of the right eye while watching TV at 10 PM 9/30. Ophthalmology exam consistent with CRAO given pale retina and cherry red spot. Her vision has largely improved, now with generalized blurriness. Recommend completing stroke work up and starting dual antiplatelet therapy.     - Loaded with aspirin 325 mg x 1 and plavix 300 mg x 1  - Daily CBC, CMP, Mg, P  - A1c5%, TSH WNL, CRP normal  - Cardiac diet     Antithrombotics for secondary stroke prevention: Antiplatelets: Aspirin: 81 mg daily  Clopidogrel: 75 mg daily    Statins for secondary stroke prevention and hyperlipidemia, if present:   Statins: Atorvastatin- 40 mg daily    Aggressive risk factor modification: HTN, Migraine and Hx of MVP repair     Rehab efforts: The patient has been evaluated by a stroke team provider and the therapy needs have been fully considered based off the presenting complaints and exam findings. The following therapy evaluations are needed: PT evaluate and treat, OT evaluate and treat, SLP evaluate and treat    Diagnostics ordered/pending: CT scan of head without contrast to asses brain parenchyma, CTA Head to assess vasculature , CTA Neck/Arch to assess vasculature, HgbA1C to assess blood glucose levels, Lipid Profile to assess cholesterol levels, MRI head without contrast to assess brain parenchyma, TTE to assess cardiac function/status , TSH to assess thyroid function, Other: JAH    VTE prophylaxis: Heparin 5000 units SQ every 8 hours    BP parameters:  Normotension      Hyperlipidemia  - Continue Atorvastatin 40 mg    Hypertension  - Maintain normotension  - Prn labetalol     Migraine  Patient suffers with daily migraine  headaches; received IV Mg in the ED that helped    - Her abortive ubrelvy is not on formulary, will give another dose of IV Mg.    S/P MVR (mitral valve repair)  History of MVR 2018 on asa 81 mg . ECHO 2018 with mild left atrial enlargement, concentric remodeling, enlarged ascending aorta, EF 65-70%.     - TTE with bubble study EF 60 - 65%, no MR or thrombus.  - Cardiology consulted for JAH for better visualization of the repaired MVP.           10/2/2024: No acute events overnight, the patient's vision is getting better, she no longer has the black spot in the middle of her vision. She is still complaining of migraine headaches, will order IV Mg. Pending possible JAH.    STROKE DOCUMENTATION        NIH Scale:  1a. Level of Consciousness: 0-->Alert, keenly responsive  1b. LOC Questions: 0-->Answers both questions correctly  1c. LOC Commands: 0-->Performs both tasks correctly  2. Best Gaze: 0-->Normal  3. Visual: 0-->No visual loss  4. Facial Palsy: 0-->Normal symmetrical movements  5a. Motor Arm, Left: 0-->No drift, limb holds 90 (or 45) degrees for full 10 secs  5b. Motor Arm, Right: 0-->No drift, limb holds 90 (or 45) degrees for full 10 secs  6a. Motor Leg, Left: 0-->No drift, leg holds 30 degree position for full 5 secs  6b. Motor Leg, Right: 0-->No drift, leg holds 30 degree position for full 5 secs  7. Limb Ataxia: 0-->Absent  8. Sensory: 0-->Normal, no sensory loss  9. Best Language: 0-->No aphasia, normal  10. Dysarthria: 0-->Normal  11. Extinction and Inattention (formerly Neglect): 0-->No abnormality  Total (NIH Stroke Scale): 0       Modified Charlton Score: 1  Perlita Coma Scale:    ABCD2 Score:    [id number] Score:   HAS -BLED Score:   ICH Score:   Hunt & Bird Classification:      Hemorrhagic change of an Ischemic Stroke: Does this patient have an ischemic stroke with hemorrhagic changes? No     Neurologic Chief Complaint: sudden R monocular painless vision loss    Subjective:     Interval History:  Patient is seen for follow-up neurological assessment and treatment recommendations: Look at hospital course.    HPI, Past Medical, Family, and Social History remains the same as documented in the initial encounter.     Review of Systems   Constitutional:  Negative for fatigue, fever and unexpected weight change.   HENT:  Negative for tinnitus and trouble swallowing.    Eyes:  Positive for visual disturbance. Negative for pain.   Cardiovascular:  Negative for chest pain.   Musculoskeletal:  Negative for arthralgias, joint swelling and neck stiffness.   Neurological:  Positive for headaches. Negative for weakness and numbness.     Scheduled Meds:   aspirin  81 mg Oral Daily    atorvastatin  40 mg Oral Daily    clopidogreL  75 mg Oral Daily    heparin (porcine)  5,000 Units Subcutaneous Q8H    magnesium sulfate IVPB  2 g Intravenous Once     Continuous Infusions:  PRN Meds:  Current Facility-Administered Medications:     acetaminophen, 650 mg, Oral, Q6H PRN    bisacodyL, 10 mg, Rectal, Daily PRN    labetalol, 10 mg, Intravenous, Q6H PRN    ondansetron, 8 mg, Oral, Q8H PRN    polyethylene glycol, 17 g, Oral, BID PRN    senna-docusate 8.6-50 mg, 1 tablet, Oral, BID PRN    sodium chloride 0.9%, 10 mL, Intravenous, PRN    Objective:     Vital Signs (Most Recent):  Temp: 98.2 °F (36.8 °C) (10/02/24 1207)  Pulse: (!) 58 (10/02/24 1207)  Resp: 18 (10/02/24 1207)  BP: (!) 140/78 (10/02/24 1207)  SpO2: 97 % (10/02/24 1207)  BP Location: Right arm    Vital Signs Range (Last 24H):  Temp:  [98 °F (36.7 °C)-98.2 °F (36.8 °C)]   Pulse:  [58-79]   Resp:  [18]   BP: (109-144)/(70-88)   SpO2:  [94 %-98 %]   BP Location: Right arm       Physical Exam  Constitutional:       Appearance: She is not ill-appearing.   Eyes:      Extraocular Movements: Extraocular movements intact.      Right eye: Normal extraocular motion and no nystagmus.      Left eye: Normal extraocular motion and no nystagmus.      Conjunctiva/sclera: Conjunctivae  normal.      Comments: R APD resolved compared to yesterday  Visual acuity:  R 20/200, red desaturation   L 20/40   Neurological:      General: No focal deficit present.      Mental Status: She is alert and oriented to person, place, and time.      Cranial Nerves: No cranial nerve deficit.      Sensory: No sensory deficit.      Motor: No weakness.      Coordination: Coordination normal.              Neurological Exam:   LOC: alert    Laboratory:  CMP:   Recent Labs   Lab 10/02/24  0948   CALCIUM 9.6   ALBUMIN 3.4*   PROT 6.5      K 3.9   CO2 23      BUN 9   CREATININE 0.9   ALKPHOS 75   ALT 20   AST 27   BILITOT 0.7     CBC:   Recent Labs   Lab 10/02/24  0948   WBC 3.65*   RBC 5.18   HGB 13.5   HCT 43.8      MCV 85   MCH 26.1*   MCHC 30.8*     Lipid Panel:   Recent Labs   Lab 10/01/24  0308   CHOL 149   LDLCALC 70.6   HDL 48   TRIG 152*     Coagulation:   Recent Labs   Lab 10/01/24  0521   INR 1.0   APTT 26.6     Hgb A1C:   Recent Labs   Lab 10/01/24  0308   HGBA1C 5.0     TSH:   Recent Labs   Lab 10/01/24  0521   TSH 1.253       Diagnostic Results     Brain Imaging   MRI brain 10/1/2024:   Few punctate foci of subcortical and periventricular FLAIR signal hyperintensity, overall nonspecific, but may relate to sequela of mild microvascular ischemic change.  Punctate foci susceptibility artifact left frontal lobe and cerebellar hemispheres, likely representing sequela of remote microhemorrhage.  No mass lesion, acute hemorrhage, edema or acute infarct.  Minimal 3 mm cerebellar tonsillar ectopia.  Impression:  No acute intracranial pathology.  Few scattered punctate foci subcortical and periventricular FLAIR signal hyperintensity, overall nonspecific, may relate to sequela of mild microvascular ischemic change.  Few punctate foci susceptibility artifact about the left frontal lobe and cerebellar hemispheres, likely representing sequela of remote microhemorrhage.    Vessel Imaging   CTA Head & Neck  10/1/2024:   The aortic arch demonstratescommon origin of the right subclavian artery and left common carotid artery.  No significant stenosis at the major branch vessel origins.  The right common carotid artery is normal in caliber. No significant stenosis at the carotid bifurcation.The right internal carotid artery is normal in caliber.  Less than 50% stenosis per NASCET criteria.  The left common carotid artery is normal in caliber. No significant stenosis at the carotid bifurcation.The left internal carotid artery is normal in caliber.  Less than 50% stenosis per NASCET criteria.  The right vertebral artery is normal in caliber.  The left vertebral artery is normal in caliber.  Basilar artery is within normal limits without focal abnormality.  The proximal anterior, middle, and posterior cerebral arteries are within normal limits.  No evidence of significant stenosis, focal occlusion, or intracranial aneurysm.  There is a 3 mm outpouching at the origin of the right posterior communicating artery (series 5, image 350).  The bilateral ophthalmic arteries are patent at least to the level of the orbital apex.  Impression:  No acute intracranial pathology.  No intracranial large vessel occlusion or significant stenosis.  Small outpouching at the origin of the right posterior communicating artery, possibly relating to an infundibulum, noting that a small saccular aneurysm cannot be excluded.  Correlation is advised.    Cardiac Imaging   TTE w bubble 10/1/2024:     Left Ventricle: The left ventricle is normal in size. Normal wall thickness. Septal motion is consistent with post-operative status. There is normal systolic function with a visually estimated ejection fraction of 60 - 65%.    Right Ventricle: Normal right ventricular cavity size. Wall thickness is normal. Systolic function is normal.    Mitral Valve: The mitral valve is repaired by 29 Medtronic Simulus annuloplasty ring.  No MR, No evidence of LVOTO.     Tricuspid Valve: There is mild regurgitation.    Pulmonic Valve: There is mild regurgitation.    Pulmonary Artery: The estimated pulmonary artery systolic pressure is 21 mmHg.    IVC/SVC: Normal venous pressure at 3 mmHg.      Mitral valve repair with quadrangular resection of the P2   scallop of the posterior leaflet, sliding plasty and a 29 mm Medtronic Simulus   band annuloplasty.    Dago Viveros MD  [facility]  Department of Vascular Neurology   [facility] Neurosurgery [facility]

## 2024-10-03 ENCOUNTER — CLINICAL SUPPORT (OUTPATIENT)
Dept: CARDIOLOGY | Facility: HOSPITAL | Age: 54
End: 2024-10-03
Attending: STUDENT IN AN ORGANIZED HEALTH CARE EDUCATION/TRAINING PROGRAM
Payer: COMMERCIAL

## 2024-10-03 VITALS
WEIGHT: 217 LBS | HEIGHT: 67 IN | DIASTOLIC BLOOD PRESSURE: 79 MMHG | HEART RATE: 68 BPM | OXYGEN SATURATION: 95 % | RESPIRATION RATE: 18 BRPM | TEMPERATURE: 98 F | SYSTOLIC BLOOD PRESSURE: 121 MMHG | BODY MASS INDEX: 34.06 KG/M2

## 2024-10-03 DIAGNOSIS — H34.11 CENTRAL RETINAL ARTERY OCCLUSION OF RIGHT EYE: ICD-10-CM

## 2024-10-03 PROBLEM — E87.6 HYPOKALEMIA: Status: RESOLVED | Noted: 2024-10-01 | Resolved: 2024-10-03

## 2024-10-03 LAB
ALBUMIN SERPL BCP-MCNC: 3.3 G/DL (ref 3.5–5.2)
ALP SERPL-CCNC: 75 U/L (ref 55–135)
ALT SERPL W/O P-5'-P-CCNC: 19 U/L (ref 10–44)
ANION GAP SERPL CALC-SCNC: 7 MMOL/L (ref 8–16)
AST SERPL-CCNC: 23 U/L (ref 10–40)
BASOPHILS # BLD AUTO: 0.03 K/UL (ref 0–0.2)
BASOPHILS NFR BLD: 0.7 % (ref 0–1.9)
BILIRUB SERPL-MCNC: 0.5 MG/DL (ref 0.1–1)
BUN SERPL-MCNC: 13 MG/DL (ref 6–20)
CALCIUM SERPL-MCNC: 9.4 MG/DL (ref 8.7–10.5)
CHLORIDE SERPL-SCNC: 110 MMOL/L (ref 95–110)
CO2 SERPL-SCNC: 23 MMOL/L (ref 23–29)
CREAT SERPL-MCNC: 0.9 MG/DL (ref 0.5–1.4)
DIFFERENTIAL METHOD BLD: NORMAL
EOSINOPHIL # BLD AUTO: 0.1 K/UL (ref 0–0.5)
EOSINOPHIL NFR BLD: 3.2 % (ref 0–8)
ERYTHROCYTE [DISTWIDTH] IN BLOOD BY AUTOMATED COUNT: 14 % (ref 11.5–14.5)
EST. GFR  (NO RACE VARIABLE): >60 ML/MIN/1.73 M^2
GLUCOSE SERPL-MCNC: 89 MG/DL (ref 70–110)
HCT VFR BLD AUTO: 41 % (ref 37–48.5)
HGB BLD-MCNC: 13.1 G/DL (ref 12–16)
IMM GRANULOCYTES # BLD AUTO: 0.01 K/UL (ref 0–0.04)
IMM GRANULOCYTES NFR BLD AUTO: 0.2 % (ref 0–0.5)
LYMPHOCYTES # BLD AUTO: 1 K/UL (ref 1–4.8)
LYMPHOCYTES NFR BLD: 25.6 % (ref 18–48)
MAGNESIUM SERPL-MCNC: 2.3 MG/DL (ref 1.6–2.6)
MCH RBC QN AUTO: 27 PG (ref 27–31)
MCHC RBC AUTO-ENTMCNC: 32 G/DL (ref 32–36)
MCV RBC AUTO: 85 FL (ref 82–98)
MONOCYTES # BLD AUTO: 0.3 K/UL (ref 0.3–1)
MONOCYTES NFR BLD: 7.6 % (ref 4–15)
NEUTROPHILS # BLD AUTO: 2.6 K/UL (ref 1.8–7.7)
NEUTROPHILS NFR BLD: 62.7 % (ref 38–73)
NRBC BLD-RTO: 0 /100 WBC
PHOSPHATE SERPL-MCNC: 4 MG/DL (ref 2.7–4.5)
PLATELET # BLD AUTO: 187 K/UL (ref 150–450)
PMV BLD AUTO: 10.6 FL (ref 9.2–12.9)
POTASSIUM SERPL-SCNC: 3.8 MMOL/L (ref 3.5–5.1)
PROT SERPL-MCNC: 6.5 G/DL (ref 6–8.4)
RBC # BLD AUTO: 4.85 M/UL (ref 4–5.4)
SODIUM SERPL-SCNC: 140 MMOL/L (ref 136–145)
TROPONIN I SERPL DL<=0.01 NG/ML-MCNC: 0.01 NG/ML (ref 0–0.03)
WBC # BLD AUTO: 4.07 K/UL (ref 3.9–12.7)

## 2024-10-03 PROCEDURE — 99233 SBSQ HOSP IP/OBS HIGH 50: CPT | Mod: ,,, | Performed by: PSYCHIATRY & NEUROLOGY

## 2024-10-03 PROCEDURE — 80053 COMPREHEN METABOLIC PANEL: CPT

## 2024-10-03 PROCEDURE — 36415 COLL VENOUS BLD VENIPUNCTURE: CPT

## 2024-10-03 PROCEDURE — 25000003 PHARM REV CODE 250

## 2024-10-03 PROCEDURE — 83735 ASSAY OF MAGNESIUM: CPT

## 2024-10-03 PROCEDURE — 85025 COMPLETE CBC W/AUTO DIFF WBC: CPT

## 2024-10-03 PROCEDURE — 84484 ASSAY OF TROPONIN QUANT: CPT

## 2024-10-03 PROCEDURE — 84100 ASSAY OF PHOSPHORUS: CPT

## 2024-10-03 PROCEDURE — 93271 ECG/MONITORING AND ANALYSIS: CPT

## 2024-10-03 PROCEDURE — 63600175 PHARM REV CODE 636 W HCPCS

## 2024-10-03 RX ORDER — CLOPIDOGREL BISULFATE 75 MG/1
75 TABLET ORAL DAILY
Qty: 28 TABLET | Refills: 0 | Status: SHIPPED | OUTPATIENT
Start: 2024-10-04 | End: 2024-11-01

## 2024-10-03 RX ORDER — ATORVASTATIN CALCIUM 40 MG/1
40 TABLET, FILM COATED ORAL DAILY
Qty: 90 TABLET | Refills: 0 | Status: SHIPPED | OUTPATIENT
Start: 2024-10-04 | End: 2025-10-04

## 2024-10-03 RX ORDER — ASPIRIN 81 MG/1
81 TABLET ORAL DAILY
Qty: 90 TABLET | Refills: 1 | Status: SHIPPED | OUTPATIENT
Start: 2024-10-04 | End: 2025-10-04

## 2024-10-03 RX ADMIN — ATORVASTATIN CALCIUM 40 MG: 40 TABLET, FILM COATED ORAL at 09:10

## 2024-10-03 RX ADMIN — HEPARIN SODIUM 5000 UNITS: 5000 INJECTION INTRAVENOUS; SUBCUTANEOUS at 05:10

## 2024-10-03 RX ADMIN — HEPARIN SODIUM 5000 UNITS: 5000 INJECTION INTRAVENOUS; SUBCUTANEOUS at 02:10

## 2024-10-03 RX ADMIN — CLOPIDOGREL BISULFATE 75 MG: 75 TABLET ORAL at 09:10

## 2024-10-03 RX ADMIN — ASPIRIN 81 MG: 81 TABLET, COATED ORAL at 09:10

## 2024-10-03 NOTE — ASSESSMENT & PLAN NOTE
Patient suffers with daily migraine headaches; received IV Mg in the ED that helped    - Her abortive ubrelvy is not on formulary, received another dose of IV Mg on 10/2.  - No concern w/HA this am.

## 2024-10-03 NOTE — PROGRESS NOTES
[facility] - Neurosurgery ([facility])  Vascular Neurology  Comprehensive Stroke Center  Progress Note    Assessment/Plan:     * Central retinal artery occlusion of right eye  Alondra Carrera is a 54 year old female with history of migraine, MVP s/p repair, htn, and hld who presents with painless monocular vision loss of the right eye while watching TV at 10 PM 9/30. Ophthalmology exam consistent with CRAO given pale retina and cherry red spot. Her vision has largely improved, now with generalized blurriness. Recommend completing stroke work up and starting dual antiplatelet therapy.     Neuro exam stable. NAEON. No need for JAH at this time. Patient denies HA this am. Plan to discharge the patient today with referral to Ophthalmology.     - Loaded with aspirin 325 mg x 1 and plavix 300 mg x 1  - Daily CBC, CMP, Mg, P  - A1c5%, TSH WNL, CRP normal  - Cardiac diet     Antithrombotics for secondary stroke prevention: Antiplatelets: Aspirin: 81 mg daily  Clopidogrel: 75 mg daily    Statins for secondary stroke prevention and hyperlipidemia, if present:   Statins: Atorvastatin- 40 mg daily    Aggressive risk factor modification: HTN, Migraine and Hx of MVP repair     Rehab efforts: The patient has been evaluated by a stroke team provider and the therapy needs have been fully considered based off the presenting complaints and exam findings. The following therapy evaluations are needed: PT evaluate and treat, OT evaluate and treat, SLP evaluate and treat    Diagnostics ordered/pending: None     VTE prophylaxis: Heparin 5000 units SQ every 8 hours    BP parameters:  Long term SBP goal is normotension      Hyperlipidemia  -LDL 70.6  - Continue Atorvastatin 40 mg daily    Hypertension  - Maintain normotension  - Prn labetalol   -24° -142    Migraine  Patient suffers with daily migraine headaches; received IV Mg in the ED that helped    - Her abortive ubrelvy is not on formulary, received another dose of IV Mg on 10/2.  -  No concern w/HA this am.    S/P MVR (mitral valve repair)  History of MVR 2018 on asa 81 mg . ECHO 2018 with mild left atrial enlargement, concentric remodeling, enlarged ascending aorta, EF 65-70%.     - TTE with bubble study EF 60 - 65%, no MR or thrombus.           10/2/2024: No acute events overnight, the patient's vision is getting better, she no longer has the black spot in the middle of her vision. She is still complaining of migraine headaches, will order IV Mg. Pending possible JAH.  10/3/2024 Neuro exam stable. NAEON. No need for JAH at this time. Patient denies HA this am. Plan to discharge the patient today with referral to Ophthalmology.     STROKE DOCUMENTATION        NIH Scale:  Interval: 7 days or at discharge (whichever comes first)  1a. Level of Consciousness: 0-->Alert, keenly responsive  1b. LOC Questions: 0-->Answers both questions correctly  1c. LOC Commands: 0-->Performs both tasks correctly  2. Best Gaze: 0-->Normal  3. Visual: 0-->No visual loss  4. Facial Palsy: 0-->Normal symmetrical movements  5a. Motor Arm, Left: 0-->No drift, limb holds 90 (or 45) degrees for full 10 secs  5b. Motor Arm, Right: 0-->No drift, limb holds 90 (or 45) degrees for full 10 secs  6a. Motor Leg, Left: 0-->No drift, leg holds 30 degree position for full 5 secs  6b. Motor Leg, Right: 0-->No drift, leg holds 30 degree position for full 5 secs  7. Limb Ataxia: 0-->Absent  8. Sensory: 0-->Normal, no sensory loss  9. Best Language: 0-->No aphasia, normal  10. Dysarthria: 0-->Normal  11. Extinction and Inattention (formerly Neglect): 0-->No abnormality  Total (NIH Stroke Scale): 0       Modified Rudy Score: 1  Perlita Coma Scale:15   ABCD2 Score:    [id number] Score:   HAS -BLED Score:   ICH Score:   Hunt & Bird Classification:      Hemorrhagic change of an Ischemic Stroke: Does this patient have an ischemic stroke with hemorrhagic changes? No     Neurologic Chief Complaint: Sudden R monocular vision  loss    Subjective:     Interval History: Patient is seen for follow-up neurological assessment and treatment recommendations: See Hospital Course    HPI, Past Medical, Family, and Social History remains the same as documented in the initial encounter.     Review of Systems   Eyes:  Positive for visual disturbance.   All other systems reviewed and are negative.    Scheduled Meds:   aspirin  81 mg Oral Daily    atorvastatin  40 mg Oral Daily    clopidogreL  75 mg Oral Daily    heparin (porcine)  5,000 Units Subcutaneous Q8H     Continuous Infusions:  PRN Meds:  Current Facility-Administered Medications:     acetaminophen, 650 mg, Oral, Q6H PRN    bisacodyL, 10 mg, Rectal, Daily PRN    labetalol, 10 mg, Intravenous, Q6H PRN    ondansetron, 8 mg, Oral, Q8H PRN    polyethylene glycol, 17 g, Oral, BID PRN    senna-docusate 8.6-50 mg, 1 tablet, Oral, BID PRN    sodium chloride 0.9%, 10 mL, Intravenous, PRN    Objective:     Vital Signs (Most Recent):  Temp: 98.1 °F (36.7 °C) (10/03/24 1142)  Pulse: 68 (10/03/24 1153)  Resp: 18 (10/03/24 1142)  BP: 121/79 (10/03/24 1142)  SpO2: 95 % (10/03/24 1142)  BP Location: Left arm    Vital Signs Range (Last 24H):  Temp:  [97.8 °F (36.6 °C)-98.2 °F (36.8 °C)]   Pulse:  [59-90]   Resp:  [17-18]   BP: (120-142)/(79-90)   SpO2:  [95 %-98 %]   BP Location: Left arm       Physical Exam  Vitals and nursing note reviewed.   Eyes:      General:         Right eye: No discharge.         Left eye: No discharge.      Extraocular Movements: Extraocular movements intact.      Conjunctiva/sclera: Conjunctivae normal.   Cardiovascular:      Rate and Rhythm: Normal rate.   Pulmonary:      Effort: Pulmonary effort is normal. No respiratory distress.   Abdominal:      General: There is no distension.   Musculoskeletal:      Cervical back: Normal range of motion and neck supple.   Skin:     General: Skin is warm and dry.   Neurological:      Mental Status: She is alert and oriented to person, place, and  time.      Sensory: No sensory deficit.      Motor: No weakness.   Psychiatric:         Mood and Affect: Mood normal.          Neurological Exam:   LOC: alert  Attention Span: Good   Language: No aphasia  Articulation: No dysarthria  Orientation: Person, Place, Time   EOM (CN III, IV, VI): Full/intact  Facial Movement (CN VII): Symmetric facial expression    Motor: Arm left  Normal 5/5  Leg left  Normal 5/5  Arm right  Normal 5/5  Leg right Normal 5/5  Cerebellum: No evidence of appendicular or axial ataxia  Sensation: Intact to light touch  Tone: Normal tone throughout    Laboratory:  CMP:   Recent Labs   Lab 10/03/24  0530   CALCIUM 9.4   ALBUMIN 3.3*   PROT 6.5      K 3.8   CO2 23      BUN 13   CREATININE 0.9   ALKPHOS 75   ALT 19   AST 23   BILITOT 0.5     CBC:   Recent Labs   Lab 10/03/24  0530   WBC 4.07   RBC 4.85   HGB 13.1   HCT 41.0      MCV 85   MCH 27.0   MCHC 32.0     Lipid Panel:   Recent Labs   Lab 10/01/24  0308   CHOL 149   LDLCALC 70.6   HDL 48   TRIG 152*     Coagulation:   Recent Labs   Lab 10/01/24  0521   INR 1.0   APTT 26.6     Hgb A1C:   Recent Labs   Lab 10/01/24  0308   HGBA1C 5.0     TSH:   Recent Labs   Lab 10/01/24  0521   TSH 1.253       Diagnostic Results All imaging personally reviewed    Brain Imaging   MRI brain 10/1/2024: Impression:No acute intracranial pathology.  Few scattered punctate foci subcortical and periventricular FLAIR signal hyperintensity, overall nonspecific, may relate to sequela of mild microvascular ischemic change.  Few punctate foci susceptibility artifact about the left frontal lobe and cerebellar hemispheres, likely representing sequela of remote microhemorrhage.     Vessel Imaging   CTA Head & Neck 10/1/2024: Impression: No acute intracranial pathology.  No intracranial large vessel occlusion or significant stenosis.  Small outpouching at the origin of the right posterior communicating artery, possibly relating to an infundibulum, noting  that a small saccular aneurysm cannot be excluded.  Correlation is advised.     Cardiac Imaging   TTE w bubble 10/1/2024:     Left Ventricle: The left ventricle is normal in size. Normal wall thickness. Septal motion is consistent with post-operative status. There is normal systolic function with a visually estimated ejection fraction of 60 - 65%.    Right Ventricle: Normal right ventricular cavity size. Wall thickness is normal. Systolic function is normal.    Mitral Valve: The mitral valve is repaired by 29 Medtronic Simulus annuloplasty ring.  No MR, No evidence of LVOTO.    Tricuspid Valve: There is mild regurgitation.    Pulmonic Valve: There is mild regurgitation.    Pulmonary Artery: The estimated pulmonary artery systolic pressure is 21 mmHg.    IVC/SVC: Normal venous pressure at 3 mmHg.         Lina Tovar, BRE  [facility]  Department of Vascular Neurology   [facility] Neurosurgery [facility])

## 2024-10-03 NOTE — PLAN OF CARE
Spoke to  Jacky at [facility] to schedule patient follow-up appointment, and the patient follow-up appointment was scheduled for 10/14/24 at 1:20 pm.        Guilherme PIRES, RSW  Case Management  [phone]

## 2024-10-03 NOTE — PLAN OF CARE
Problem: Adult Inpatient Plan of Care  Goal: Plan of Care Review  Outcome: Progressing  Goal: Optimal Comfort and Wellbeing  Outcome: Progressing     Problem: Stroke, Ischemic (Includes Transient Ischemic Attack)  Goal: Optimal Functional Ability  Outcome: Progressing   POC reviewed and updated with the patient. Questions regarding POC were encouraged and addressed with the patient.  VSS, see flow-sheets. Tele maintained per order. AMANDO. Patient is AO X 4 at this time. Fall/safety precautions maintained, no signs of injury noted during shift. Upon exiting room, patient's bed locked in low position, side rails up x 3, bed alarm set, with call light within reach. Instructed patient to call staff for assistance, verbalized understanding.  No acute signs of distress noted at this time.

## 2024-10-03 NOTE — CARE UPDATE
I have reviewed the chart of Alondra Carrera who is hospitalized for the following:    Active Hospital Problems    Diagnosis    *Central retinal artery occlusion of right eye    Hypertension    Hyperlipidemia    Hypokalemia     POA  K 3.8  Monitor with daily cmp  replace      Hyperparathyroidism     Lab Results   Component Value Date    PTH 88.4 (H) 11/07/2023    PTH 85.4 (H) 04/12/2023     Lab Results   Component Value Date    CALCIUM 9.4 11/07/2023    CALCIUM 9.1 04/12/2023    CALCIUM 9.8 01/24/2018     Lab Results   Component Value Date    AIIARSCW23ST 19 (L) 11/07/2023    [id number] 29 (L) 04/12/2023     Lab Results   Component Value Date    PHOS 3.6 01/24/2018    PHOS 3.5 01/23/2018    PHOS 3.2 01/22/2018         Migraine    S/P MVR (mitral valve repair)        Iraida Fox NP  Unit Based [facility]

## 2024-10-03 NOTE — ASSESSMENT & PLAN NOTE
Alondra Carrera is a 54 year old female with history of migraine, MVP s/p repair, htn, and hld who presents with painless monocular vision loss of the right eye while watching TV at 10 PM 9/30. Ophthalmology exam consistent with CRAO given pale retina and cherry red spot. Her vision has largely improved, now with generalized blurriness. Recommend completing stroke work up and starting dual antiplatelet therapy.     Neuro exam stable. NAEON. No need for JAH at this time. Patient denies HA this am. Plan to discharge the patient today with referral to Ophthalmology.     - Loaded with aspirin 325 mg x 1 and plavix 300 mg x 1  - Daily CBC, CMP, Mg, P  - A1c5%, TSH WNL, CRP normal  - Cardiac diet     Antithrombotics for secondary stroke prevention: Antiplatelets: Aspirin: 81 mg daily  Clopidogrel: 75 mg daily    Statins for secondary stroke prevention and hyperlipidemia, if present:   Statins: Atorvastatin- 40 mg daily    Aggressive risk factor modification: HTN, Migraine and Hx of MVP repair     Rehab efforts: The patient has been evaluated by a stroke team provider and the therapy needs have been fully considered based off the presenting complaints and exam findings. The following therapy evaluations are needed: PT evaluate and treat, OT evaluate and treat, SLP evaluate and treat    Diagnostics ordered/pending: None     VTE prophylaxis: Heparin 5000 units SQ every 8 hours    BP parameters:  Long term SBP goal is normotension

## 2024-10-03 NOTE — PLAN OF CARE
[facility] Neurosurgery (Hospital)  Discharge Final Note    Primary Care Provider: Karson Jackson Jr., MD    Expected Discharge Date: 10/3/2024    Final Discharge Note (most recent)       Final Note - 10/03/24 1414          Final Note    Assessment Type Final Discharge Note (P)      Anticipated Discharge Disposition Home or Self Care (P)         Post-Acute Status    Post-Acute Authorization Other (P)      Other Status No Post-Acute Service Needs (P)                  Patient discharging home with spouse.  No post acute needs.      Mariam Sin, LMSW [facility]  [phone]    Future Appointments   Date Time Provider Department Center   10/22/2024  9:45 AM Sedrick Grimm MD [facility]   10/30/2024  7:30 AM LAB, [facility] DRAW STATION [facility] LAB [facility]   11/6/2024  2:40 PM Karson Jackson Jr., MD [facility]   11/8/2024  8:30 AM Baylee Ellison MD [facility]                  Contact Info       Karson Jackson Jr., MD   Specialty: Family Medicine   Relationship: PCP - General  Hypertension Digital Medicine Responsible Provider    [address] [facility] [address]   Phone: [phone]       Next Steps: Schedule an appointment as soon as possible for a visit    [facility] [address]   Specialty: Ophthalmology    [address]  [place] [address]   Phone: [phone]       Next Steps: Schedule an appointment as soon as possible for a visit    [facility] VASCULAR NEUROLOGY   Specialty: Vascular Neurology    [address] [facility] [address]   Phone: [phone]       Next Steps: Follow up    Instructions: clinic will contact you to schedule f/u appt.

## 2024-10-03 NOTE — ASSESSMENT & PLAN NOTE
Patient suffers with daily migraine headaches; received IV Mg in the ED that helped    - Her abortive ubrelvy is not on formulary, received another dose of IV Mg on 10/2.  - Headache controlled prior to discharge.

## 2024-10-03 NOTE — ASSESSMENT & PLAN NOTE
Alondra Carrera is a 54 year old female with history of migraine, MVP s/p repair, htn, and hld who presents with painless monocular vision loss of the right eye while watching TV at 10 PM 9/30. Ophthalmology exam consistent with CRAO given pale retina and cherry red spot. Her vision has largely improved, now with generalized blurriness. Recommend completing stroke work up and starting dual antiplatelet therapy. Neuro exam remained stable while hospitalized. No need for JAH at this time. Headaches controlled. Plan to discharge the patient today with referral to Ophthalmology.     - Loaded with aspirin 325 mg x 1 and plavix 300 mg x 1. Continue DAPT for 28 days, then ASA 81 mg only  - Cardiac/Mediterranean diet    Antithrombotics for secondary stroke prevention: Antiplatelets: Aspirin: 81 mg daily  Clopidogrel: 75 mg daily-Continue DAPT for 28 days as stated above.    Statins for secondary stroke prevention and hyperlipidemia, if present:   Statins: Atorvastatin- 40 mg daily    Aggressive risk factor modification: HTN, Migraine and Hx of MVP repair     Rehab efforts: The patient has been evaluated by a stroke team provider and the therapy needs have been fully considered based off the presenting complaints and exam findings. The following therapy evaluations are needed: -Evaluated by PT/OT during hospitalization and found to have no therapy needs.    Diagnostics ordered/pending: None     BP parameters:  Long term SBP goal is normotension

## 2024-10-03 NOTE — DISCHARGE SUMMARY
[facility] Neurosurgery ([facility])  Vascular Neurology  Comprehensive Stroke Center  Discharge Summary     Summary:     Admit Date: 9/30/2024 10:44 PM    Discharge Date and Time:  10/03/2024 2:15 PM    Attending Physician: Bozena Pavon MD     Discharge Provider: Lina Tovar DNP    History of Present Illness: Alondra Carrera is 54 year old female with history of migraine (on emgality, topamax, and ubrelvy), MVP s/p repair 2018, htn, and hld who presents with painless right monocular vision loss at 10 PM 9/30 while watching TV. Opthalmology evaluated and her exam is most consistent with CRAO. Her vision has improved since arrival to the ED, first with restored vision temporally, now mainly blurriness throughout her visual field; NIH 0. CT Head is non specific. Recommend obtaining MRI/MRA brain and neck, echo with bubble study, and starting DAPT.     Hospital Course (synopsis of major diagnoses, care, treatment, and services provided during the course of the hospital stay): 10/2/2024: No acute events overnight, the patient's vision is getting better, she no longer has the black spot in the middle of her vision. She is still complaining of migraine headaches, will order IV Mg. Pending possible JAH.  10/3/2024 Neuro exam stable. NAEON. No need for JAH at this time. Patient denies HA this am. Plan to discharge the patient today with referral to Ophthalmology.     Goals of Care Treatment Preferences:  Code Status: Full Code      Stroke Etiology: Ischemic Undetermined Cause Cryptogenic Embolism / ESUS (Embolic Stroke of Undetermined Source)    STROKE DOCUMENTATION         NIH Scale:  Interval: 7 days or at discharge (whichever comes first)  1a. Level of Consciousness: 0-->Alert, keenly responsive  1b. LOC Questions: 0-->Answers both questions correctly  1c. LOC Commands: 0-->Performs both tasks correctly  2. Best Gaze: 0-->Normal  3. Visual: 0-->No visual loss  4. Facial Palsy: 0-->Normal symmetrical  movements  5a. Motor Arm, Left: 0-->No drift, limb holds 90 (or 45) degrees for full 10 secs  5b. Motor Arm, Right: 0-->No drift, limb holds 90 (or 45) degrees for full 10 secs  6a. Motor Leg, Left: 0-->No drift, leg holds 30 degree position for full 5 secs  6b. Motor Leg, Right: 0-->No drift, leg holds 30 degree position for full 5 secs  7. Limb Ataxia: 0-->Absent  8. Sensory: 0-->Normal, no sensory loss  9. Best Language: 0-->No aphasia, normal  10. Dysarthria: 0-->Normal  11. Extinction and Inattention (formerly Neglect): 0-->No abnormality  Total (NIH Stroke Scale): 0        Modified [place] Score: 1  [place] Coma Scale:15   ABCD2 Score:    [id number]-DGN Score:   HAS -BLED Score:   ICH Score:   Hunt & Bird Classification:       Assessment/Plan:     Diagnostic Results:    Brain Imaging   MRI brain 10/1/2024: Impression:No acute intracranial pathology.  Few scattered punctate foci subcortical and periventricular FLAIR signal hyperintensity, overall nonspecific, may relate to sequela of mild microvascular ischemic change.  Few punctate foci susceptibility artifact about the left frontal lobe and cerebellar hemispheres, likely representing sequela of remote microhemorrhage.     Vessel Imaging   CTA Head & Neck 10/1/2024: Impression: No acute intracranial pathology.  No intracranial large vessel occlusion or significant stenosis.  Small outpouching at the origin of the right posterior communicating artery, possibly relating to an infundibulum, noting that a small saccular aneurysm cannot be excluded.  Correlation is advised.     Cardiac Imaging   TTE w bubble 10/1/2024:     Left Ventricle: The left ventricle is normal in size. Normal wall thickness. Septal motion is consistent with post-operative status. There is normal systolic function with a visually estimated ejection fraction of 60 - 65%.    Right Ventricle: Normal right ventricular cavity size. Wall thickness is normal. Systolic function is normal.    Mitral  Valve: The mitral valve is repaired by 29 Medtronic Simulus annuloplasty ring.  No MR, No evidence of LVOTO.    Tricuspid Valve: There is mild regurgitation.    Pulmonic Valve: There is mild regurgitation.    Pulmonary Artery: The estimated pulmonary artery systolic pressure is 21 mmHg.    IVC/SVC: Normal venous pressure at 3 mmHg.       Interventions: None    Complications: None    Disposition: Home or Self Care    Final Active Diagnoses:    Diagnosis Date Noted POA    PRINCIPAL PROBLEM:  Central retinal artery occlusion of right eye [H34.11] 10/01/2024 Yes    Hypertension [I10] 10/01/2024 Yes     Chronic    Hyperlipidemia [E78.5] 10/01/2024 Yes     Chronic    Hyperparathyroidism [E21.3] 03/24/2024 Yes     Chronic    Migraine [G43.909] 12/28/2018 Yes     Chronic    S/P MVR (mitral valve repair) [Z98.890] 01/19/2018 Not Applicable     Chronic      Problems Resolved During this Admission:    Diagnosis Date Noted Date Resolved POA    Hypokalemia [E87.6] 10/01/2024 10/03/2024 Yes     Neuro  Migraine  Patient suffers with daily migraine headaches; received IV Mg in the ED that helped    - Her abortive ubrelvy is not on formulary, received another dose of IV Mg on 10/2.  - Headache controlled prior to discharge.    Ophtho  * Central retinal artery occlusion of right eye  Alondra Carrera is a 54 year old female with history of migraine, MVP s/p repair, htn, and hld who presents with painless monocular vision loss of the right eye while watching TV at 10 PM 9/30. Ophthalmology exam consistent with CRAO given pale retina and cherry red spot. Her vision has largely improved, now with generalized blurriness. Recommend completing stroke work up and starting dual antiplatelet therapy. Neuro exam remained stable while hospitalized. No need for JAH at this time. Headaches controlled. Plan to discharge the patient today with referral to Ophthalmology.     - Loaded with aspirin 325 mg x 1 and plavix 300 mg x 1. Continue DAPT for  28 days, then ASA 81 mg only  - Cardiac/Mediterranean diet    Antithrombotics for secondary stroke prevention: Antiplatelets: Aspirin: 81 mg daily  Clopidogrel: 75 mg daily-Continue DAPT for 28 days as stated above.    Statins for secondary stroke prevention and hyperlipidemia, if present:   Statins: Atorvastatin- 40 mg daily    Aggressive risk factor modification: HTN, Migraine and Hx of MVP repair     Rehab efforts: The patient has been evaluated by a stroke team provider and the therapy needs have been fully considered based off the presenting complaints and exam findings. The following therapy evaluations are needed: -Evaluated by PT/OT during hospitalization and found to have no therapy needs.    Diagnostics ordered/pending: None     BP parameters:  Long term SBP goal is normotension      Cardiac/Vascular  Hypertension  - Maintain normotension  -24° -142  -Resume home BP meds        Recommendations:     Post-discharge complication risks: Falls    Stroke Education given to: patient    Follow-up in Stroke Clinic in 4-6 weeks.     Discharge Plan:  Antithrombotics: Aspirin 81 mg, Clopidogrel 75mg  Statin: Atorvastatin 40 mg  Aggresive risk factor modification:  Hypertension  Hx of MVP repair    Follow Up:   Follow-up Information       Karson Jackson Jr., MD. Schedule an appointment as soon as possible for a visit .    Specialty: Family Medicine  Contact information:  [address] [facility] [address] [phone]               [facility] - [address] [place] [address]. Schedule an appointment as soon as possible for a visit .    Specialty: Ophthalmology  Contact information:  [facility]  [facility] [address] [phone]  Additional information:  Please arrive on the 10th floor for check-in.             [facility] VASCULAR NEUROLOGY Follow up.    Specialty: Vascular Neurology  Why: clinic will contact you to schedule f/u appt.  Contact information:  [address]  [facility]  [address]  [phone]                           Patient Instructions:      Ambulatory referral/consult to Vascular Neurology   Standing Status: Future   Referral Priority: Routine Referral Type: Consultation   Referral Reason: Specialty Services Required   Requested Specialty: Vascular Neurology   Number of Visits Requested: 1     Cardiac event monitor   Standing Status: Future Standing Exp. Date: 10/03/25     Order Specific Question Answer Comments   Cardiac Event Monitor Auto Trigger    Release to patient Immediate        Medications:  Reconciled Home Medications:      Medication List        START taking these medications      clopidogreL 75 mg tablet  Commonly known as: PLAVIX  Take 1 tablet (75 mg total) by mouth once daily.  Start taking on: October 4, 2024            CHANGE how you take these medications      aspirin 81 MG EC tablet  Commonly known as: ECOTRIN  Take 1 tablet (81 mg total) by mouth once daily.  Start taking on: October 4, 2024  What changed:   medication strength  how much to take  when to take this     atorvastatin 40 MG tablet  Commonly known as: LIPITOR  Take 1 tablet (40 mg total) by mouth once daily.  Start taking on: October 4, 2024  What changed:   medication strength  how much to take            CONTINUE taking these medications      amLODIPine 5 MG tablet  Commonly known as: NORVASC  Take 1 tablet (5 mg total) by mouth once daily.     EMGALITY  mg/mL Pnij  Generic drug: galcanezumab-gnlm  Inject 1 mL (120 mg total) into the skin every 28 days.     ketoconazole 2 % cream  Commonly known as: NIZORAL  Apply topically once daily.     metoprolol tartrate 50 MG tablet  Commonly known as: LOPRESSOR  TAKE 1 TABLET BY MOUTH TWICE A DAY     nystatin powder  Commonly known as: MYCOSTATIN  Apply topically 2 (two) times daily.     omeprazole 40 MG capsule  Commonly known as: PRILOSEC  Take 40 mg by mouth every morning.     topiramate 100 MG tablet  Commonly known as: TOPAMAX  Take 1 tablet  (100 mg total) by mouth 2 (two) times daily.     UBRELVY 100 mg tablet  Generic drug: ubrogepant  Take 1 tablet (100 mg total) by mouth as needed for Migraine. Okay to take 2nd dose in 2 hrs if mild relief, no more than 2 in 24 hrs.     * WEGOVY 0.25 mg/0.5 mL Pnij  Generic drug: semaglutide (weight loss)  Inject 0.25 mg into the skin every 7 days.     * WEGOVY 0.5 mg/0.5 mL Pnij  Generic drug: semaglutide (weight loss)  Inject 0.5 mg into the skin every 7 days.     * WEGOVY 1 mg/0.5 mL Pnij  Generic drug: semaglutide (weight loss)  Inject 1 mg into the skin every 7 days.           * This list has 3 medication(s) that are the same as other medications prescribed for you. Read the directions carefully, and ask your doctor or other care provider to review them with you.                STOP taking these medications      ibuprofen 600 MG tablet  Commonly known as: MIO ALEJANDRO DNP  [facility]  Department of Vascular Neurology   [facility] Neurosurgery ([facility])

## 2024-10-03 NOTE — SUBJECTIVE & OBJECTIVE
Neurologic Chief Complaint: Sudden R monocular vision loss    Subjective:     Interval History: Patient is seen for follow-up neurological assessment and treatment recommendations: See Hospital Course    HPI, Past Medical, Family, and Social History remains the same as documented in the initial encounter.     Review of Systems   Eyes:  Positive for visual disturbance.   All other systems reviewed and are negative.    Scheduled Meds:   aspirin  81 mg Oral Daily    atorvastatin  40 mg Oral Daily    clopidogreL  75 mg Oral Daily    heparin (porcine)  5,000 Units Subcutaneous Q8H     Continuous Infusions:  PRN Meds:  Current Facility-Administered Medications:     acetaminophen, 650 mg, Oral, Q6H PRN    bisacodyL, 10 mg, Rectal, Daily PRN    labetalol, 10 mg, Intravenous, Q6H PRN    ondansetron, 8 mg, Oral, Q8H PRN    polyethylene glycol, 17 g, Oral, BID PRN    senna-docusate 8.6-50 mg, 1 tablet, Oral, BID PRN    sodium chloride 0.9%, 10 mL, Intravenous, PRN    Objective:     Vital Signs (Most Recent):  Temp: 98.1 °F (36.7 °C) (10/03/24 1142)  Pulse: 68 (10/03/24 1153)  Resp: 18 (10/03/24 1142)  BP: 121/79 (10/03/24 1142)  SpO2: 95 % (10/03/24 1142)  BP Location: Left arm    Vital Signs Range (Last 24H):  Temp:  [97.8 °F (36.6 °C)-98.2 °F (36.8 °C)]   Pulse:  [59-90]   Resp:  [17-18]   BP: (120-142)/(79-90)   SpO2:  [95 %-98 %]   BP Location: Left arm       Physical Exam  Vitals and nursing note reviewed.   Eyes:      General:         Right eye: No discharge.         Left eye: No discharge.      Extraocular Movements: Extraocular movements intact.      Conjunctiva/sclera: Conjunctivae normal.   Cardiovascular:      Rate and Rhythm: Normal rate.   Pulmonary:      Effort: Pulmonary effort is normal. No respiratory distress.   Abdominal:      General: There is no distension.   Musculoskeletal:      Cervical back: Normal range of motion and neck supple.   Skin:     General: Skin is warm and dry.   Neurological:      Mental  Status: She is alert and oriented to person, place, and time.      Sensory: No sensory deficit.      Motor: No weakness.   Psychiatric:         Mood and Affect: Mood normal.          Neurological Exam:   LOC: alert  Attention Span: Good   Language: No aphasia  Articulation: No dysarthria  Orientation: Person, Place, Time   EOM (CN III, IV, VI): Full/intact  Facial Movement (CN VII): Symmetric facial expression    Motor: Arm left  Normal 5/5  Leg left  Normal 5/5  Arm right  Normal 5/5  Leg right Normal 5/5  Cerebellum: No evidence of appendicular or axial ataxia  Sensation: Intact to light touch  Tone: Normal tone throughout    Laboratory:  CMP:   Recent Labs   Lab 10/03/24  0530   CALCIUM 9.4   ALBUMIN 3.3*   PROT 6.5      K 3.8   CO2 23      BUN 13   CREATININE 0.9   ALKPHOS 75   ALT 19   AST 23   BILITOT 0.5     CBC:   Recent Labs   Lab 10/03/24  0530   WBC 4.07   RBC 4.85   HGB 13.1   HCT 41.0      MCV 85   MCH 27.0   MCHC 32.0     Lipid Panel:   Recent Labs   Lab 10/01/24  0308   CHOL 149   LDLCALC 70.6   HDL 48   TRIG 152*     Coagulation:   Recent Labs   Lab 10/01/24  0521   INR 1.0   APTT 26.6     Hgb A1C:   Recent Labs   Lab 10/01/24  0308   HGBA1C 5.0     TSH:   Recent Labs   Lab 10/01/24  0521   TSH 1.253       Diagnostic Results All imaging personally reviewed    Brain Imaging   MRI brain 10/1/2024: Impression:No acute intracranial pathology.  Few scattered punctate foci subcortical and periventricular FLAIR signal hyperintensity, overall nonspecific, may relate to sequela of mild microvascular ischemic change.  Few punctate foci susceptibility artifact about the left frontal lobe and cerebellar hemispheres, likely representing sequela of remote microhemorrhage.     Vessel Imaging   CTA Head & Neck 10/1/2024: Impression: No acute intracranial pathology.  No intracranial large vessel occlusion or significant stenosis.  Small outpouching at the origin of the right posterior communicating  artery, possibly relating to an infundibulum, noting that a small saccular aneurysm cannot be excluded.  Correlation is advised.     Cardiac Imaging   TTE w bubble 10/1/2024:     Left Ventricle: The left ventricle is normal in size. Normal wall thickness. Septal motion is consistent with post-operative status. There is normal systolic function with a visually estimated ejection fraction of 60 - 65%.    Right Ventricle: Normal right ventricular cavity size. Wall thickness is normal. Systolic function is normal.    Mitral Valve: The mitral valve is repaired by 29 [employer]tronic Simulus annuloplasty ring.  No MR, No evidence of LVOTO.    Tricuspid Valve: There is mild regurgitation.    Pulmonic Valve: There is mild regurgitation.    Pulmonary Artery: The estimated pulmonary artery systolic pressure is 21 mmHg.    IVC/SVC: Normal venous pressure at 3 mmHg.

## 2024-10-06 ENCOUNTER — PATIENT MESSAGE (OUTPATIENT)
Dept: ADMINISTRATIVE | Facility: OTHER | Age: 54
End: 2024-10-06
Payer: COMMERCIAL

## 2024-10-09 ENCOUNTER — PATIENT MESSAGE (OUTPATIENT)
Dept: ADMINISTRATIVE | Facility: OTHER | Age: 54
End: 2024-10-09
Payer: COMMERCIAL

## 2024-10-14 ENCOUNTER — OFFICE VISIT (OUTPATIENT)
Dept: FAMILY MEDICINE | Facility: CLINIC | Age: 54
End: 2024-10-14
Payer: COMMERCIAL

## 2024-10-14 VITALS
RESPIRATION RATE: 19 BRPM | HEIGHT: 67 IN | WEIGHT: 206.56 LBS | BODY MASS INDEX: 32.42 KG/M2 | SYSTOLIC BLOOD PRESSURE: 108 MMHG | HEART RATE: 72 BPM | DIASTOLIC BLOOD PRESSURE: 66 MMHG | OXYGEN SATURATION: 98 %

## 2024-10-14 DIAGNOSIS — G47.00 INSOMNIA, UNSPECIFIED TYPE: ICD-10-CM

## 2024-10-14 DIAGNOSIS — H54.7 UNSPECIFIED VISUAL LOSS: ICD-10-CM

## 2024-10-14 DIAGNOSIS — I67.9 CEREBROVASCULAR LESION: Primary | ICD-10-CM

## 2024-10-14 PROCEDURE — 99999 PR PBB SHADOW E&M-EST. PATIENT-LVL V: CPT | Mod: PBBFAC,,, | Performed by: FAMILY MEDICINE

## 2024-10-14 PROCEDURE — 1111F DSCHRG MED/CURRENT MED MERGE: CPT | Mod: CPTII,S$GLB,, | Performed by: FAMILY MEDICINE

## 2024-10-14 PROCEDURE — 1160F RVW MEDS BY RX/DR IN RCRD: CPT | Mod: CPTII,S$GLB,, | Performed by: FAMILY MEDICINE

## 2024-10-14 PROCEDURE — 3078F DIAST BP <80 MM HG: CPT | Mod: CPTII,S$GLB,, | Performed by: FAMILY MEDICINE

## 2024-10-14 PROCEDURE — 3044F HG A1C LEVEL LT 7.0%: CPT | Mod: CPTII,S$GLB,, | Performed by: FAMILY MEDICINE

## 2024-10-14 PROCEDURE — 99214 OFFICE O/P EST MOD 30 MIN: CPT | Mod: S$GLB,,, | Performed by: FAMILY MEDICINE

## 2024-10-14 PROCEDURE — 3074F SYST BP LT 130 MM HG: CPT | Mod: CPTII,S$GLB,, | Performed by: FAMILY MEDICINE

## 2024-10-14 PROCEDURE — 3008F BODY MASS INDEX DOCD: CPT | Mod: CPTII,S$GLB,, | Performed by: FAMILY MEDICINE

## 2024-10-14 PROCEDURE — 1159F MED LIST DOCD IN RCRD: CPT | Mod: CPTII,S$GLB,, | Performed by: FAMILY MEDICINE

## 2024-10-14 RX ORDER — HYDROXYZINE HYDROCHLORIDE 25 MG/1
25 TABLET, FILM COATED ORAL NIGHTLY PRN
Qty: 30 TABLET | Refills: 0 | Status: SHIPPED | OUTPATIENT
Start: 2024-10-14

## 2024-10-17 ENCOUNTER — HOSPITAL ENCOUNTER (OUTPATIENT)
Dept: RADIOLOGY | Facility: HOSPITAL | Age: 54
Discharge: HOME | End: 2024-10-17
Attending: FAMILY MEDICINE
Payer: COMMERCIAL

## 2024-10-17 DIAGNOSIS — H54.7 UNSPECIFIED VISUAL LOSS: ICD-10-CM

## 2024-10-17 DIAGNOSIS — I67.9 CEREBROVASCULAR LESION: ICD-10-CM

## 2024-10-17 PROCEDURE — 70544 MR ANGIOGRAPHY HEAD W/O DYE: CPT | Mod: 26,,, | Performed by: RADIOLOGY

## 2024-10-17 PROCEDURE — 70544 MR ANGIOGRAPHY HEAD W/O DYE: CPT | Mod: TC

## 2024-10-20 NOTE — PROGRESS NOTES
"  Patient Name: Alondra Carrera    : 1970  MRN: [id number]        Subjective:     Patient ID: Alondra is a 54 y.o. female    Chief Complaint:  Follow-up    54-year-old female presents for hospital follow-up.  She presents to the emergency room on  after having a transient vision loss on the right eye.  She was diagnosed with retinal artery occlusion and she has a follow-up scheduled with Ophthalmology on .  She reports no further vision loss         Review of Systems   Eyes:  Positive for visual disturbance.   Respiratory:  Negative for shortness of breath and wheezing.    Cardiovascular:  Negative for chest pain and palpitations.   Gastrointestinal:  Negative for abdominal pain and blood in stool.   Psychiatric/Behavioral:  Positive for sleep disturbance.         Objective:   /66   Pulse 72   Resp 19   Ht 5' 7" (1.702 m)   Wt 93.7 kg (206 lb 9.1 oz)   SpO2 98%   BMI 32.35 kg/m²     Physical Exam   Narrative & Impression  EXAMINATION:  CTA HEAD AND NECK (XPD)     CLINICAL HISTORY:  Vision loss, monocular;     TECHNIQUE:  Axial CT images obtained throughout the region of the head before and after the administration of intravenous contrast.     CT angiogram was performed through the cervical and intracranial vasculature during the IV bolus administration of 100mL of Omnipaque 350.  Multiplanar MPR and MIP reformats were performed.     COMPARISON:  CT head 2024, 2022, 2011     FINDINGS:  The ventricles are normal in size without evidence of hydrocephalus.     No evidence of recent or remote major vascular distribution infarct.  No acute hemorrhage.  No significant intracranial mass effect or midline shift.  No enhancing lesions.  Again noted are prominent calcifications involving the bilateral globus pallidus, similar to prior.     No extra-axial blood or fluid collections.     The cranium appears intact. Mastoid air cells and paranasal sinuses are " essentially clear.     Pharynx/larynx: The nasopharynx, oropharynx, hypopharynx larynx and proximal trachea are within normal limits. .     Glands: Bilateral parotid and submandibular glands are within normal limits. Thyroid gland is unremarkable.     No evidence for adenopathy throughout the neck by size criteria.     Scattered calcified granulomas.  No consolidation, pleural effusion, or pneumothorax.     Mild degenerative changes involving the cervical spine.  Median sternotomy wires in place.     CTA:     The aortic arch demonstratescommon origin of the right subclavian artery and left common carotid artery.  No significant stenosis at the major branch vessel origins.     The right common carotid artery is normal in caliber. No significant stenosis at the carotid bifurcation.The right internal carotid artery is normal in caliber.  Less than 50% stenosis per NASCET criteria.     The left common carotid artery is normal in caliber. No significant stenosis at the carotid bifurcation.The left internal carotid artery is normal in caliber.  Less than 50% stenosis per NASCET criteria.     The right vertebral artery is normal in caliber.     The left vertebral artery is normal in caliber.     Basilar artery is within normal limits without focal abnormality.     The proximal anterior, middle, and posterior cerebral arteries are within normal limits.  No evidence of significant stenosis, focal occlusion, or intracranial aneurysm.     There is a 3 mm outpouching at the origin of the right posterior communicating artery (series 5, image 350).     The bilateral ophthalmic arteries are patent at least to the level of the orbital apex.        Impression:     No acute intracranial pathology.     No intracranial large vessel occlusion or significant stenosis.     Small outpouching at the origin of the right posterior communicating artery, possibly relating to an infundibulum, noting that a small saccular aneurysm cannot be  excluded.  Correlation is advised.     Electronically signed by resident: Jun Vela  Date:                                            10/01/2024  Time:                                           11:36     Electronically signed by:Jean Paul Bates  Date:                                            10/01/2024  Time:                                           12:24    Assessment        ICD-10-CM ICD-9-CM   1. Cerebrovascular lesion  I67.9 437.9   2. Unspecified visual loss  H54.7 369.9   3. Insomnia, unspecified type  G47.00 780.52         Plan:     1. Cerebrovascular lesion/  2. Unspecified visual loss  -     MRA Brain without contrast; Future; Expected date: 10/14/2024  Given CTA finding which could not rule out communicating artery aneurysm, will get MRA of brain for further evaluation.    3. Insomnia, unspecified type  -     hydrOXYzine HCL (ATARAX) 25 MG tablet; Take 1 tablet (25 mg total) by mouth nightly as needed (Insomnia).  Dispense: 30 tablet; Refill: 0  Risks and benefits of hydroxyzine for insomnia discussed.  Hydroxyzine prescribed         -Karson Jackson Jr., MD, AAHIVS      This office note has been dictated.  This dictation has been generated using M-Modal Fluency Direct dictation; some phonetic errors may occur.         There are no Patient Instructions on file for this visit.      No follow-ups on file.   Future Appointments   Date Time Provider Department Center   10/22/2024  9:45 AM Sedrick Grimm MD [facility]   10/30/2024  7:30 AM LAB, [facility] DRAW STATION [facility]   11/6/2024  2:40 PM Karson Jackson Jr., MD [facility]   11/8/2024  8:30 AM Baylee Ellison MD [facility]

## 2024-10-22 ENCOUNTER — OFFICE VISIT (OUTPATIENT)
Dept: OPHTHALMOLOGY | Facility: CLINIC | Age: 54
End: 2024-10-22
Attending: OPHTHALMOLOGY
Payer: COMMERCIAL

## 2024-10-22 DIAGNOSIS — H25.13 CATARACT, NUCLEAR SCLEROTIC, BOTH EYES: ICD-10-CM

## 2024-10-22 DIAGNOSIS — H34.11 CENTRAL RETINAL ARTERY OCCLUSION, RIGHT: Primary | ICD-10-CM

## 2024-10-22 PROCEDURE — 92134 CPTRZ OPH DX IMG PST SGM RTA: CPT | Mod: S$GLB,,, | Performed by: OPHTHALMOLOGY

## 2024-10-22 PROCEDURE — 99999 PR PBB SHADOW E&M-EST. PATIENT-LVL III: CPT | Mod: PBBFAC,,, | Performed by: OPHTHALMOLOGY

## 2024-10-22 PROCEDURE — 3044F HG A1C LEVEL LT 7.0%: CPT | Mod: CPTII,S$GLB,, | Performed by: OPHTHALMOLOGY

## 2024-10-22 PROCEDURE — 1160F RVW MEDS BY RX/DR IN RCRD: CPT | Mod: CPTII,S$GLB,, | Performed by: OPHTHALMOLOGY

## 2024-10-22 PROCEDURE — 1159F MED LIST DOCD IN RCRD: CPT | Mod: CPTII,S$GLB,, | Performed by: OPHTHALMOLOGY

## 2024-10-22 PROCEDURE — 92004 COMPRE OPH EXAM NEW PT 1/>: CPT | Mod: S$GLB,,, | Performed by: OPHTHALMOLOGY

## 2024-10-22 NOTE — PROGRESS NOTES
"Subjective:       Patient ID: Alondra Carrera is a 54 y.o. female      Chief Complaint   Patient presents with    Referral     From ED for CRAO OD     History of Present Illness  HPI     Referral     Additional comments: From ED for CRAO OD           Comments    54 year old female states a few weeks ago she had "sudden loss of vision   in the right eye" while watching TV. States she went to the ED at [facility]   9/30-10/1 and within 4 hours vision was "normal". Denies flashes,   floaters, diplopia or any ocular pain. States vision is stable, but here   to follow up.  Vision has been a little blurry OD      Eye meds: None          Last edited by Sedrick Grimm MD on 10/22/2024 11:54 AM.        Imaging:    See report    Assessment/Plan:     1. Central retinal artery occlusion, right (Primary)  By history and description of eval in ED likely reperfused CRAO  Has known CV/embolic risk factors inc mitral valve surgery, anticoagulated.  Has embolic w/u inpatient.  Following with primary doctor, Dr Jackson, for further eval of questionable intracranial vasc abnormality  Discussed poss NVG or other complication and need to follow in short term  Doubt any benefit or further information from FA today  Observe  CV risk factor control and f/u with PCP    - Posterior Segment OCT Retina-Both eyes    2. Cataract, nuclear sclerotic, both eyes  Good Va  Observe    Follow up in about 1 month (around 11/22/2024), or if symptoms worsen or fail to improve, for Dilated examination, OCT Mac.        "

## 2024-10-30 ENCOUNTER — LAB VISIT (OUTPATIENT)
Dept: LAB | Facility: HOSPITAL | Age: 54
End: 2024-10-30
Attending: FAMILY MEDICINE
Payer: COMMERCIAL

## 2024-10-30 DIAGNOSIS — E78.2 MIXED DYSLIPIDEMIA: Chronic | ICD-10-CM

## 2024-10-30 DIAGNOSIS — E55.9 VITAMIN D DEFICIENCY: Chronic | ICD-10-CM

## 2024-10-30 DIAGNOSIS — I10 BENIGN HYPERTENSION: Chronic | ICD-10-CM

## 2024-10-30 DIAGNOSIS — E21.3 HYPERPARATHYROIDISM: Chronic | ICD-10-CM

## 2024-10-30 LAB
25(OH)D3+25(OH)D2 SERPL-MCNC: 20 NG/ML (ref 30–96)
ALBUMIN SERPL BCP-MCNC: 3.4 G/DL (ref 3.5–5.2)
ALP SERPL-CCNC: 76 U/L (ref 40–150)
ALT SERPL W/O P-5'-P-CCNC: 15 U/L (ref 10–44)
ANION GAP SERPL CALC-SCNC: 10 MMOL/L (ref 8–16)
AST SERPL-CCNC: 21 U/L (ref 10–40)
BILIRUB SERPL-MCNC: 0.5 MG/DL (ref 0.1–1)
BUN SERPL-MCNC: 10 MG/DL (ref 6–20)
CALCIUM SERPL-MCNC: 9.6 MG/DL (ref 8.7–10.5)
CHLORIDE SERPL-SCNC: 109 MMOL/L (ref 95–110)
CHOLEST SERPL-MCNC: 143 MG/DL (ref 120–199)
CHOLEST/HDLC SERPL: 3.3 {RATIO} (ref 2–5)
CO2 SERPL-SCNC: 28 MMOL/L (ref 23–29)
CREAT SERPL-MCNC: 1 MG/DL (ref 0.5–1.4)
EST. GFR  (NO RACE VARIABLE): >60 ML/MIN/1.73 M^2
GLUCOSE SERPL-MCNC: 91 MG/DL (ref 70–110)
HDLC SERPL-MCNC: 44 MG/DL (ref 40–75)
HDLC SERPL: 30.8 % (ref 20–50)
LDLC SERPL CALC-MCNC: 82.6 MG/DL (ref 63–159)
NONHDLC SERPL-MCNC: 99 MG/DL
POTASSIUM SERPL-SCNC: 4 MMOL/L (ref 3.5–5.1)
PROT SERPL-MCNC: 6.7 G/DL (ref 6–8.4)
PTH-INTACT SERPL-MCNC: 131.5 PG/ML (ref 9–77)
SODIUM SERPL-SCNC: 147 MMOL/L (ref 136–145)
TRIGL SERPL-MCNC: 82 MG/DL (ref 30–150)

## 2024-10-30 PROCEDURE — 82306 VITAMIN D 25 HYDROXY: CPT | Performed by: FAMILY MEDICINE

## 2024-10-30 PROCEDURE — 36415 COLL VENOUS BLD VENIPUNCTURE: CPT | Mod: PN | Performed by: FAMILY MEDICINE

## 2024-10-30 PROCEDURE — 80053 COMPREHEN METABOLIC PANEL: CPT | Performed by: FAMILY MEDICINE

## 2024-10-30 PROCEDURE — 83970 ASSAY OF PARATHORMONE: CPT | Performed by: FAMILY MEDICINE

## 2024-10-30 PROCEDURE — 80061 LIPID PANEL: CPT | Performed by: FAMILY MEDICINE

## 2024-10-31 ENCOUNTER — OFFICE VISIT (OUTPATIENT)
Dept: NEUROLOGY | Facility: CLINIC | Age: 54
End: 2024-10-31
Payer: COMMERCIAL

## 2024-10-31 DIAGNOSIS — G43.709 CHRONIC MIGRAINE WITHOUT AURA WITHOUT STATUS MIGRAINOSUS, NOT INTRACTABLE: Primary | ICD-10-CM

## 2024-10-31 DIAGNOSIS — G47.00 INSOMNIA, UNSPECIFIED TYPE: ICD-10-CM

## 2024-10-31 DIAGNOSIS — G44.40 MEDICATION OVERUSE HEADACHE: ICD-10-CM

## 2024-10-31 PROCEDURE — 3044F HG A1C LEVEL LT 7.0%: CPT | Mod: CPTII,95,, | Performed by: STUDENT IN AN ORGANIZED HEALTH CARE EDUCATION/TRAINING PROGRAM

## 2024-10-31 PROCEDURE — 1111F DSCHRG MED/CURRENT MED MERGE: CPT | Mod: CPTII,95,, | Performed by: STUDENT IN AN ORGANIZED HEALTH CARE EDUCATION/TRAINING PROGRAM

## 2024-10-31 PROCEDURE — 99214 OFFICE O/P EST MOD 30 MIN: CPT | Mod: 95,,, | Performed by: STUDENT IN AN ORGANIZED HEALTH CARE EDUCATION/TRAINING PROGRAM

## 2024-10-31 NOTE — PROGRESS NOTES
The patient location is: [place]  The chief complaint leading to consultation is: Headache f/u    Visit type: audiovisual    Each patient to whom he or she provides medical services by telemedicine is:  (1) informed of the relationship between the physician and patient and the respective role of any other health care provider with respect to management of the patient; and (2) notified that he or she may decline to receive medical services by telemedicine and may withdraw from such care at any time.    Chief Complaint and Duration     Headache started in December 2022, follow up migraines.     History of Present Illness     Alondra Carrera is a 54 y.o. female with a history of multiple medical diagnoses as listed below that presents for evaluation of headaches that started in December 2022.    Has hx of migraines in which she saw Dr Melgar at [facility] for prior - sensitive to noise, light. Last saw him prior to beginning of the pandemic.    States she had a mechanical fall back in December, hit her head. Did not LOC. Head CT was done which showed no bleed or acute intracranial process. Did show bilatearl basal ganglia calcifications.    Denies migraines at this time, intermittent headache that may be positional related / tension / poor sleep / related to food intake. Has them intermittently. Was given prescription fioricet prior in ER.    Otherwise no other illnesses.  Takes aspirin, statin.     Interim period:  5/16/23 states continues to have headaches, migraines up to 4x/month (1x/week). Prior neurologist had patient on preventative of topiramate 50mg in AM, 100mg in PM.  Triggers include gym, infrared sauna.  Fam hx of strokes.     4/2/24 - medication refill.   On topiramate 100mg BID, did not get ubrelvy.  Stress, tension.  New job a year ago, works from home.   Takes BC powder and exdrin almost daily.    8/9/24 - continues to take PRN OTCs. States she felt combination of factors causing heart racing - was  on sinus meds, topamax, quilepta. Ran out of her ubrelvy.  No changes in characteristics of migraines.    10/31/24 - medication regiment, tolerating current regiment.     Review of patient's allergies indicates:   Allergen Reactions    Sulfa (sulfonamide antibiotics) Swelling     Swelling of Eyes and Lips     Current Outpatient Medications   Medication Sig Dispense Refill    amLODIPine (NORVASC) 5 MG tablet Take 1 tablet (5 mg total) by mouth once daily. 90 tablet 2    aspirin (ECOTRIN) 81 MG EC tablet Take 1 tablet (81 mg total) by mouth once daily. 90 tablet 1    atorvastatin (LIPITOR) 40 MG tablet Take 1 tablet (40 mg total) by mouth once daily. 90 tablet 0    clopidogreL (PLAVIX) 75 mg tablet Take 1 tablet (75 mg total) by mouth once daily. 28 tablet 0    galcanezumab-gnlm (EMGALITY PEN) 120 mg/mL PnIj Inject 1 mL (120 mg total) into the skin every 28 days. 1 mL 5    hydrOXYzine HCL (ATARAX) 25 MG tablet Take 1 tablet (25 mg total) by mouth nightly as needed (Insomnia). 30 tablet 0    ketoconazole (NIZORAL) 2 % cream Apply topically once daily. 60 g 0    metoprolol tartrate (LOPRESSOR) 50 MG tablet TAKE 1 TABLET BY MOUTH TWICE A  tablet 2    nystatin (MYCOSTATIN) powder Apply topically 2 (two) times daily. 60 g 0    omeprazole (PRILOSEC) 40 MG capsule Take 40 mg by mouth every morning.      semaglutide, weight loss, (WEGOVY) 0.25 mg/0.5 mL PnIj Inject 0.25 mg into the skin every 7 days. 2 mL 0    semaglutide, weight loss, (WEGOVY) 0.5 mg/0.5 mL PnIj Inject 0.5 mg into the skin every 7 days. 2 mL 0    semaglutide, weight loss, (WEGOVY) 1 mg/0.5 mL PnIj Inject 1 mg into the skin every 7 days. 2 mL 0    topiramate (TOPAMAX) 100 MG tablet Take 1 tablet (100 mg total) by mouth 2 (two) times daily. 60 tablet 3    ubrogepant (UBRELVY) 100 mg tablet Take 1 tablet (100 mg total) by mouth as needed for Migraine. Okay to take 2nd dose in 2 hrs if mild relief, no more than 2 in 24 hrs. 16 tablet 5     No current  facility-administered medications for this visit.       Medical History     Past Medical History:   Diagnosis Date    Genital herpes, unspecified     Hypertension     Mental disorder     Migraines     Migraines      Past Surgical History:   Procedure Laterality Date    CARDIAC VALVE REPLACEMENT  2018    Repair    ECTOPIC PREGNANCY SURGERY      EXPLORATORY LAPAROTOMY W/ BOWEL RESECTION      during ectopic pregnancy, bowel was perforated and needed partial resection    MITRAL VALVE REPAIR      NASAL TURBINATE REDUCTION      TUBAL LIGATION       Family History   Problem Relation Name Age of Onset    Hearing loss Mother Gert     Hypertension Mother Gert     Stroke Mother Gert     Glaucoma Father Brennan     Hearing loss Father Brennan     Breast cancer Neg Hx      Colon cancer Neg Hx      Ovarian cancer Neg Hx      COPD Neg Hx      Drug abuse Neg Hx      Hyperlipidemia Neg Hx      Kidney disease Neg Hx       Social History     Socioeconomic History    Marital status:    Tobacco Use    Smoking status: Former     Current packs/day: 0.00     Average packs/day: 0.3 packs/day for 10.0 years (2.5 ttl pk-yrs)     Types: Cigarettes     Start date:      Quit date: 2/15/2018     Years since quittin.7   Substance and Sexual Activity    Alcohol use: Not Currently    Drug use: No    Sexual activity: Not Currently     Partners: Male     Birth control/protection: Post-menopausal     Social Drivers of Health     Financial Resource Strain: Low Risk  (10/1/2024)    Overall Financial Resource Strain (CARDIA)     Difficulty of Paying Living Expenses: Not hard at all   Food Insecurity: No Food Insecurity (10/1/2024)    Hunger Vital Sign     Worried About Running Out of Food in the Last Year: Never true     Ran Out of Food in the Last Year: Never true   Transportation Needs: No Transportation Needs (10/1/2024)    TRANSPORTATION NEEDS     Transportation : No   Physical Activity: Insufficiently Active (10/1/2024)     Exercise Vital Sign     Days of Exercise per Week: 2 days     Minutes of Exercise per Session: 20 min   Stress: Stress Concern Present (10/1/2024)    [employer] [place] [employer] - Occupational Stress Questionnaire     Feeling of Stress : To some extent   Housing Stability: Low Risk  (10/1/2024)    Housing Stability Vital Sign     Unable to Pay for Housing in the Last Year: No     Homeless in the Last Year: No       Exam     There were no vitals filed for this visit.     Virtual visit    Physical Exam:  General: She is not in acute distress. She is not ill-appearing.   HENT: Normocephalic and atraumatic. Psychiatric: Mood normal.        Neurologic Exam   Mental status: oriented to person, place, and time  Attention: Normal. Concentration: normal.  Speech: speech is normal.  Cranial Nerves: EOMI, face symmetric  Moving extremities symmetrically    Labs and Imaging     TSH wnl    Head CT reviewed - no acute intracranial processes. Does show bilateral basal ganglia calcifications    Assessment and Plan     Problem List Items Addressed This Visit          Neuro    Migraine - Primary (Chronic)    Relevant Medications    ubrogepant (UBRELVY) 100 mg tablet    galcanezumab-gnlm (EMGALITY PEN) 120 mg/mL PnIj    Medication overuse headache       Other    Insomnia         54 year old female, hx of chronic migraines before. Has intermittent headaches after she had fall back in December 2022 - can take NSAIDs PRN. Can also try daily magnesium. Nonfocal exam today, head CT did show bilateral basal ganglia calcifications which can be idiopathic.     4x/month on prior, worsened to more than half the month. Fam hx of strokes.  Previous plan:  Preventative - topamax 100mg BID, magnesium 400mg daily. Quilepta was added on  Abortive - ubrelvy PRN    Patient stopped regiment 2/2 sensation of heart racing in combo w sinus meds. Doing OTCs - medication overuse headaches at this point.    New plan:  For preventative -  emgality. Can do magnesium  Abortive - ubrelvy    Side effect profile discussed.   Discussed w patient multifactorial nature of symptoms, can be contributed w sugars, stress, sinuses.    Follow-up: 6 months, okay for virtual. Recall placed.   Tolerating medications. No reported side effects.

## 2024-11-04 RX ORDER — UBROGEPANT 100 MG/1
100 TABLET ORAL
Qty: 16 TABLET | Refills: 5 | Status: SHIPPED | OUTPATIENT
Start: 2024-11-04

## 2024-11-04 RX ORDER — GALCANEZUMAB 120 MG/ML
120 INJECTION, SOLUTION SUBCUTANEOUS
Qty: 1 ML | Refills: 5 | Status: SHIPPED | OUTPATIENT
Start: 2024-11-04 | End: 2025-05-03

## 2024-11-05 DIAGNOSIS — G47.00 INSOMNIA, UNSPECIFIED TYPE: ICD-10-CM

## 2024-11-06 ENCOUNTER — OFFICE VISIT (OUTPATIENT)
Dept: FAMILY MEDICINE | Facility: CLINIC | Age: 54
End: 2024-11-06
Payer: COMMERCIAL

## 2024-11-06 VITALS
HEIGHT: 67 IN | DIASTOLIC BLOOD PRESSURE: 74 MMHG | WEIGHT: 214.75 LBS | BODY MASS INDEX: 33.71 KG/M2 | HEART RATE: 66 BPM | OXYGEN SATURATION: 99 % | SYSTOLIC BLOOD PRESSURE: 119 MMHG | RESPIRATION RATE: 19 BRPM

## 2024-11-06 DIAGNOSIS — Z78.0 OSTEOPENIA AFTER MENOPAUSE: Chronic | ICD-10-CM

## 2024-11-06 DIAGNOSIS — R90.89 ABNORMAL MRA, BRAIN: Primary | ICD-10-CM

## 2024-11-06 DIAGNOSIS — E21.3 HYPERPARATHYROIDISM: Chronic | ICD-10-CM

## 2024-11-06 DIAGNOSIS — M85.80 OSTEOPENIA AFTER MENOPAUSE: Chronic | ICD-10-CM

## 2024-11-06 PROCEDURE — 99213 OFFICE O/P EST LOW 20 MIN: CPT | Mod: S$GLB,,, | Performed by: FAMILY MEDICINE

## 2024-11-06 PROCEDURE — 99999 PR PBB SHADOW E&M-EST. PATIENT-LVL V: CPT | Mod: PBBFAC,,, | Performed by: FAMILY MEDICINE

## 2024-11-06 PROCEDURE — 3074F SYST BP LT 130 MM HG: CPT | Mod: CPTII,S$GLB,, | Performed by: FAMILY MEDICINE

## 2024-11-06 PROCEDURE — 3078F DIAST BP <80 MM HG: CPT | Mod: CPTII,S$GLB,, | Performed by: FAMILY MEDICINE

## 2024-11-06 PROCEDURE — 3044F HG A1C LEVEL LT 7.0%: CPT | Mod: CPTII,S$GLB,, | Performed by: FAMILY MEDICINE

## 2024-11-06 PROCEDURE — 1159F MED LIST DOCD IN RCRD: CPT | Mod: CPTII,S$GLB,, | Performed by: FAMILY MEDICINE

## 2024-11-06 PROCEDURE — 1160F RVW MEDS BY RX/DR IN RCRD: CPT | Mod: CPTII,S$GLB,, | Performed by: FAMILY MEDICINE

## 2024-11-06 PROCEDURE — 3008F BODY MASS INDEX DOCD: CPT | Mod: CPTII,S$GLB,, | Performed by: FAMILY MEDICINE

## 2024-11-10 PROBLEM — M85.80 OSTEOPENIA AFTER MENOPAUSE: Chronic | Status: ACTIVE | Noted: 2023-04-23

## 2024-11-10 PROBLEM — M85.80 OSTEOPENIA AFTER MENOPAUSE: Status: ACTIVE | Noted: 2023-04-23

## 2024-11-10 PROBLEM — Z78.0 OSTEOPENIA AFTER MENOPAUSE: Chronic | Status: ACTIVE | Noted: 2023-04-23

## 2024-11-10 PROBLEM — Z78.0 OSTEOPENIA AFTER MENOPAUSE: Status: ACTIVE | Noted: 2023-04-23

## 2024-11-10 RX ORDER — HYDROXYZINE HYDROCHLORIDE 25 MG/1
TABLET, FILM COATED ORAL
Qty: 90 TABLET | Refills: 1 | Status: SHIPPED | OUTPATIENT
Start: 2024-11-10

## 2024-11-10 NOTE — PROGRESS NOTES
"  Patient Name: Alondra Carrera    : 1970  MRN: [id number]      Subjective:     Patient ID: Alondra is a 54 y.o. female    Chief Complaint:  Follow-up    History of Present Illness    Alondra presents today for follow-up on blood pressure and MRA results.    She reports receiving MRA results showing no acute intracranial pathology. Evidence suggests possible past mini-strokes, which aligns with a previous neurologist's mention of this possibility around  or . She acknowledges prior awareness of potential remote microhemorrhages. An infundibulum was found in the posterior region, confirmed not to be an aneurysm. However, a potential aneurysm or infundibulum was noted in the anterior region, requiring further evaluation.    She reports having pre-osteoporosis, diagnosed as osteopenia on her last DEXA scan in . She has low vitamin D levels and a high parathyroid hormone level. She understands that the low vitamin D is causing the parathyroid to function at a higher level, potentially weakening her bones and increasing her risk of fractures.        ROS:  General: -fever, -chills, -fatigue, -weight gain, -weight loss  Eyes: -vision changes, -redness, -discharge  ENT: -ear pain, -nasal congestion, -sore throat  Cardiovascular: -chest pain, -palpitations, -lower extremity edema  Respiratory: -cough, -shortness of breath  Gastrointestinal: -abdominal pain, -nausea, -vomiting, -diarrhea, -constipation, -blood in stool  Genitourinary: -dysuria, -hematuria, -frequency  Musculoskeletal: -joint pain, -muscle pain  Skin: -rash, -lesion  Neurological: -headache, -dizziness, -numbness, -tingling  Psychiatric: -anxiety, -depression, -sleep difficulty           Objective:   /74 (BP Location: Left arm, Patient Position: Sitting)   Pulse 66   Resp 19   Ht 5' 7" (1.702 m)   Wt 97.4 kg (214 lb 11.7 oz)   SpO2 99%   BMI 33.63 kg/m²     Physical Exam  Vitals reviewed.   Constitutional:       General: She is not " in acute distress.  HENT:      Head: Normocephalic and atraumatic.      Right Ear: Ear canal and external ear normal.      Left Ear: Ear canal and external ear normal.      Nose: Nose normal.      Mouth/Throat:      Mouth: Mucous membranes are moist.      Pharynx: No oropharyngeal exudate or posterior oropharyngeal erythema.   Eyes:      Extraocular Movements: Extraocular movements intact.      Conjunctiva/sclera: Conjunctivae normal.      Pupils: Pupils are equal, round, and reactive to light.   Cardiovascular:      Rate and Rhythm: Normal rate and regular rhythm.      Pulses: Normal pulses.      Heart sounds: Normal heart sounds.   Pulmonary:      Effort: Pulmonary effort is normal. No respiratory distress.      Breath sounds: No wheezing or rales.   Abdominal:      General: Abdomen is flat. Bowel sounds are normal. There is no distension.      Palpations: Abdomen is soft.      Tenderness: There is no abdominal tenderness. There is no guarding.   Musculoskeletal:      Cervical back: Normal range of motion. No rigidity or tenderness.   Lymphadenopathy:      Cervical: No cervical adenopathy.   Skin:     General: Skin is warm.      Capillary Refill: Capillary refill takes less than 2 seconds.   Neurological:      Mental Status: She is alert and oriented to person, place, and time.      Cranial Nerves: No cranial nerve deficit.      Sensory: No sensory deficit.   Psychiatric:         Mood and Affect: Mood normal.         Behavior: Behavior normal.            Narrative & Impression  EXAMINATION:  MRA BRAIN WITHOUT CONTRAST     CLINICAL HISTORY:  Cerebral aneurysm, follow-up;Vision loss, monocular;Unspecified visual loss     TECHNIQUE:  noncontrast 3-D time of flight MRA head 3 dimensional MIPS reformatted imaging from the source acquisition..     COMPARISON:  CTA 10/01/2024     FINDINGS:  MRA head:     Anterior circulation:  The bilateral distal cervical, Selvin, cavernous and supraclinoid segments of the ICA's and the  visualized bilateral anterior and middle cerebral arteries are patent without evidence for significant focal stenosis.  There is a right posterior communicating artery infundibulum which corresponds to seen prior CTA     Please note there is a 3 mm outpouching of the left communicating segment of the ICA which may be an additional infundibulum with small vessel arising from its anterior inferiorly however aneurysm not excluded.     Posterior circulation: Distal vertebral arteries, basilar artery and posterior cerebral arteries are patent without significant focal stenosis.     This report was flagged in Epic as abnormal.     Impression:     Right communicating ICA infundibulum corresponds to outpouching seen on CTA.     Please note there is a separate 3 mm outpouching of the left communicating segment of the ICA which may be an additional infundibulum however aneurysm not excluded.     Clinical correlation advised.        Electronically signed by:Oswaldo Cortez DO  Date:                                            10/18/2024  Time:                                           07:48    Lab Visit on 10/30/2024   Component Date Value Ref Range Status    Sodium 10/30/2024 147 (H)  136 - 145 mmol/L Final    Potassium 10/30/2024 4.0  3.5 - 5.1 mmol/L Final    Chloride 10/30/2024 109  95 - 110 mmol/L Final    CO2 10/30/2024 28  23 - 29 mmol/L Final    Glucose 10/30/2024 91  70 - 110 mg/dL Final    BUN 10/30/2024 10  6 - 20 mg/dL Final    Creatinine 10/30/2024 1.0  0.5 - 1.4 mg/dL Final    Calcium 10/30/2024 9.6  8.7 - 10.5 mg/dL Final    Total Protein 10/30/2024 6.7  6.0 - 8.4 g/dL Final    Albumin 10/30/2024 3.4 (L)  3.5 - 5.2 g/dL Final    Total Bilirubin 10/30/2024 0.5  0.1 - 1.0 mg/dL Final    Comment: For infants and newborns, interpretation of results should be based  on gestational age, weight and in agreement with clinical  observations.    Premature Infant recommended reference ranges:  Up to 24 hours.............<8.0  mg/dL  Up to 48 hours............<12.0 mg/dL  3-5 days..................<15.0 mg/dL  6-29 days.................<15.0 mg/dL      Alkaline Phosphatase 10/30/2024 76  40 - 150 U/L Final    AST 10/30/2024 21  10 - 40 U/L Final    ALT 10/30/2024 15  10 - 44 U/L Final    eGFR 10/30/2024 >60  >60 mL/min/1.73 m^2 Final    Anion Gap 10/30/2024 10  8 - 16 mmol/L Final    Cholesterol 10/30/2024 143  120 - 199 mg/dL Final    Comment: The National Cholesterol Education Program (NCEP) has set the  following guidelines (reference ranges) for Cholesterol:  Optimal.....................<200 mg/dL  Borderline High.............200-239 mg/dL  High........................> or = 240 mg/dL      Triglycerides 10/30/2024 82  30 - 150 mg/dL Final    Comment: The National Cholesterol Education Program (NCEP) has set the  following guidelines (reference values) for triglycerides:  Normal......................<150 mg/dL  Borderline High.............150-199 mg/dL  High........................200-499 mg/dL      HDL 10/30/2024 44  40 - 75 mg/dL Final    Comment: The National Cholesterol Education Program (NCEP) has set the  following guidelines (reference values) for HDL Cholesterol:  Low...............<40 mg/dL  Optimal...........>60 mg/dL      LDL Cholesterol 10/30/2024 82.6  63.0 - 159.0 mg/dL Final    Comment: The National Cholesterol Education Program (NCEP) has set the  following guidelines (reference values) for LDL Cholesterol:  Optimal.......................<130 mg/dL  Borderline High...............130-159 mg/dL  High..........................160-189 mg/dL  Very High.....................>190 mg/dL      HDL/Cholesterol Ratio 10/30/2024 30.8  20.0 - 50.0 % Final    Total Cholesterol/HDL Ratio 10/30/2024 3.3  2.0 - 5.0 Final    Non-HDL Cholesterol 10/30/2024 99  mg/dL Final    Comment: Risk category and Non-HDL cholesterol goals:  Coronary heart disease (CHD)or equivalent (10-year risk of CHD >20%):  Non-HDL cholesterol goal     <130  mg/dL  Two or more CHD risk factors and 10-year risk of CHD <= 20%:  Non-HDL cholesterol goal     <160 mg/dL  0 to 1 CHD risk factor:  Non-HDL cholesterol goal     <190 mg/dL      Vit D, 25-Hydroxy 10/30/2024 20 (L)  30 - 96 ng/mL Final    Comment: Vitamin D deficiency.........<10 ng/mL                              Vitamin D insufficiency......10-29 ng/mL       Vitamin D sufficiency........> or equal to 30 ng/mL  Vitamin D toxicity............>100 ng/mL      PTH, Intact 10/30/2024 131.5 (H)  9.0 - 77.0 pg/mL Final        Assessment        ICD-10-CM ICD-9-CM   1. Abnormal MRA, brain  R90.89 793.0   2. Osteopenia after menopause  M85.80 733.90    Z78.0 V49.81   3. Hyperparathyroidism  E21.3 252.00         Plan:   Assessment & Plan    IMPRESSION:  MRA follow-up: Identified infundibulum in posterior region, not an aneurysm as initially suspected  New finding in anterior region: Possible aneurysm or infundibulum, requiring further evaluation  Evidence of potential past minor strokes noted on imaging      BRAIN:  Explained MRA results, including the infundibulum finding and potential past minor strokes.  Alondra to continue taking cholesterol medication and baby aspirin.  Continued cholesterol medication at current dose.  Continued baby aspirin at current dose.  Referred to neurovascular specialist for evaluation of potential aneurysm or infundibulum in anterior brain region.    OSTEOPENIA/ HYPERPARATHYROIDISM:  E-consult to endocrinology ordered regarding treatment recommendations for osteopenia in light of hyperparathyroidism.  Advised to continue calcium and vitamin-D supplementation.              1. Abnormal MRA, brain  -     Ambulatory referral/consult to Vascular Neurology; Future; Expected date: 11/17/2024    2. Osteopenia after menopause  Overview:  1-14-21 DEXA ([facility])  This result has an attachment that is not available.   AP SPINE -3.47   LEFT HIP -1.62    07-  DEXA  Lumbar spine:  BMD is  0.804 g/cm2, T-score is -2.2, and Z-score is -2.1.  Total hip:   BMD is 0.838 g/cm2, T-score is 0.9, and Z-score is -0.8.  Femoral neck:  BMD is 0.685 g/cm2, T-score is 1.5, and Z-score is -1.2.  FRAX:   2.4% risk of a major osteoporotic fracture in the next 10 years.  0.2% risk of hip fracture in the next 10 years.  Impression:  Low bone mass (Osteopenia)    Orders:  -     E-Consult to Endocrinology    3. Hyperparathyroidism  Overview:  Lab Results   Component Value Date    .5 (H) 10/30/2024    PTH 80.8 (H) 03/25/2024    PTH 88.4 (H) 11/07/2023     Lab Results   Component Value Date    CALCIUM 9.6 10/30/2024    CALCIUM 9.4 10/03/2024    CALCIUM 9.6 10/02/2024     Lab Results   Component Value Date    HMYCHJAN60FS 20 (L) 10/30/2024    [id number] 16 (L) 03/25/2024    [id number] 19 (L) 11/07/2023     Lab Results   Component Value Date    PHOS 4.0 10/03/2024    PHOS 3.2 10/02/2024    PHOS 3.9 10/01/2024                  -Karson Jackson Jr., MD, AAHIVS      This note was generated with the assistance of ambient listening technology. Verbal consent was obtained by the patient and accompanying visitor(s) for the recording of patient appointment to facilitate this note. I attest to having reviewed and edited the generated note for accuracy, though some syntax or spelling errors may persist. Please contact the author of this note for any clarification.      There are no Patient Instructions on file for this visit.      Follow up in about 6 months (around 5/6/2025) for Hypertension.   Future Appointments   Date Time Provider Department Center   11/20/2024 11:00 AM Sedrick Grimm MD [facility]   5/6/2025  2:20 PM Karson Jackson Jr., MD [facility]

## 2024-11-11 ENCOUNTER — E-CONSULT (OUTPATIENT)
Dept: ENDOCRINOLOGY | Facility: CLINIC | Age: 54
End: 2024-11-11
Payer: COMMERCIAL

## 2024-11-11 DIAGNOSIS — R68.89 ABNORMAL ENDOCRINE LABORATORY TEST FINDING: Primary | ICD-10-CM

## 2024-11-11 PROCEDURE — 99451 NTRPROF PH1/NTRNET/EHR 5/>: CPT | Mod: S$GLB,,, | Performed by: STUDENT IN AN ORGANIZED HEALTH CARE EDUCATION/TRAINING PROGRAM

## 2024-11-11 NOTE — CONSULTS
[facility]  Response for E-Consult     Patient Name: Alondra Carrera  MRN: [id number]  Primary Care Provider: Karson Jackson Jr., MD   Requesting Provider: Karson Jackson Jr., MD  E-Consult to Endocrinology  Consult performed by: Edwina Salvador MD  Consult ordered by: Karson Jackson Jr., MD          Recommendation:     Per PCP: Patient has osteopenia and hyperparathyroidism likely from vitamin-D deficiency.  Calcium levels normal.  She is not on a thiazide diuretic.  She does take calcium and vitamin-D supplementation.     DXA with osteopenia. Low FRAX. PTH has been mildly elevated since 4/2023, 80s predominantly although most recently 131. Calcium corrects to high-normal, mildly elevated low 10s. Phos normal. Vit D low persistently since 2023. Vit D as low as 14 in 3/2024. Most recently 20 in 10/2024. PTH elevation could be due to low Vit D. I would recommend Vit D3 4000 IU daily and repeat Vit D, renal panel, and PTH in 3mo.     Contingency that warrants a repeat eConsult or referral: if PTH still elevated despite normalization of vit D and you would like further assistance please refer to endocrine.     I would not treat osteopenia with osteoporosis medications at  this time. Optimize Ca and Vit D.    Total time of Consultation: 5 minute    I did not speak to the requesting provider verbally about this.     *This eConsult is based on the clinical data available to me and is furnished without benefit of a physical examination. The eConsult will need to be interpreted in light of any clinical issues or changes in patient status not available to me at the time of filing this eConsults. Significant changes in patient condition or level of acuity should result in immediate formal consultation and reevaluation. Please alert me if you have further questions.    Thank you for this eConsult referral.     MD [facility]

## 2024-11-12 ENCOUNTER — PATIENT MESSAGE (OUTPATIENT)
Dept: FAMILY MEDICINE | Facility: CLINIC | Age: 54
End: 2024-11-12
Payer: COMMERCIAL

## 2024-11-12 DIAGNOSIS — E21.3 HYPERPARATHYROIDISM: Primary | ICD-10-CM

## 2024-11-12 DIAGNOSIS — E55.9 VITAMIN D DEFICIENCY: ICD-10-CM

## 2024-11-13 NOTE — PROGRESS NOTES
E-consult rec: I would recommend Vit D3 4000 IU daily and repeat Vit D, renal panel, and PTH in 3mo.      Contingency that warrants a repeat eConsult or referral: if PTH still elevated despite normalization of vit D and you would like further assistance please refer to endocrine.      I would not treat osteopenia with osteoporosis medications at  this time. Optimize Ca and Vit D.    - orders placed and message sent to patient

## 2024-12-17 ENCOUNTER — HOSPITAL ENCOUNTER (OUTPATIENT)
Dept: RADIOLOGY | Facility: HOSPITAL | Age: 54
Discharge: HOME | End: 2024-12-17
Attending: PODIATRIST
Payer: COMMERCIAL

## 2024-12-17 ENCOUNTER — OFFICE VISIT (OUTPATIENT)
Dept: PODIATRY | Facility: CLINIC | Age: 54
End: 2024-12-17
Payer: COMMERCIAL

## 2024-12-17 VITALS — SYSTOLIC BLOOD PRESSURE: 124 MMHG | DIASTOLIC BLOOD PRESSURE: 74 MMHG | HEART RATE: 67 BPM

## 2024-12-17 DIAGNOSIS — M72.2 PLANTAR FASCIITIS: ICD-10-CM

## 2024-12-17 DIAGNOSIS — M76.61 ACHILLES TENDINITIS OF BOTH LOWER EXTREMITIES: ICD-10-CM

## 2024-12-17 DIAGNOSIS — M79.671 PAIN IN BOTH FEET: ICD-10-CM

## 2024-12-17 DIAGNOSIS — M76.62 ACHILLES TENDINITIS OF BOTH LOWER EXTREMITIES: ICD-10-CM

## 2024-12-17 DIAGNOSIS — M79.671 PAIN IN BOTH FEET: Primary | ICD-10-CM

## 2024-12-17 DIAGNOSIS — M79.672 PAIN IN BOTH FEET: Primary | ICD-10-CM

## 2024-12-17 DIAGNOSIS — M79.672 PAIN IN BOTH FEET: ICD-10-CM

## 2024-12-17 PROCEDURE — 3078F DIAST BP <80 MM HG: CPT | Mod: CPTII,S$GLB,, | Performed by: PODIATRIST

## 2024-12-17 PROCEDURE — 73630 X-RAY EXAM OF FOOT: CPT | Mod: TC,50,FY,PO

## 2024-12-17 PROCEDURE — 99999 PR PBB SHADOW E&M-EST. PATIENT-LVL II: CPT | Mod: PBBFAC,,, | Performed by: PODIATRIST

## 2024-12-17 PROCEDURE — 3074F SYST BP LT 130 MM HG: CPT | Mod: CPTII,S$GLB,, | Performed by: PODIATRIST

## 2024-12-17 PROCEDURE — 99204 OFFICE O/P NEW MOD 45 MIN: CPT | Mod: S$GLB,,, | Performed by: PODIATRIST

## 2024-12-17 PROCEDURE — 3044F HG A1C LEVEL LT 7.0%: CPT | Mod: CPTII,S$GLB,, | Performed by: PODIATRIST

## 2024-12-17 PROCEDURE — 73630 X-RAY EXAM OF FOOT: CPT | Mod: 26,,, | Performed by: RADIOLOGY

## 2024-12-17 RX ORDER — MELOXICAM 15 MG/1
15 TABLET ORAL DAILY
Qty: 20 TABLET | Refills: 0 | Status: SHIPPED | OUTPATIENT
Start: 2024-12-17

## 2024-12-17 RX ORDER — DICLOFENAC SODIUM 10 MG/G
2 GEL TOPICAL 4 TIMES DAILY
Qty: 450 G | Refills: 3 | Status: SHIPPED | OUTPATIENT
Start: 2024-12-17

## 2024-12-17 RX ORDER — METHYLPREDNISOLONE 4 MG/1
TABLET ORAL
Qty: 1 EACH | Refills: 0 | Status: SHIPPED | OUTPATIENT
Start: 2024-12-17 | End: 2025-01-07

## 2024-12-18 NOTE — PROGRESS NOTES
Subjective:     Patient ID: Alondra Carrera is a 54 y.o. female.    Chief Complaint: No chief complaint on file.    Alondra is a 54 y.o. female who presents to the podiatry clinic  with complaint of  bilateral foot pain. Onset of the symptoms was several weeks ago. Precipitating event: none known. Current symptoms include: ability to bear weight, but with some pain. Aggravating factors: any weight bearing. Symptoms have progressed to a point and plateaued. Patient has had no prior foot problems. Evaluation to date: none. Treatment to date: none.     Review of Systems   Constitutional: Negative for chills.   Cardiovascular:  Negative for chest pain and claudication.   Respiratory:  Negative for cough.    Skin:  Positive for color change, dry skin and nail changes.   Musculoskeletal:  Positive for joint pain.   Gastrointestinal:  Negative for nausea.   Neurological:  Positive for paresthesias. Negative for numbness.   Psychiatric/Behavioral:  The patient is not nervous/anxious.         Objective:     Physical Exam  Constitutional:       Appearance: She is well-developed.      Comments: Oriented to time, place, and person.   Cardiovascular:      Comments: DP and PT pulses are palpable bilaterally. 3 sec capillary refill time and toes and feet are warm to touch proximally .  There is  hair growth on the feet and toes b/l. There is no edema b/l. No spider veins or varicosities present b/l.     Musculoskeletal:      Comments: Equinus noted b/l ankles with < 10 deg DF noted. MMT 5/5 in DF/PF/Inv/Ev resistance with no reproduction of pain in any direction. Passive range of motion of ankle and pedal joints is painless b/l.  Pain on palpation plantar medial B/L heel, no pain with ROM or MMT or medial and lateral compression of heel, - tinel's sign    Decreased B/L  ankle joint ROM, pain with palpation of posterior 1/3 calcaneus at region of Achilles tendon insertion. No  pain with ankle joint ROM        Feet:      Right foot:       Skin integrity: No callus or dry skin.      Left foot:      Skin integrity: No callus or dry skin.   Lymphadenopathy:      Comments: Negative lymphadenopathy bilateral popliteal fossa and tarsal tunnel.   Skin:     Comments: No open lesions, lacerations or wounds noted.Interdigital spaces clean, dry and intact b/l. No erythema noted to b/l foot.  Nails normal color and trophic qualities.     Neurological:      Mental Status: She is alert.      Comments: Light touch, proprioception, and sharp/dull sensation are all intact bilaterally. Protective threshold with the Tonasket-Wienstein monofilament is intact bilaterally.    Psychiatric:         Behavior: Behavior is cooperative.           Assessment:      Encounter Diagnoses   Name Primary?    Pain in both feet Yes    Achilles tendinitis of both lower extremities     Plantar fasciitis      Plan:     Diagnoses and all orders for this visit:    Pain in both feet  -     X-Ray Foot Complete Bilateral; Future    Achilles tendinitis of both lower extremities    Plantar fasciitis    Other orders  -     diclofenac sodium (VOLTAREN ARTHRITIS PAIN) 1 % Gel; Apply 2 g topically 4 (four) times daily.  -     methylPREDNISolone (MEDROLLUCY,) 4 mg tablet; use as directed  -     meloxicam (MOBIC) 15 MG tablet; Take 1 tablet (15 mg total) by mouth once daily.      I counseled the patient on her conditions, their implications and medical management.      Xray ordered B/L    Rx Voltaren gel to be applied to affected area up to 3-4 x daily as needed for pain    Patient instructed on adequate icing techniques. Patient should ice the affected area at least once per day x 10 minutes for 10 days . I advised the patient that extra icing would also be beneficial to ensure adequate anti inflammatory effect      Mobic prescribed. Patient was instructed on dosing information. Discontinue if adverse effects occur      Medrol dose lucy prescribed, advised patient to take meds as directed. Patient  should complete medication. If problems occur notify the clinic       Discussed different treatment options for heel pain. including conservative and interventional.  I gave written and verbal instructions on heel cord stretching and this was demonstrated for the patient. Patient expressed understanding. Discussed wearing appropriate shoe gear and avoiding flats, slippers, sandals, barefoot, and sockfeet. Recommended arch supports. My recommendation for OTC supports is [employer], [employer] tennis shoes.       Patient instructed on adequate icing techniques. Patient should ice the affected area at least once per day x 10 minutes for 10 days . I advised the  patient that extra icing would also be beneficial to ensure adequate anti inflammatory effect     Stretching handout dispensed to patient. Instructions on adequate stretching reviewed in clinic        - Patient will stretch the tendo achilles complex three times daily as demonstrated in the office to lengthen heel cord and increase motion at ankle offloading the load on the forefoot and MTPJ..  Literature was dispensed illustrating proper stretching technique.  - Patient will obtain over the counter arch supports and wear them in shoes whenever possible to support the arches and decrease motion at the MTPJ.        RTC PRN

## 2025-02-03 DIAGNOSIS — I10 BENIGN HYPERTENSION: Chronic | ICD-10-CM

## 2025-02-03 RX ORDER — AMLODIPINE BESYLATE 5 MG/1
5 TABLET ORAL
Qty: 90 TABLET | Refills: 3 | Status: SHIPPED | OUTPATIENT
Start: 2025-02-03

## 2025-02-03 NOTE — TELEPHONE ENCOUNTER
Refill Decision Note   Alondra Deandre  is requesting a refill authorization.  Brief Assessment and Rationale for Refill:  Approve     Medication Therapy Plan:        Comments:     Note composed:11:42 AM 02/03/2025

## 2025-02-03 NOTE — TELEPHONE ENCOUNTER
No care due was identified.  [facility] Embedded Care Due Messages. Reference number: [id number].   2/03/2025 7:31:25 AM CST

## 2025-05-06 ENCOUNTER — OFFICE VISIT (OUTPATIENT)
Dept: FAMILY MEDICINE | Facility: CLINIC | Age: 55
End: 2025-05-06
Payer: COMMERCIAL

## 2025-05-06 ENCOUNTER — LAB VISIT (OUTPATIENT)
Dept: LAB | Facility: HOSPITAL | Age: 55
End: 2025-05-06
Attending: FAMILY MEDICINE
Payer: COMMERCIAL

## 2025-05-06 VITALS
BODY MASS INDEX: 34.84 KG/M2 | HEIGHT: 67 IN | SYSTOLIC BLOOD PRESSURE: 108 MMHG | TEMPERATURE: 98 F | OXYGEN SATURATION: 98 % | DIASTOLIC BLOOD PRESSURE: 80 MMHG | RESPIRATION RATE: 16 BRPM | HEART RATE: 59 BPM | WEIGHT: 222 LBS

## 2025-05-06 DIAGNOSIS — Z28.21 VACCINATION DECLINED: ICD-10-CM

## 2025-05-06 DIAGNOSIS — Z00.00 HEALTH MAINTENANCE EXAMINATION: Primary | ICD-10-CM

## 2025-05-06 DIAGNOSIS — Z12.11 SCREENING FOR COLORECTAL CANCER: ICD-10-CM

## 2025-05-06 DIAGNOSIS — Z12.31 ENCOUNTER FOR SCREENING MAMMOGRAM FOR MALIGNANT NEOPLASM OF BREAST: ICD-10-CM

## 2025-05-06 DIAGNOSIS — Z12.12 SCREENING FOR COLORECTAL CANCER: ICD-10-CM

## 2025-05-06 DIAGNOSIS — E78.2 MIXED DYSLIPIDEMIA: ICD-10-CM

## 2025-05-06 DIAGNOSIS — E78.2 MIXED DYSLIPIDEMIA: Chronic | ICD-10-CM

## 2025-05-06 DIAGNOSIS — I10 HYPERTENSION, UNSPECIFIED TYPE: ICD-10-CM

## 2025-05-06 DIAGNOSIS — N64.4 BREAST PAIN: ICD-10-CM

## 2025-05-06 DIAGNOSIS — Z00.00 HEALTH MAINTENANCE EXAMINATION: ICD-10-CM

## 2025-05-06 DIAGNOSIS — I10 HYPERTENSION, UNSPECIFIED TYPE: Chronic | ICD-10-CM

## 2025-05-06 LAB
ABSOLUTE EOSINOPHIL (OHS): 0.11 K/UL
ABSOLUTE MONOCYTE (OHS): 0.35 K/UL (ref 0.3–1)
ABSOLUTE NEUTROPHIL COUNT (OHS): 2.56 K/UL (ref 1.8–7.7)
ALBUMIN SERPL BCP-MCNC: 3.6 G/DL (ref 3.5–5.2)
ALP SERPL-CCNC: 83 UNIT/L (ref 40–150)
ALT SERPL W/O P-5'-P-CCNC: 12 UNIT/L (ref 10–44)
ANION GAP (OHS): 10 MMOL/L (ref 8–16)
AST SERPL-CCNC: 20 UNIT/L (ref 11–45)
BASOPHILS # BLD AUTO: 0.04 K/UL
BASOPHILS NFR BLD AUTO: 0.9 %
BILIRUB SERPL-MCNC: 0.4 MG/DL (ref 0.1–1)
BUN SERPL-MCNC: 9 MG/DL (ref 6–20)
CALCIUM SERPL-MCNC: 9.7 MG/DL (ref 8.7–10.5)
CHLORIDE SERPL-SCNC: 106 MMOL/L (ref 95–110)
CHOLEST SERPL-MCNC: 212 MG/DL (ref 120–199)
CHOLEST/HDLC SERPL: 5.4 {RATIO} (ref 2–5)
CO2 SERPL-SCNC: 26 MMOL/L (ref 23–29)
CREAT SERPL-MCNC: 1 MG/DL (ref 0.5–1.4)
ERYTHROCYTE [DISTWIDTH] IN BLOOD BY AUTOMATED COUNT: 14.1 % (ref 11.5–14.5)
GFR SERPLBLD CREATININE-BSD FMLA CKD-EPI: >60 ML/MIN/1.73/M2
GLUCOSE SERPL-MCNC: 89 MG/DL (ref 70–110)
HCT VFR BLD AUTO: 45.6 % (ref 37–48.5)
HDLC SERPL-MCNC: 39 MG/DL (ref 40–75)
HDLC SERPL: 18.4 % (ref 20–50)
HGB BLD-MCNC: 13.8 GM/DL (ref 12–16)
IMM GRANULOCYTES # BLD AUTO: 0.01 K/UL (ref 0–0.04)
IMM GRANULOCYTES NFR BLD AUTO: 0.2 % (ref 0–0.5)
LDLC SERPL CALC-MCNC: 136.4 MG/DL (ref 63–159)
LYMPHOCYTES # BLD AUTO: 1.5 K/UL (ref 1–4.8)
MCH RBC QN AUTO: 26.1 PG (ref 27–31)
MCHC RBC AUTO-ENTMCNC: 30.3 G/DL (ref 32–36)
MCV RBC AUTO: 86 FL (ref 82–98)
NONHDLC SERPL-MCNC: 173 MG/DL
NUCLEATED RBC (/100WBC) (OHS): 0 /100 WBC
PLATELET # BLD AUTO: 277 K/UL (ref 150–450)
PMV BLD AUTO: 10.2 FL (ref 9.2–12.9)
POTASSIUM SERPL-SCNC: 4.7 MMOL/L (ref 3.5–5.1)
PROT SERPL-MCNC: 7.5 GM/DL (ref 6–8.4)
RBC # BLD AUTO: 5.29 M/UL (ref 4–5.4)
RELATIVE EOSINOPHIL (OHS): 2.4 %
RELATIVE LYMPHOCYTE (OHS): 32.8 % (ref 18–48)
RELATIVE MONOCYTE (OHS): 7.7 % (ref 4–15)
RELATIVE NEUTROPHIL (OHS): 56 % (ref 38–73)
SODIUM SERPL-SCNC: 142 MMOL/L (ref 136–145)
TRIGL SERPL-MCNC: 183 MG/DL (ref 30–150)
WBC # BLD AUTO: 4.57 K/UL (ref 3.9–12.7)

## 2025-05-06 PROCEDURE — 1160F RVW MEDS BY RX/DR IN RCRD: CPT | Mod: CPTII,S$GLB,, | Performed by: FAMILY MEDICINE

## 2025-05-06 PROCEDURE — 82040 ASSAY OF SERUM ALBUMIN: CPT

## 2025-05-06 PROCEDURE — 3074F SYST BP LT 130 MM HG: CPT | Mod: CPTII,S$GLB,, | Performed by: FAMILY MEDICINE

## 2025-05-06 PROCEDURE — 1159F MED LIST DOCD IN RCRD: CPT | Mod: CPTII,S$GLB,, | Performed by: FAMILY MEDICINE

## 2025-05-06 PROCEDURE — 36415 COLL VENOUS BLD VENIPUNCTURE: CPT | Mod: PN

## 2025-05-06 PROCEDURE — 99999 PR PBB SHADOW E&M-EST. PATIENT-LVL V: CPT | Mod: PBBFAC,,, | Performed by: FAMILY MEDICINE

## 2025-05-06 PROCEDURE — 3079F DIAST BP 80-89 MM HG: CPT | Mod: CPTII,S$GLB,, | Performed by: FAMILY MEDICINE

## 2025-05-06 PROCEDURE — 82465 ASSAY BLD/SERUM CHOLESTEROL: CPT

## 2025-05-06 PROCEDURE — 85025 COMPLETE CBC W/AUTO DIFF WBC: CPT

## 2025-05-06 PROCEDURE — 3008F BODY MASS INDEX DOCD: CPT | Mod: CPTII,S$GLB,, | Performed by: FAMILY MEDICINE

## 2025-05-06 PROCEDURE — 99396 PREV VISIT EST AGE 40-64: CPT | Mod: S$GLB,,, | Performed by: FAMILY MEDICINE

## 2025-05-06 RX ORDER — METOPROLOL SUCCINATE 50 MG/1
50 TABLET, EXTENDED RELEASE ORAL DAILY
Qty: 90 TABLET | Refills: 3 | Status: SHIPPED | OUTPATIENT
Start: 2025-05-06 | End: 2026-05-06

## 2025-05-07 ENCOUNTER — PATIENT MESSAGE (OUTPATIENT)
Dept: FAMILY MEDICINE | Facility: CLINIC | Age: 55
End: 2025-05-07
Payer: COMMERCIAL

## 2025-05-07 DIAGNOSIS — G47.00 INSOMNIA, UNSPECIFIED TYPE: ICD-10-CM

## 2025-05-07 NOTE — TELEPHONE ENCOUNTER
Refill Routing Note   Medication(s) are not appropriate for processing by [facility] for the following reason(s):        Outside of protocol    [facility] action(s):  Route               Appointments  past 12m or future 3m with PCP    Date Provider   Last Visit   5/6/2025 Karson Jackson Jr., MD   Next Visit   Visit date not found Karson Jackson Jr., MD   ED visits in past 90 days: 0        Note composed:7:53 AM 05/07/2025

## 2025-05-08 RX ORDER — HYDROXYZINE HYDROCHLORIDE 25 MG/1
TABLET, FILM COATED ORAL
Qty: 30 TABLET | Refills: 5 | Status: SHIPPED | OUTPATIENT
Start: 2025-05-08

## 2025-05-09 ENCOUNTER — TELEPHONE (OUTPATIENT)
Dept: FAMILY MEDICINE | Facility: CLINIC | Age: 55
End: 2025-05-09
Payer: COMMERCIAL

## 2025-05-09 NOTE — TELEPHONE ENCOUNTER
----- Message from Jer Wilson sent at 5/9/2025  9:34 AM CDT -----  Type:  Test ResultsWho Called:Pt Name of Test (Lab/Mammo/Etc):Date of Test:5/6Where the test was performed:[facility] Would the patient rather a call back or a response via [facility]?Callback Best Call Back Number:Telephone Information:Mobile          [phone] Additional Information:  Requesting pcp comments on results For Clinical Team:Has the provider reviewed the results?

## 2025-05-09 NOTE — TELEPHONE ENCOUNTER
Contact Alondra who's concerned per abnormal lab results, and requesting cholesterol medication refill. Nurse explained that test results are typically sent to the [facility] portal before the doctor receive results, and from what I can see Karson Jackson Jr., MD has not yet seen your lab results. Patient asked do I know when he would review result? Because she is requesting medication do to her lab results. Nurse explained Karson Jackson Jr., MD is in clinic seeing patient and not sure if he has received the results, but as soon as he review the results he will contact her personally or have his staff f/u with instructions if need be. Nurse informed that a message will be sent to PCP. Patient verbalized understanding.

## 2025-05-11 RX ORDER — ATORVASTATIN CALCIUM 40 MG/1
40 TABLET, FILM COATED ORAL DAILY
Qty: 90 TABLET | Refills: 2 | Status: SHIPPED | OUTPATIENT
Start: 2025-05-11 | End: 2026-05-11

## 2025-05-12 NOTE — PROGRESS NOTES
"  Patient Name: Alondra Carrera    : 1970  MRN: [id number]      Subjective:     Patient ID: Alondra is a 54 y.o. female    Chief Complaint:  Follow-up    History of Present Illness    Alondra presents today for annual health maintenance exam and the blood pressure follow up.    She currently takes:  - Amlodipine 5 mg daily  - Baby aspirin  - Metoprolol 50 mg daily (taking daily instead of prescribed twice daily due to low blood pressure)  - Hydroxyzine for allergies  - Omeprazole  - Voltaren gel PRN for foot pain    She reports sores with associated pain on both breasts, denies any discharge. She uses ketoconazole cream for under breast area.    She reports low blood pressure, which has led to taking metoprolol daily instead of the prescribed twice daily dosing.      ROS:  General: -fever, -chills, -fatigue, -weight gain, -weight loss  Eyes: -vision changes, -redness, -discharge  ENT: -ear pain, -nasal congestion, -sore throat  Cardiovascular: -chest pain, -palpitations, -lower extremity edema, +orthostatic symptoms  Respiratory: -cough, -shortness of breath  Gastrointestinal: -abdominal pain, -nausea, -vomiting, -diarrhea, -constipation, -blood in stool  Genitourinary: -dysuria, -hematuria, -frequency  Musculoskeletal: -joint pain, -muscle pain  Skin: -rash, -lesion  Neurological: -headache, -dizziness, -numbness, -tingling, +difficulty staying asleep, +sleep disturbances, +difficulty falling asleep  Psychiatric: -anxiety, -depression, +sleep difficulty  Breasts: +sores, +breast pain         Objective:   /80   Pulse (!) 59   Temp 98.2 °F (36.8 °C) (Oral)   Resp 16   Ht 5' 7" (1.702 m)   Wt 100.7 kg (222 lb 0.1 oz)   SpO2 98%   BMI 34.77 kg/m²     Physical Exam  Vitals reviewed.   Constitutional:       General: She is not in acute distress.  HENT:      Head: Normocephalic and atraumatic.      Right Ear: Ear canal and external ear normal.      Left Ear: Ear canal and external ear normal.      Nose: " Nose normal.      Mouth/Throat:      Mouth: Mucous membranes are moist.      Pharynx: No oropharyngeal exudate or posterior oropharyngeal erythema.   Eyes:      Extraocular Movements: Extraocular movements intact.      Conjunctiva/sclera: Conjunctivae normal.      Pupils: Pupils are equal, round, and reactive to light.   Cardiovascular:      Rate and Rhythm: Normal rate and regular rhythm.      Pulses: Normal pulses.      Heart sounds: Normal heart sounds.   Pulmonary:      Effort: Pulmonary effort is normal. No respiratory distress.      Breath sounds: No wheezing or rales.   Abdominal:      General: Abdomen is flat. Bowel sounds are normal. There is no distension.      Palpations: Abdomen is soft.      Tenderness: There is no abdominal tenderness. There is no guarding.   Musculoskeletal:      Cervical back: Normal range of motion. No rigidity or tenderness.   Lymphadenopathy:      Cervical: No cervical adenopathy.   Skin:     General: Skin is warm.      Capillary Refill: Capillary refill takes less than 2 seconds.   Neurological:      Mental Status: She is alert and oriented to person, place, and time.      Cranial Nerves: No cranial nerve deficit.      Sensory: No sensory deficit.   Psychiatric:         Mood and Affect: Mood normal.         Behavior: Behavior normal.            Assessment        ICD-10-CM ICD-9-CM   1. Health maintenance examination  Z00.00 V70.0   2. Screening for colorectal cancer  Z12.11 V76.51    Z12.12 V76.41   3. Encounter for screening mammogram for malignant neoplasm of breast  Z12.31 V76.12   4. Hypertension, unspecified type  I10 401.9   5. Mixed dyslipidemia  E78.2 272.2   6. Breast pain  N64.4 611.71   7. Vaccination declined  Z28.21 V64.06         Plan:   Assessment & Plan       1. Health maintenance examination  -     Comprehensive Metabolic Panel; Future; Expected date: 05/06/2025  -     CBC Auto Differential; Future; Expected date: 05/06/2025  -     Lipid Panel; Future; Expected  date: 05/06/2025  Age appropriate screenings, vaccinations, and recommendations reviewed and discussed.  Appropriate orders placed and previous results reviewed.    2. Screening for colorectal cancer  -     Fecal Immunochemical Test (iFOBT); Future; Expected date: 05/06/2025  Discussed options for colorectal cancer screening.  We discussed colonoscopy verses stool testing and risks and benefits each.   Patient elected to proceed with [facility] and patient educated on collection method.  Patient informed that if stool testing is positive will need to proceed with colonoscopy.    3. Encounter for screening mammogram for malignant neoplasm of breast  -     Mammo Digital Screening Bilat w/ [facility]manuel (XPD); Future; Expected date: 05/06/2025  Breast cancer screening ordered    4. Hypertension, unspecified type  -     metoprolol succinate (TOPROL-XL) 50 MG 24 hr tablet; Take 1 tablet (50 mg total) by mouth once daily.  Dispense: 90 tablet; Refill: 3  -     Comprehensive Metabolic Panel; Future; Expected date: 05/06/2025  -     CBC Auto Differential; Future; Expected date: 05/06/2025  -     Lipid Panel; Future; Expected date: 05/06/2025  Continue current blood pressure regimen.      5. Mixed dyslipidemia  -     Comprehensive Metabolic Panel; Future; Expected date: 05/06/2025  -     CBC Auto Differential; Future; Expected date: 05/06/2025  -     Lipid Panel; Future; Expected date: 05/06/2025  -     atorvastatin (LIPITOR) 40 MG tablet; Take 1 tablet (40 mg total) by mouth once daily.  Dispense: 90 tablet; Refill: 2  Check lipids.      6. Breast pain  -     Ambulatory referral/consult to Breast Surgery; Future; Expected date: 05/13/2025  Referral to breast surgery placed.    7. Vaccination declined  Patient declined recommended vaccinations.           -Karson Jackson Jr., MD, AAHIVS      This note was generated with the assistance of ambient listening technology. Verbal consent was obtained by the patient and accompanying visitor(s)  for the recording of patient appointment to facilitate this note. I attest to having reviewed and edited the generated note for accuracy, though some syntax or spelling errors may persist. Please contact the author of this note for any clarification.      Patient Instructions   Please call [phone] to schedule mammogram      Follow up in about 1 year (around 5/6/2026) for Annual.   Future Appointments   Date Time Provider Department Center   5/6/2026  9:40 AM Karson Jackson Jr., MD [facility]

## 2025-05-18 ENCOUNTER — RESULTS FOLLOW-UP (OUTPATIENT)
Dept: FAMILY MEDICINE | Facility: CLINIC | Age: 55
End: 2025-05-18
Payer: COMMERCIAL

## 2025-05-20 ENCOUNTER — LAB VISIT (OUTPATIENT)
Dept: LAB | Facility: HOSPITAL | Age: 55
End: 2025-05-20
Attending: FAMILY MEDICINE
Payer: COMMERCIAL

## 2025-05-20 DIAGNOSIS — Z12.11 SCREENING FOR COLORECTAL CANCER: ICD-10-CM

## 2025-05-20 DIAGNOSIS — Z12.12 SCREENING FOR COLORECTAL CANCER: ICD-10-CM

## 2025-05-20 PROCEDURE — 82274 ASSAY TEST FOR BLOOD FECAL: CPT

## 2025-05-23 LAB — OB PNL STL IA: NEGATIVE

## 2025-05-28 ENCOUNTER — HOSPITAL ENCOUNTER (OUTPATIENT)
Dept: RADIOLOGY | Facility: HOSPITAL | Age: 55
Discharge: HOME | End: 2025-05-28
Attending: PHYSICIAN ASSISTANT
Payer: COMMERCIAL

## 2025-05-28 ENCOUNTER — OFFICE VISIT (OUTPATIENT)
Dept: SURGERY | Facility: CLINIC | Age: 55
End: 2025-05-28
Payer: COMMERCIAL

## 2025-05-28 DIAGNOSIS — N64.4 BREAST PAIN: Primary | ICD-10-CM

## 2025-05-28 DIAGNOSIS — Z12.39 SCREENING BREAST EXAMINATION: ICD-10-CM

## 2025-05-28 DIAGNOSIS — N64.4 BREAST PAIN: ICD-10-CM

## 2025-05-28 PROCEDURE — 77062 BREAST TOMOSYNTHESIS BI: CPT | Mod: 26,,, | Performed by: RADIOLOGY

## 2025-05-28 PROCEDURE — 99203 OFFICE O/P NEW LOW 30 MIN: CPT | Mod: S$PBB,,, | Performed by: PHYSICIAN ASSISTANT

## 2025-05-28 PROCEDURE — 99999 PR PBB SHADOW E&M-EST. PATIENT-LVL I: CPT | Mod: PBBFAC,,, | Performed by: PHYSICIAN ASSISTANT

## 2025-05-28 PROCEDURE — 77066 DX MAMMO INCL CAD BI: CPT | Mod: 26,,, | Performed by: RADIOLOGY

## 2025-05-28 PROCEDURE — 77062 BREAST TOMOSYNTHESIS BI: CPT | Mod: TC

## (undated) DEVICE — KIT SAHARA DRAPE DRAW/LIFT

## (undated) DEVICE — SUT VICRYL BR 1 GEN 27 CT-1

## (undated) DEVICE — Device

## (undated) DEVICE — SEE MEDLINE ITEM 152487

## (undated) DEVICE — DRESSING SPONGE 8PLY 4X4 STRL

## (undated) DEVICE — SUT 6 18IN STEEL MONO CCS

## (undated) DEVICE — DRAPE SPLIT STERILE

## (undated) DEVICE — SEE ITEM #153244

## (undated) DEVICE — WIRE INTRAMYOCARDIAL TEMP

## (undated) DEVICE — DRAIN CHANNEL ROUND 19FR

## (undated) DEVICE — SUT PROLENE 4-0 RB-1 BL MO

## (undated) DEVICE — COVER SET UP STRL 54X54 20/BX

## (undated) DEVICE — BLADE STERN 65.8MM

## (undated) DEVICE — INSERTS STEALTH FIBRA SIZE 5

## (undated) DEVICE — HEMOSTAT SURGICEL NU-KNIT 6X9

## (undated) DEVICE — SUT ETHIBOND XTRA 2-0 SH-2

## (undated) DEVICE — SUT SILK 2-0 SH 18IN BLACK

## (undated) DEVICE — CLOSURE SKIN STERI STRIP 1/2X4

## (undated) DEVICE — DRAIN CHEST DRY SUCTION

## (undated) DEVICE — SET DECANTER MEDICHOICE

## (undated) DEVICE — SUT 2/0 30IN ETHIBOND

## (undated) DEVICE — SUT PROLENE 4-0 SH BLU 36IN

## (undated) DEVICE — DRESSING TELFA N ADH 3X8

## (undated) DEVICE — FOGERTY SOFT JAW DISP 2/PK

## (undated) DEVICE — BLADE SAW STERNAL 5/BX

## (undated) DEVICE — CANNULA AORTIC ROOT 12 GAUGE 9

## (undated) DEVICE — GAUZE SPONGE 4X4 12PLY

## (undated) DEVICE — ELECTRODE REM PLYHSV RETURN 9

## (undated) DEVICE — BAND ATS FLX- C 29X31X37X73X65
Type: IMPLANTABLE DEVICE | Site: HEART | Status: NON-FUNCTIONAL
Removed: 2018-01-18

## (undated) DEVICE — SUT SILK BLK BR. 2 2-60

## (undated) DEVICE — TAPE SURG MEDIPORE 6X72IN

## (undated) DEVICE — DRESSING ADH ISLAND 3.6 X 14

## (undated) DEVICE — DRAPE SLUSH WARMER WITH DISC

## (undated) DEVICE — TRAY HEART

## (undated) DEVICE — SUT 2-0 VICRYL / CT-1

## (undated) DEVICE — BRA SURGICAL XL 40-42

## (undated) DEVICE — CONTAINER SPECIMEN STRL 4OZ

## (undated) DEVICE — LOOP VESSEL BLUE MAXI